# Patient Record
Sex: MALE | Race: WHITE | Employment: OTHER | URBAN - METROPOLITAN AREA
[De-identification: names, ages, dates, MRNs, and addresses within clinical notes are randomized per-mention and may not be internally consistent; named-entity substitution may affect disease eponyms.]

---

## 2017-02-20 ENCOUNTER — TELEPHONE (OUTPATIENT)
Dept: INTERNAL MEDICINE CLINIC | Age: 82
End: 2017-02-20

## 2017-02-23 ENCOUNTER — OFFICE VISIT (OUTPATIENT)
Dept: INTERNAL MEDICINE CLINIC | Age: 82
End: 2017-02-23

## 2017-02-23 VITALS
BODY MASS INDEX: 30.24 KG/M2 | DIASTOLIC BLOOD PRESSURE: 78 MMHG | HEART RATE: 60 BPM | WEIGHT: 216 LBS | SYSTOLIC BLOOD PRESSURE: 136 MMHG | HEIGHT: 71 IN

## 2017-02-23 DIAGNOSIS — R41.3 MEMORY LOSS: Primary | ICD-10-CM

## 2017-02-23 DIAGNOSIS — R73.9 HYPERGLYCEMIA: ICD-10-CM

## 2017-02-23 DIAGNOSIS — D64.9 NORMOCYTIC ANEMIA: ICD-10-CM

## 2017-02-23 LAB
ANION GAP SERPL CALCULATED.3IONS-SCNC: 13 MMOL/L (ref 3–16)
BUN BLDV-MCNC: 14 MG/DL (ref 7–20)
CALCIUM SERPL-MCNC: 9.1 MG/DL (ref 8.3–10.6)
CHLORIDE BLD-SCNC: 102 MMOL/L (ref 99–110)
CO2: 25 MMOL/L (ref 21–32)
CREAT SERPL-MCNC: 0.9 MG/DL (ref 0.8–1.3)
FOLATE: >20 NG/ML (ref 4.78–24.2)
GFR AFRICAN AMERICAN: >60
GFR NON-AFRICAN AMERICAN: >60
GLUCOSE BLD-MCNC: 104 MG/DL (ref 70–99)
POTASSIUM SERPL-SCNC: 4.9 MMOL/L (ref 3.5–5.1)
SODIUM BLD-SCNC: 140 MMOL/L (ref 136–145)
T4 FREE: 1.1 NG/DL (ref 0.9–1.8)
TSH SERPL DL<=0.05 MIU/L-ACNC: 1.85 UIU/ML (ref 0.27–4.2)
VITAMIN B-12: 623 PG/ML (ref 211–911)

## 2017-02-23 PROCEDURE — 99214 OFFICE O/P EST MOD 30 MIN: CPT | Performed by: INTERNAL MEDICINE

## 2017-02-23 RX ORDER — DONEPEZIL HYDROCHLORIDE 5 MG/1
5 TABLET, FILM COATED ORAL NIGHTLY
Qty: 30 TABLET | Refills: 3 | Status: SHIPPED | OUTPATIENT
Start: 2017-02-23 | End: 2017-05-25

## 2017-02-24 LAB
ESTIMATED AVERAGE GLUCOSE: 122.6 MG/DL
HBA1C MFR BLD: 5.9 %

## 2017-05-25 ENCOUNTER — OFFICE VISIT (OUTPATIENT)
Dept: INTERNAL MEDICINE CLINIC | Age: 82
End: 2017-05-25

## 2017-05-25 VITALS
BODY MASS INDEX: 30.24 KG/M2 | HEIGHT: 71 IN | DIASTOLIC BLOOD PRESSURE: 74 MMHG | WEIGHT: 216 LBS | HEART RATE: 60 BPM | SYSTOLIC BLOOD PRESSURE: 124 MMHG

## 2017-05-25 DIAGNOSIS — I10 ESSENTIAL HYPERTENSION: Primary | Chronic | ICD-10-CM

## 2017-05-25 DIAGNOSIS — R41.3 MEMORY LOSS: ICD-10-CM

## 2017-05-25 PROCEDURE — 99213 OFFICE O/P EST LOW 20 MIN: CPT | Performed by: INTERNAL MEDICINE

## 2017-05-31 RX ORDER — PRAVASTATIN SODIUM 20 MG
TABLET ORAL
Qty: 90 TABLET | Refills: 3 | Status: SHIPPED | OUTPATIENT
Start: 2017-05-31 | End: 2018-06-20 | Stop reason: SDUPTHER

## 2017-10-04 DIAGNOSIS — Z23 NEED FOR INFLUENZA VACCINATION: Primary | ICD-10-CM

## 2017-10-04 PROCEDURE — G0008 ADMIN INFLUENZA VIRUS VAC: HCPCS | Performed by: INTERNAL MEDICINE

## 2017-10-04 PROCEDURE — 90662 IIV NO PRSV INCREASED AG IM: CPT | Performed by: INTERNAL MEDICINE

## 2017-11-27 ENCOUNTER — OFFICE VISIT (OUTPATIENT)
Dept: INTERNAL MEDICINE CLINIC | Age: 82
End: 2017-11-27

## 2017-11-27 VITALS
DIASTOLIC BLOOD PRESSURE: 72 MMHG | SYSTOLIC BLOOD PRESSURE: 122 MMHG | BODY MASS INDEX: 29.54 KG/M2 | HEART RATE: 64 BPM | WEIGHT: 211 LBS | HEIGHT: 71 IN

## 2017-11-27 DIAGNOSIS — N40.0 BENIGN PROSTATIC HYPERPLASIA WITHOUT URINARY OBSTRUCTION: ICD-10-CM

## 2017-11-27 DIAGNOSIS — I10 ESSENTIAL HYPERTENSION: Primary | Chronic | ICD-10-CM

## 2017-11-27 DIAGNOSIS — E78.2 MIXED HYPERLIPIDEMIA: ICD-10-CM

## 2017-11-27 LAB
A/G RATIO: 1.6 (ref 1.1–2.2)
ALBUMIN SERPL-MCNC: 4.2 G/DL (ref 3.4–5)
ALP BLD-CCNC: 64 U/L (ref 40–129)
ALT SERPL-CCNC: 18 U/L (ref 10–40)
ANION GAP SERPL CALCULATED.3IONS-SCNC: 16 MMOL/L (ref 3–16)
AST SERPL-CCNC: 14 U/L (ref 15–37)
BILIRUB SERPL-MCNC: 0.6 MG/DL (ref 0–1)
BUN BLDV-MCNC: 17 MG/DL (ref 7–20)
CALCIUM SERPL-MCNC: 9.1 MG/DL (ref 8.3–10.6)
CHLORIDE BLD-SCNC: 105 MMOL/L (ref 99–110)
CHOLESTEROL, TOTAL: 173 MG/DL (ref 0–199)
CO2: 26 MMOL/L (ref 21–32)
CREAT SERPL-MCNC: 0.9 MG/DL (ref 0.8–1.3)
GFR AFRICAN AMERICAN: >60
GFR NON-AFRICAN AMERICAN: >60
GLOBULIN: 2.6 G/DL
GLUCOSE BLD-MCNC: 97 MG/DL (ref 70–99)
HDLC SERPL-MCNC: 63 MG/DL (ref 40–60)
LDL CHOLESTEROL CALCULATED: 97 MG/DL
POTASSIUM SERPL-SCNC: 4.7 MMOL/L (ref 3.5–5.1)
SODIUM BLD-SCNC: 147 MMOL/L (ref 136–145)
T4 FREE: 1.1 NG/DL (ref 0.9–1.8)
TOTAL PROTEIN: 6.8 G/DL (ref 6.4–8.2)
TRIGL SERPL-MCNC: 63 MG/DL (ref 0–150)
TSH SERPL DL<=0.05 MIU/L-ACNC: 1.96 UIU/ML (ref 0.27–4.2)
VLDLC SERPL CALC-MCNC: 13 MG/DL

## 2017-11-27 PROCEDURE — 99214 OFFICE O/P EST MOD 30 MIN: CPT | Performed by: INTERNAL MEDICINE

## 2017-11-27 ASSESSMENT — PATIENT HEALTH QUESTIONNAIRE - PHQ9
1. LITTLE INTEREST OR PLEASURE IN DOING THINGS: 0
SUM OF ALL RESPONSES TO PHQ9 QUESTIONS 1 & 2: 0
2. FEELING DOWN, DEPRESSED OR HOPELESS: 0
SUM OF ALL RESPONSES TO PHQ QUESTIONS 1-9: 0

## 2017-11-27 ASSESSMENT — ENCOUNTER SYMPTOMS
CONSTIPATION: 0
SHORTNESS OF BREATH: 0

## 2017-11-27 NOTE — PROGRESS NOTES
SUBJECTIVE:  Patient ID: Carlos Bowles is a 81 y.o. y.o. male     HPI    Carlos Bowles returns for follow up of hypertension. Patient has been taking his medications as prescribed. Patient's blood pressure is  controlled. Side effects related to taking the medications include no medication side effects noted    Patient returns for follow up of hyperlipidemia. Patient has been taking His medications as prescribed. Patient's lipids are controlled. Side effects related to taking the medications include none. He has not had clinical consequences related to hyperlipidemia including, but not limited to acute coronary syndrome, stroke or chronic kidney disease. Carlos Bowles returns to the office for follow up of osteoarthritis. Pain is located in the bilateral hip(s). He has been taking medication for treatment of symptoms for year(s). Medications for treatment of pain include ibuprofen PRN. Patient reports good relief of symptoms. Pain is not associated with other symptoms. His wife was recently admitted to the hospital for syncope. He is her primary care giver. Review of Systems   Respiratory: Negative for shortness of breath. Cardiovascular: Negative for chest pain. Gastrointestinal: Negative for constipation. Genitourinary: Negative for difficulty urinating. OBJECTIVE:    /72   Pulse 64   Ht 5' 11\" (1.803 m)   Wt 211 lb (95.7 kg)   BMI 29.43 kg/m²    Physical Exam   Constitutional: He is oriented to person, place, and time. He appears well-developed and well-nourished. No distress. HENT:   Head: Normocephalic and atraumatic. Pulmonary/Chest: Effort normal. No respiratory distress. Neurological: He is alert and oriented to person, place, and time. No cranial nerve deficit. Skin: Skin is warm and dry. He is not diaphoretic. Psychiatric: He has a normal mood and affect. His behavior is normal. Judgment and thought content normal.   Vitals reviewed. Current Outpatient Prescriptions:     pravastatin (PRAVACHOL) 20 MG tablet, TAKE ONE TABLET BY MOUTH EVERY NIGHT AT BEDTIME FOR CHOLESTEROL, Disp: 90 tablet, Rfl: 3    lisinopril (PRINIVIL;ZESTRIL) 10 MG tablet, TAKE ONE TABLET BY MOUTH DAILY, Disp: 30 tablet, Rfl: 11    bimatoprost (LUMIGAN) 0.01 % SOLN ophthalmic drops, Place 1 drop into both eyes nightly., Disp: , Rfl:     aspirin 81 MG EC tablet, Take 81 mg by mouth every evening.  , Disp: , Rfl:     multivitamin (ANTIOXIDANT;PROSIGHT) TABS per tablet, Take 1 tablet by mouth every evening.  , Disp: , Rfl:     therapeutic multivitamin-minerals (THERAGRAN-M) tablet, Take 1 tablet by mouth every evening.  , Disp: , Rfl:     Assessment/Plan:  Morgan was seen today for hypertension and hyperlipidemia. Diagnoses and all orders for this visit:    Essential hypertension  -     Comprehensive Metabolic Panel  -     TSH without Reflex  -     T4, Free    Benign prostatic hyperplasia without urinary obstruction  Comments:  Chronic, controlled.     Mixed hyperlipidemia  -     Lipid Panel        Hung Harris MD

## 2018-04-02 ENCOUNTER — OFFICE VISIT (OUTPATIENT)
Dept: INTERNAL MEDICINE CLINIC | Age: 83
End: 2018-04-02

## 2018-04-02 VITALS
HEART RATE: 60 BPM | HEIGHT: 71 IN | BODY MASS INDEX: 27.72 KG/M2 | DIASTOLIC BLOOD PRESSURE: 70 MMHG | SYSTOLIC BLOOD PRESSURE: 122 MMHG | WEIGHT: 198 LBS

## 2018-04-02 DIAGNOSIS — F32.9 REACTIVE DEPRESSION: ICD-10-CM

## 2018-04-02 DIAGNOSIS — E78.2 MIXED HYPERLIPIDEMIA: ICD-10-CM

## 2018-04-02 DIAGNOSIS — I10 ESSENTIAL HYPERTENSION: Primary | Chronic | ICD-10-CM

## 2018-04-02 DIAGNOSIS — N40.0 BENIGN PROSTATIC HYPERPLASIA WITHOUT URINARY OBSTRUCTION: ICD-10-CM

## 2018-04-02 PROCEDURE — 99213 OFFICE O/P EST LOW 20 MIN: CPT | Performed by: INTERNAL MEDICINE

## 2018-07-19 ENCOUNTER — OFFICE VISIT (OUTPATIENT)
Dept: INTERNAL MEDICINE CLINIC | Age: 83
End: 2018-07-19

## 2018-07-19 VITALS
DIASTOLIC BLOOD PRESSURE: 80 MMHG | BODY MASS INDEX: 26.97 KG/M2 | HEART RATE: 60 BPM | SYSTOLIC BLOOD PRESSURE: 130 MMHG | WEIGHT: 193.4 LBS

## 2018-07-19 DIAGNOSIS — F41.1 GENERALIZED ANXIETY DISORDER: Primary | ICD-10-CM

## 2018-07-19 PROCEDURE — 99213 OFFICE O/P EST LOW 20 MIN: CPT | Performed by: INTERNAL MEDICINE

## 2018-07-23 ASSESSMENT — ENCOUNTER SYMPTOMS
SPUTUM PRODUCTION: 0
SHORTNESS OF BREATH: 0
COUGH: 0
HEMOPTYSIS: 0

## 2018-07-25 ENCOUNTER — TELEPHONE (OUTPATIENT)
Dept: RHEUMATOLOGY | Age: 83
End: 2018-07-25

## 2018-07-25 NOTE — TELEPHONE ENCOUNTER
Received a Denial for this medication. States patient has to have a trial and failure to oral NSAIDS. Denial attached. Please advise patient.

## 2018-08-22 ENCOUNTER — TELEPHONE (OUTPATIENT)
Dept: INTERNAL MEDICINE CLINIC | Age: 83
End: 2018-08-22

## 2018-08-22 NOTE — TELEPHONE ENCOUNTER
Pt would like information regarding pancreatitis. He is concerned about his son whom has recently been diagnosed.

## 2018-10-10 DIAGNOSIS — Z23 NEED FOR INFLUENZA VACCINATION: Primary | ICD-10-CM

## 2018-10-10 PROCEDURE — 90662 IIV NO PRSV INCREASED AG IM: CPT | Performed by: INTERNAL MEDICINE

## 2018-10-10 PROCEDURE — G0008 ADMIN INFLUENZA VIRUS VAC: HCPCS | Performed by: INTERNAL MEDICINE

## 2018-11-02 ENCOUNTER — OFFICE VISIT (OUTPATIENT)
Dept: INTERNAL MEDICINE CLINIC | Age: 83
End: 2018-11-02
Payer: COMMERCIAL

## 2018-11-02 VITALS
RESPIRATION RATE: 12 BRPM | SYSTOLIC BLOOD PRESSURE: 136 MMHG | HEART RATE: 68 BPM | TEMPERATURE: 98.1 F | HEIGHT: 71 IN | DIASTOLIC BLOOD PRESSURE: 78 MMHG | WEIGHT: 192.2 LBS | BODY MASS INDEX: 26.91 KG/M2

## 2018-11-02 DIAGNOSIS — J06.9 VIRAL UPPER RESPIRATORY TRACT INFECTION: ICD-10-CM

## 2018-11-02 DIAGNOSIS — I10 ESSENTIAL HYPERTENSION: Primary | Chronic | ICD-10-CM

## 2018-11-02 DIAGNOSIS — E78.2 MIXED HYPERLIPIDEMIA: ICD-10-CM

## 2018-11-02 DIAGNOSIS — R73.03 PRE-DIABETES: ICD-10-CM

## 2018-11-02 DIAGNOSIS — I10 ESSENTIAL HYPERTENSION: Chronic | ICD-10-CM

## 2018-11-02 DIAGNOSIS — N40.0 BENIGN PROSTATIC HYPERPLASIA WITHOUT URINARY OBSTRUCTION: ICD-10-CM

## 2018-11-02 DIAGNOSIS — R41.3 MEMORY IMPAIRMENT: ICD-10-CM

## 2018-11-02 DIAGNOSIS — M17.11 PRIMARY OSTEOARTHRITIS OF RIGHT KNEE: ICD-10-CM

## 2018-11-02 LAB
A/G RATIO: 1.3 (ref 1.1–2.2)
ALBUMIN SERPL-MCNC: 4 G/DL (ref 3.4–5)
ALP BLD-CCNC: 76 U/L (ref 40–129)
ALT SERPL-CCNC: 16 U/L (ref 10–40)
ANION GAP SERPL CALCULATED.3IONS-SCNC: 10 MMOL/L (ref 3–16)
AST SERPL-CCNC: 12 U/L (ref 15–37)
BASOPHILS ABSOLUTE: 0 K/UL (ref 0–0.2)
BASOPHILS RELATIVE PERCENT: 0.3 %
BILIRUB SERPL-MCNC: 0.4 MG/DL (ref 0–1)
BUN BLDV-MCNC: 15 MG/DL (ref 7–20)
CALCIUM SERPL-MCNC: 9.1 MG/DL (ref 8.3–10.6)
CHLORIDE BLD-SCNC: 102 MMOL/L (ref 99–110)
CHOLESTEROL, TOTAL: 164 MG/DL (ref 0–199)
CO2: 28 MMOL/L (ref 21–32)
CREAT SERPL-MCNC: 0.9 MG/DL (ref 0.8–1.3)
EOSINOPHILS ABSOLUTE: 0.1 K/UL (ref 0–0.6)
EOSINOPHILS RELATIVE PERCENT: 1.5 %
GFR AFRICAN AMERICAN: >60
GFR NON-AFRICAN AMERICAN: >60
GLOBULIN: 3.1 G/DL
GLUCOSE BLD-MCNC: 113 MG/DL (ref 70–99)
HCT VFR BLD CALC: 37.6 % (ref 40.5–52.5)
HDLC SERPL-MCNC: 57 MG/DL (ref 40–60)
HEMOGLOBIN: 12.7 G/DL (ref 13.5–17.5)
LDL CHOLESTEROL CALCULATED: 97 MG/DL
LYMPHOCYTES ABSOLUTE: 1.4 K/UL (ref 1–5.1)
LYMPHOCYTES RELATIVE PERCENT: 18.2 %
MCH RBC QN AUTO: 31.2 PG (ref 26–34)
MCHC RBC AUTO-ENTMCNC: 33.8 G/DL (ref 31–36)
MCV RBC AUTO: 92.4 FL (ref 80–100)
MONOCYTES ABSOLUTE: 0.8 K/UL (ref 0–1.3)
MONOCYTES RELATIVE PERCENT: 10 %
NEUTROPHILS ABSOLUTE: 5.5 K/UL (ref 1.7–7.7)
NEUTROPHILS RELATIVE PERCENT: 70 %
PDW BLD-RTO: 12.4 % (ref 12.4–15.4)
PLATELET # BLD: 201 K/UL (ref 135–450)
PMV BLD AUTO: 8.5 FL (ref 5–10.5)
POTASSIUM SERPL-SCNC: 4.4 MMOL/L (ref 3.5–5.1)
RBC # BLD: 4.07 M/UL (ref 4.2–5.9)
SODIUM BLD-SCNC: 140 MMOL/L (ref 136–145)
TOTAL PROTEIN: 7.1 G/DL (ref 6.4–8.2)
TRIGL SERPL-MCNC: 50 MG/DL (ref 0–150)
VLDLC SERPL CALC-MCNC: 10 MG/DL
WBC # BLD: 7.8 K/UL (ref 4–11)

## 2018-11-02 PROCEDURE — 99214 OFFICE O/P EST MOD 30 MIN: CPT | Performed by: INTERNAL MEDICINE

## 2018-11-02 ASSESSMENT — ENCOUNTER SYMPTOMS
RHINORRHEA: 1
CHEST TIGHTNESS: 0
DIARRHEA: 0
TROUBLE SWALLOWING: 0
SINUS PRESSURE: 0
VOICE CHANGE: 0
CONSTIPATION: 0
SHORTNESS OF BREATH: 0

## 2018-11-02 NOTE — ASSESSMENT & PLAN NOTE
Bothers him more from time to time. Has intermittent swelling. Does wear a knee brace when it is bothering him. Has had a prescription for Voltaren gel in the past but is not using currently.

## 2018-11-02 NOTE — PROGRESS NOTES
use: No    Sexual activity: Not on file     Other Topics Concern    Not on file     Social History Narrative    No narrative on file     Family History   Problem Relation Age of Onset    Heart Disease Mother     Sudden Death Father 68        drowning        Outpatient Medications Prior to Visit   Medication Sig Dispense Refill    pravastatin (PRAVACHOL) 20 MG tablet TAKE ONE TABLET BY MOUTH EVERY NIGHT AT BEDTIME FOR CHOLESTEROL 90 tablet 3    lisinopril (PRINIVIL;ZESTRIL) 10 MG tablet TAKE ONE TABLET BY MOUTH DAILY 90 tablet 3    diclofenac sodium (VOLTAREN) 1 % GEL Apply 4 g topically 4 times daily 5 Tube 3    bimatoprost (LUMIGAN) 0.01 % SOLN ophthalmic drops Place 1 drop into both eyes nightly.  aspirin 81 MG EC tablet Take 81 mg by mouth every evening.  multivitamin (ANTIOXIDANT;PROSIGHT) TABS per tablet Take 1 tablet by mouth every evening. No facility-administered medications prior to visit. Patient'spast medical history, surgical history, family history, medications,  and allergies  were all reviewed and updated as appropriate today. Review of Systems   Constitutional: Negative for appetite change, fatigue and fever. HENT: Positive for congestion, postnasal drip, rhinorrhea and sneezing. Negative for ear pain, sinus pressure, trouble swallowing and voice change. Respiratory: Negative for chest tightness and shortness of breath. Cardiovascular: Negative for chest pain. Gastrointestinal: Negative for constipation and diarrhea. Musculoskeletal: Positive for arthralgias. Skin: Negative for rash. /78 (Site: Left Upper Arm)   Pulse 68   Temp 98.1 °F (36.7 °C) (Oral)   Resp 12   Ht 5' 11\" (1.803 m)   Wt 192 lb 3.2 oz (87.2 kg)   BMI 26.81 kg/m²   Physical Exam   Constitutional: He is oriented to person, place, and time. He appears well-developed and well-nourished. He is cooperative. He does not appear ill. No distress.    HENT:   Head:

## 2018-11-03 LAB
ESTIMATED AVERAGE GLUCOSE: 125.5 MG/DL
HBA1C MFR BLD: 6 %

## 2019-01-25 RX ORDER — LISINOPRIL 10 MG/1
TABLET ORAL
Qty: 90 TABLET | Refills: 2 | Status: SHIPPED | OUTPATIENT
Start: 2019-01-25 | End: 2019-11-02 | Stop reason: SDUPTHER

## 2019-06-03 ENCOUNTER — OFFICE VISIT (OUTPATIENT)
Dept: INTERNAL MEDICINE CLINIC | Age: 84
End: 2019-06-03
Payer: MEDICARE

## 2019-06-03 VITALS
BODY MASS INDEX: 27.2 KG/M2 | HEART RATE: 60 BPM | SYSTOLIC BLOOD PRESSURE: 128 MMHG | DIASTOLIC BLOOD PRESSURE: 74 MMHG | WEIGHT: 195 LBS | OXYGEN SATURATION: 98 %

## 2019-06-03 DIAGNOSIS — E78.2 MIXED HYPERLIPIDEMIA: ICD-10-CM

## 2019-06-03 DIAGNOSIS — I10 ESSENTIAL HYPERTENSION: Primary | Chronic | ICD-10-CM

## 2019-06-03 DIAGNOSIS — R73.03 PRE-DIABETES: ICD-10-CM

## 2019-06-03 PROBLEM — J06.9 VIRAL UPPER RESPIRATORY TRACT INFECTION: Status: RESOLVED | Noted: 2018-11-02 | Resolved: 2019-06-03

## 2019-06-03 LAB — HBA1C MFR BLD: 5.2 %

## 2019-06-03 PROCEDURE — G8427 DOCREV CUR MEDS BY ELIG CLIN: HCPCS | Performed by: INTERNAL MEDICINE

## 2019-06-03 PROCEDURE — 4040F PNEUMOC VAC/ADMIN/RCVD: CPT | Performed by: INTERNAL MEDICINE

## 2019-06-03 PROCEDURE — 1123F ACP DISCUSS/DSCN MKR DOCD: CPT | Performed by: INTERNAL MEDICINE

## 2019-06-03 PROCEDURE — 1036F TOBACCO NON-USER: CPT | Performed by: INTERNAL MEDICINE

## 2019-06-03 PROCEDURE — 99214 OFFICE O/P EST MOD 30 MIN: CPT | Performed by: INTERNAL MEDICINE

## 2019-06-03 PROCEDURE — 83036 HEMOGLOBIN GLYCOSYLATED A1C: CPT | Performed by: INTERNAL MEDICINE

## 2019-06-03 PROCEDURE — G8419 CALC BMI OUT NRM PARAM NOF/U: HCPCS | Performed by: INTERNAL MEDICINE

## 2019-06-03 ASSESSMENT — ENCOUNTER SYMPTOMS
SHORTNESS OF BREATH: 0
DIARRHEA: 0
CONSTIPATION: 0
CHEST TIGHTNESS: 0

## 2019-06-03 ASSESSMENT — PATIENT HEALTH QUESTIONNAIRE - PHQ9
1. LITTLE INTEREST OR PLEASURE IN DOING THINGS: 0
2. FEELING DOWN, DEPRESSED OR HOPELESS: 0
SUM OF ALL RESPONSES TO PHQ QUESTIONS 1-9: 0
SUM OF ALL RESPONSES TO PHQ QUESTIONS 1-9: 0
SUM OF ALL RESPONSES TO PHQ9 QUESTIONS 1 & 2: 0

## 2019-06-03 NOTE — PATIENT INSTRUCTIONS
Look for low sodium frozen meals- look at [facility]. You may also try Glucerna or Boost Plus or Ensure Plus to add protein to your diet.

## 2019-06-03 NOTE — PROGRESS NOTES
Patient: Elder Dunn is a 81 y.o. male who presents today with the following Chief Complaint(s):  Chief Complaint   Patient presents with    Check-Up     HTN and feet problems        HPI     Here today for follow up. Is doing ok. Is c/o some tingling in his feet. Has been going on for several years now. Does not bother him. HTN- no complaints. Does not monitor her blood pressure. Remains on on lisinopril 10 mg qd. Pre-DM/DM- last hba1c was 6.0%. Does not eat a good diet. Struggles with meals as he lives alone. Eats mostly cereal. Has a good meal twice a week when he volunteers at [facility]. Is still living alone but has a good relationship with his son. No side effects with Pravachol. No recent labs. Is still mowing his lawn with a riding mower. Plays golf with his son (only 9 holes) weekly. Likes to walk but son likes to take the golf cart. Enjoys singing in the choir at his [employer]. POCT HbA1c 5.2%.      Lab Results   Component Value Date     11/02/2018    K 4.4 11/02/2018     11/02/2018    CO2 28 11/02/2018    BUN 15 11/02/2018    CREATININE 0.9 11/02/2018    GLUCOSE 113 (H) 11/02/2018    CALCIUM 9.1 11/02/2018    PROT 7.1 11/02/2018    LABALBU 4.0 11/02/2018    BILITOT 0.4 11/02/2018    ALKPHOS 76 11/02/2018    AST 12 (L) 11/02/2018    ALT 16 11/02/2018    LABGLOM >60 11/02/2018    GFRAA >60 11/02/2018    AGRATIO 1.3 11/02/2018    GLOB 3.1 11/02/2018       Lab Results   Component Value Date    CHOL 164 11/02/2018    CHOL 173 11/27/2017    CHOL 183 09/28/2016     Lab Results   Component Value Date    TRIG 50 11/02/2018    TRIG 63 11/27/2017    TRIG 61 09/28/2016     Lab Results   Component Value Date    HDL 57 11/02/2018    HDL 63 (H) 11/27/2017    HDL 63 (H) 09/28/2016     Lab Results   Component Value Date    LDLCALC 97 11/02/2018    LDLCALC 97 11/27/2017    LDLCALC 108 (H) 09/28/2016     Lab Results   Component Value Date    LABVLDL 10 11/02/2018    LABVLDL 13 11/27/2017 LABVLDL 12 2016     No results found for: [facility]  Lab Results   Component Value Date    LABA1C 6.0 2018     Lab Results   Component Value Date    .5 2018             No Known Allergies   Past Medical History:   Diagnosis Date    Benign prostatic hypertrophy without urinary obstruction     Bradycardia 1/15/2013    COPD [facility]chronic obstructive pulmonary disease) ([facility]     Glaucoma     Gout     Hyperlipidemia     Hypertension     Hypoxemia     Macular degeneration disease     Osteoarthritis     Sleep apnea     Unspecified sleep apnea       Past Surgical History:   Procedure Laterality Date    COLONOSCOPY      JOINT REPLACEMENT      hip blateral    KNEE SURGERY      TOTAL HIP ARTHROPLASTY  2005    TURP        Social History     Socioeconomic History    Marital status:       Spouse name: Not on file    Number of children: Not on file    Years of education: Not on file    Highest education level: Not on file   Occupational History    Occupation: retired   Social Needs    Financial resource strain: Not on file    Food insecurity:     Worry: Not on file     Inability: Not on file   [employer] needs:     Medical: Not on file     Non-medical: Not on file   Tobacco Use    Smoking status: Former Smoker     Packs/day: 0.50     Last attempt to quit: 1978     Years since quittin.1    Smokeless tobacco: Never Used   Substance and Sexual Activity    Alcohol use: No    Drug use: No    Sexual activity: Not on file   Lifestyle    Physical activity:     Days per week: Not on file     Minutes per session: Not on file    Stress: Not on file   Relationships    Social connections:     Talks on phone: Not on file     Gets together: Not on file     Attends [employer] orth[employer] service: Not on file     Active member of club or organization: Not on file     Attends meetings of clubs or organizations: Not on file     Relationship status: Not on file    Intimate partner violence:     Fear of current or ex partner: Not on file     Emotionally abused: Not on file     Physically abused: Not on file     Forced sexual activity: Not on file   Other Topics Concern    Not on file   Social History Narrative    Not on file     Family History   Problem Relation Age of Onset    Heart Disease Mother     Sudden Death Father 68        drowning        Outpatient Medications Prior to Visit   Medication Sig Dispense Refill    lisinopril (PRINIVIL;ZESTRIL) 10 MG tablet TAKE ONE TABLET BY MOUTH DAILY 90 tablet 2    pravastatin (PRAVACHOL) 20 MG tablet TAKE ONE TABLET BY MOUTH EVERY NIGHT AT BEDTIME FOR CHOLESTEROL 90 tablet 3     No facility-administered medications prior to visit. Patient'spast medical history, surgical history, family history, medications,  and allergies  were all reviewed and updated as appropriate today. Review of Systems   Constitutional: Negative for appetite change, fatigue and fever. Respiratory: Negative for chest tightness and shortness of breath. Cardiovascular: Negative for chest pain. Gastrointestinal: Negative for constipation and diarrhea. Skin: Negative for rash. /74   Pulse 60   Wt 195 lb (88.5 kg)   SpO2 98%   BMI 27.20 kg/m²   Physical Exam   Constitutional: He appears well-developed and well-nourished. He is cooperative. Non-toxic appearance. HENT:   Head: Normocephalic. Right Ear: Tympanic membrane, external ear and ear canal normal.   Left Ear: Tympanic membrane, external ear and ear canal normal.   Nose: Nose normal.   Mouth/Throat: Oropharynx is clear and moist and mucous membranes are normal.   Neck: Carotid bruit is not present. No thyroid mass and no thyromegaly present. Cardiovascular: Normal rate, regular rhythm, normal heart sounds and intact distal pulses. No murmur heard. Pulses:       Dorsalis pedis pulses are 2+ on the right side, and 2+ on the left side.    No LE edema   Pulmonary/Chest: Effort normal and breath sounds normal.   Lymphadenopathy:     He has no cervical adenopathy. Neurological: He is alert. ASSESSMENT/PLAN:    Problem List Items Addressed This Visit     Essential hypertension - Primary (Chronic)     Well controlled on lisinopril 10 mg qd. Normal CMP in the fall. Mixed hyperlipidemia     Well controlled on pravastatin 20 mg qd. No side effects. Excellent lipid panel in November. Pre-diabetes     HbA1c was 6.0 in November. Check POCT HbA1c today. Likely diet related (does eat a lot of cereal). Is going to try to eating frozen meals to increase his protein. Advised to watch the salt content in frozen meals. Relevant Orders    POCT glycosylated hemoglobin (Hb A1C)          Current Outpatient Medications   Medication Sig Dispense Refill    lisinopril (PRINIVIL;ZESTRIL) 10 MG tablet TAKE ONE TABLET BY MOUTH DAILY 90 tablet 2    pravastatin (PRAVACHOL) 20 MG tablet TAKE ONE TABLET BY MOUTH EVERY NIGHT AT BEDTIME FOR CHOLESTEROL 90 tablet 3     No current facility-administered medications for this visit. No follow-ups on file.

## 2019-06-03 NOTE — ASSESSMENT & PLAN NOTE
HbA1c was 6.0 in November. Check POCT HbA1c today. Likely diet related (does eat a lot of cereal). Is going to try to eating frozen meals to increase his protein. Advised to watch the salt content in frozen meals. POCT HbA1c is 5.2% today.

## 2019-06-24 RX ORDER — PRAVASTATIN SODIUM 20 MG
TABLET ORAL
Qty: 90 TABLET | Refills: 2 | Status: SHIPPED | OUTPATIENT
Start: 2019-06-24 | End: 2020-03-25 | Stop reason: SDUPTHER

## 2019-07-09 ENCOUNTER — OFFICE VISIT (OUTPATIENT)
Dept: INTERNAL MEDICINE CLINIC | Age: 84
End: 2019-07-09
Payer: MEDICARE

## 2019-07-09 VITALS
DIASTOLIC BLOOD PRESSURE: 66 MMHG | HEART RATE: 60 BPM | BODY MASS INDEX: 27.16 KG/M2 | HEIGHT: 71 IN | WEIGHT: 194 LBS | SYSTOLIC BLOOD PRESSURE: 116 MMHG

## 2019-07-09 DIAGNOSIS — I10 ESSENTIAL HYPERTENSION: Chronic | ICD-10-CM

## 2019-07-09 DIAGNOSIS — M17.11 PRIMARY OSTEOARTHRITIS OF RIGHT KNEE: Primary | ICD-10-CM

## 2019-07-09 PROCEDURE — G8427 DOCREV CUR MEDS BY ELIG CLIN: HCPCS | Performed by: INTERNAL MEDICINE

## 2019-07-09 PROCEDURE — 1123F ACP DISCUSS/DSCN MKR DOCD: CPT | Performed by: INTERNAL MEDICINE

## 2019-07-09 PROCEDURE — 99213 OFFICE O/P EST LOW 20 MIN: CPT | Performed by: INTERNAL MEDICINE

## 2019-07-09 PROCEDURE — G8419 CALC BMI OUT NRM PARAM NOF/U: HCPCS | Performed by: INTERNAL MEDICINE

## 2019-07-09 PROCEDURE — 4040F PNEUMOC VAC/ADMIN/RCVD: CPT | Performed by: INTERNAL MEDICINE

## 2019-07-09 PROCEDURE — 1036F TOBACCO NON-USER: CPT | Performed by: INTERNAL MEDICINE

## 2019-07-09 ASSESSMENT — ENCOUNTER SYMPTOMS: SHORTNESS OF BREATH: 0

## 2019-07-09 NOTE — ASSESSMENT & PLAN NOTE
Patient states that he did not feel he needed to be seen for this yet I think he called to schedule the appointment. Recommend Tylenol as needed. You can offer referral to orthopedics if it worsens.

## 2019-07-09 NOTE — PROGRESS NOTES
Substance and Sexual Activity    Alcohol use: No    Drug use: No    Sexual activity: Not on file   Lifestyle    Physical activity:     Days per week: Not on file     Minutes per session: Not on file    Stress: Not on file   Relationships    Social connections:     Talks on phone: Not on file     Gets together: Not on file     Attends Roman Catholic service: Not on file     Active member of club or organization: Not on file     Attends meetings of clubs or organizations: Not on file     Relationship status: Not on file    Intimate partner violence:     Fear of current or ex partner: Not on file     Emotionally abused: Not on file     Physically abused: Not on file     Forced sexual activity: Not on file   Other Topics Concern    Not on file   Social History Narrative    Not on file     Family History   Problem Relation Age of Onset    Heart Disease Mother     Sudden Death Father 68        drowning        Outpatient Medications Prior to Visit   Medication Sig Dispense Refill    pravastatin (PRAVACHOL) 20 MG tablet TAKE ONE TABLET BY MOUTH EVERY NIGHT AT BEDTIME FOR CHOLESTEROL 90 tablet 2    lisinopril (PRINIVIL;ZESTRIL) 10 MG tablet TAKE ONE TABLET BY MOUTH DAILY 90 tablet 2     No facility-administered medications prior to visit. Patient'spast medical history, surgical history, family history, medications,  and allergies  were all reviewed and updated as appropriate today. Review of Systems   Constitutional: Negative for fatigue. Respiratory: Negative for shortness of breath. Cardiovascular: Negative for chest pain and leg swelling. Musculoskeletal: Positive for arthralgias (right knee). /66   Pulse 60   Ht 5' 11\" (1.803 m)   Wt 194 lb (88 kg)   BMI 27.06 kg/m²   Physical Exam   Constitutional: He is oriented to person, place, and time. He appears well-developed and well-nourished. He is cooperative. Non-toxic appearance.    HENT:   Right Ear: Tympanic membrane and ear

## 2019-07-11 ENCOUNTER — TELEPHONE (OUTPATIENT)
Dept: INTERNAL MEDICINE CLINIC | Age: 84
End: 2019-07-11

## 2019-07-18 ENCOUNTER — OFFICE VISIT (OUTPATIENT)
Dept: INTERNAL MEDICINE CLINIC | Age: 84
End: 2019-07-18
Payer: MEDICARE

## 2019-07-18 VITALS
DIASTOLIC BLOOD PRESSURE: 76 MMHG | SYSTOLIC BLOOD PRESSURE: 120 MMHG | WEIGHT: 189.4 LBS | HEIGHT: 71 IN | BODY MASS INDEX: 26.52 KG/M2 | HEART RATE: 60 BPM

## 2019-07-18 DIAGNOSIS — G89.29 CHRONIC PAIN OF RIGHT KNEE: Primary | ICD-10-CM

## 2019-07-18 DIAGNOSIS — M25.561 CHRONIC PAIN OF RIGHT KNEE: Primary | ICD-10-CM

## 2019-07-18 PROCEDURE — 1036F TOBACCO NON-USER: CPT | Performed by: NURSE PRACTITIONER

## 2019-07-18 PROCEDURE — G8419 CALC BMI OUT NRM PARAM NOF/U: HCPCS | Performed by: NURSE PRACTITIONER

## 2019-07-18 PROCEDURE — G8427 DOCREV CUR MEDS BY ELIG CLIN: HCPCS | Performed by: NURSE PRACTITIONER

## 2019-07-18 PROCEDURE — 99213 OFFICE O/P EST LOW 20 MIN: CPT | Performed by: NURSE PRACTITIONER

## 2019-07-18 PROCEDURE — 1123F ACP DISCUSS/DSCN MKR DOCD: CPT | Performed by: NURSE PRACTITIONER

## 2019-07-18 PROCEDURE — 4040F PNEUMOC VAC/ADMIN/RCVD: CPT | Performed by: NURSE PRACTITIONER

## 2019-07-18 NOTE — PATIENT INSTRUCTIONS
weight to your ankle (not more than 5 pounds). With weight, you do not have to lift your leg more than 12 inches to get a hamstring workout. Shallow standing knee bends    1. Stand with your hands lightly resting on a counter or chair in front of you. Put your feet shoulder-width apart. 2. Slowly bend your knees so that you squat down like you are going to sit in a chair. Make sure your knees do not go in front of your toes. 3. Lower yourself about 6 inches. Your heels should remain on the floor at all times. 4. Rise slowly to a standing position. Heel raises    1. Stand with your feet a few inches apart, with your hands lightly resting on a counter or chair in front of you. 2. Slowly raise your heels off the floor while keeping your knees straight. 3. Hold for about 6 seconds, then slowly lower your heels to the floor. 4. Do 8 to 12 repetitions several times during the day. Follow-up care is a key part of your treatment and safety. Be sure to make and go to all appointments, and call your doctor if you are having problems. It's also a good idea to know your test results and keep a list of the medicines you take. Where can you learn more? Go to [email or web address] org and sign in to your [email or web address] account. Enter R711 in the [employer] box to learn more about \"Knee: Exercises. \"     If you do not have an account, please click on the \"Sign Up Now\" link. Current as of: September 20, 2018  Content Version: 12.0  © 6814-4250 [employer]wise, Incorporated. Care instructions adapted under license by [facility] [place] ([facility]). If you have questions about a medical condition or this instruction, always ask your healthcare professional. [address] any warranty or liability for your use of this information.

## 2019-07-23 ENCOUNTER — OFFICE VISIT (OUTPATIENT)
Dept: INTERNAL MEDICINE CLINIC | Age: 84
End: 2019-07-23
Payer: MEDICARE

## 2019-07-23 ENCOUNTER — TELEPHONE (OUTPATIENT)
Dept: INTERNAL MEDICINE CLINIC | Age: 84
End: 2019-07-23

## 2019-07-23 VITALS
OXYGEN SATURATION: 97 % | DIASTOLIC BLOOD PRESSURE: 80 MMHG | WEIGHT: 191.6 LBS | BODY MASS INDEX: 26.72 KG/M2 | SYSTOLIC BLOOD PRESSURE: 122 MMHG | HEART RATE: 80 BPM

## 2019-07-23 DIAGNOSIS — R41.3 MEMORY IMPAIRMENT: ICD-10-CM

## 2019-07-23 DIAGNOSIS — N64.4 NIPPLE SORENESS: Primary | ICD-10-CM

## 2019-07-23 PROCEDURE — 1036F TOBACCO NON-USER: CPT | Performed by: INTERNAL MEDICINE

## 2019-07-23 PROCEDURE — G8427 DOCREV CUR MEDS BY ELIG CLIN: HCPCS | Performed by: INTERNAL MEDICINE

## 2019-07-23 PROCEDURE — 1123F ACP DISCUSS/DSCN MKR DOCD: CPT | Performed by: INTERNAL MEDICINE

## 2019-07-23 PROCEDURE — G8419 CALC BMI OUT NRM PARAM NOF/U: HCPCS | Performed by: INTERNAL MEDICINE

## 2019-07-23 PROCEDURE — 99213 OFFICE O/P EST LOW 20 MIN: CPT | Performed by: INTERNAL MEDICINE

## 2019-07-23 PROCEDURE — 4040F PNEUMOC VAC/ADMIN/RCVD: CPT | Performed by: INTERNAL MEDICINE

## 2019-07-23 RX ORDER — DONEPEZIL HYDROCHLORIDE 5 MG/1
5 TABLET, FILM COATED ORAL NIGHTLY
Qty: 30 TABLET | Refills: 0 | Status: SHIPPED | OUTPATIENT
Start: 2019-07-23 | End: 2019-09-18 | Stop reason: SDUPTHER

## 2019-07-23 NOTE — TELEPHONE ENCOUNTER
Called and left message for the pt. When he calls back please offer him the 12 apt.  If it is taken please offer him the 5:00 pm. Thank you

## 2019-07-23 NOTE — PROGRESS NOTES
Patient: Arthur Rizvi is a 81 y.o. male who presents today with the following Chief Complaint(s):  Chief Complaint   Patient presents with    Breast Problem     right nipple sore        HPI     Here today c/o right nipple soreness. Has been sore for \"awhile now\"- may be 6-12 months. No swelling. No mass. Does not look any different that he has noticed. Has noticed that he has been more forgetful (has missed 2 appointments in the last month, forgets other appointments/needing to write down appointments- scheduled a golf game this morning and he cannot remember when he is playing. I have noticed him to be more repetitive in his stories/history lacks details). Denies difficulty with hearing since having ears cleaned out. Denies depression. No Known Allergies   Past Medical History:   Diagnosis Date    Benign prostatic hypertrophy without urinary obstruction     Bradycardia 1/15/2013    COPD (chronic obstructive pulmonary disease) (HCC)     Glaucoma     Gout     Hyperlipidemia     Hypertension     Hypoxemia     Macular degeneration disease     Osteoarthritis     Sleep apnea     Unspecified sleep apnea       Past Surgical History:   Procedure Laterality Date    COLONOSCOPY      JOINT REPLACEMENT      hip blateral    KNEE SURGERY      TOTAL HIP ARTHROPLASTY  2005    TURP        Social History     Socioeconomic History    Marital status:       Spouse name: Not on file    Number of children: Not on file    Years of education: Not on file    Highest education level: Not on file   Occupational History    Occupation: retired   Social Needs    Financial resource strain: Not on file    Food insecurity:     Worry: Not on file     Inability: Not on file   [email or web address] needs:     Medical: Not on file     Non-medical: Not on file   Tobacco Use    Smoking status: Former Smoker     Packs/day: 0.50     Last attempt to quit: 1978     Years since quittin.2    Smokeless tobacco: Never Used   Substance and Sexual Activity    Alcohol use: No    Drug use: No    Sexual activity: Not on file   Lifestyle    Physical activity:     Days per week: Not on file     Minutes per session: Not on file    Stress: Not on file   Relationships    Social connections:     Talks on phone: Not on file     Gets together: Not on file     Attends Jewish service: Not on file     Active member of club or organization: Not on file     Attends meetings of clubs or organizations: Not on file     Relationship status: Not on file    Intimate partner violence:     Fear of current or ex partner: Not on file     Emotionally abused: Not on file     Physically abused: Not on file     Forced sexual activity: Not on file   Other Topics Concern    Not on file   Social History Narrative    Not on file     Family History   Problem Relation Age of Onset    Heart Disease Mother     Sudden Death Father 68        drowning        Outpatient Medications Prior to Visit   Medication Sig Dispense Refill    pravastatin (PRAVACHOL) 20 MG tablet TAKE ONE TABLET BY MOUTH EVERY NIGHT AT BEDTIME FOR CHOLESTEROL 90 tablet 2    lisinopril (PRINIVIL;ZESTRIL) 10 MG tablet TAKE ONE TABLET BY MOUTH DAILY 90 tablet 2     No facility-administered medications prior to visit. Patient'spast medical history, surgical history, family history, medications,  and allergies  were all reviewed and updated as appropriate today. Review of Systems   Constitutional: Negative for fatigue and fever. Neurological: Negative for headaches. Psychiatric/Behavioral: Negative for dysphoric mood and sleep disturbance. /80   Pulse 80   Wt 191 lb 9.6 oz (86.9 kg)   SpO2 97%   BMI 26.72 kg/m²   Physical Exam   Constitutional: He appears well-developed and well-nourished. No distress. Pulmonary/Chest: Right breast exhibits tenderness (nipple).  Right breast exhibits no inverted nipple, no mass, no nipple discharge and no skin

## 2019-07-28 PROBLEM — N64.4 NIPPLE SORENESS: Status: ACTIVE | Noted: 2019-07-28

## 2019-07-28 NOTE — ASSESSMENT & PLAN NOTE
Exam unremarkable. No medications likely contributing to his tenderness. Supportive care with Aquaphor and covering with Bandaids prn.

## 2019-08-30 ENCOUNTER — TELEPHONE (OUTPATIENT)
Dept: INTERNAL MEDICINE CLINIC | Age: 84
End: 2019-08-30

## 2019-08-31 ENCOUNTER — HOSPITAL ENCOUNTER (EMERGENCY)
Age: 84
Discharge: HOME | End: 2019-08-31
Attending: EMERGENCY MEDICINE
Payer: MEDICARE

## 2019-08-31 VITALS
HEART RATE: 66 BPM | RESPIRATION RATE: 12 BRPM | SYSTOLIC BLOOD PRESSURE: 165 MMHG | TEMPERATURE: 98.1 F | BODY MASS INDEX: 26.66 KG/M2 | DIASTOLIC BLOOD PRESSURE: 66 MMHG | OXYGEN SATURATION: 96 % | WEIGHT: 191.13 LBS

## 2019-08-31 DIAGNOSIS — N64.4 NIPPLE PAIN: Primary | ICD-10-CM

## 2019-08-31 DIAGNOSIS — L03.90 CELLULITIS, UNSPECIFIED CELLULITIS SITE: ICD-10-CM

## 2019-08-31 DIAGNOSIS — N64.4 BREAST PAIN, RIGHT: ICD-10-CM

## 2019-08-31 PROCEDURE — 99284 EMERGENCY DEPT VISIT MOD MDM: CPT

## 2019-08-31 RX ORDER — CEPHALEXIN 500 MG/1
500 CAPSULE ORAL 4 TIMES DAILY
Qty: 40 CAPSULE | Refills: 0 | Status: SHIPPED | OUTPATIENT
Start: 2019-08-31 | End: 2019-09-10

## 2019-08-31 ASSESSMENT — ENCOUNTER SYMPTOMS
ABDOMINAL PAIN: 0
BACK PAIN: 0
SHORTNESS OF BREATH: 0
COLOR CHANGE: 0
VOMITING: 0
STRIDOR: 0
NAUSEA: 0
CONSTIPATION: 0
COUGH: 0
WHEEZING: 0
DIARRHEA: 0
ABDOMINAL DISTENTION: 0

## 2019-08-31 ASSESSMENT — PAIN DESCRIPTION - PAIN TYPE: TYPE: ACUTE PAIN

## 2019-08-31 ASSESSMENT — PAIN SCALES - GENERAL: PAINLEVEL_OUTOF10: 3

## 2019-08-31 ASSESSMENT — PAIN DESCRIPTION - LOCATION: LOCATION: BREAST

## 2019-08-31 NOTE — ED PROVIDER NOTES
rash and wound. Neurological: Negative for dizziness, tremors, seizures, syncope, facial asymmetry, speech difficulty, weakness, light-headedness, numbness and headaches. Psychiatric/Behavioral: Negative for confusion. All other systems reviewed and are negative. Positives and Pertinent negatives as per HPI. Except as noted abovein the ROS, all other systems were reviewed and negative. PAST MEDICAL HISTORY     Past Medical History:   Diagnosis Date    Benign prostatic hypertrophy without urinary obstruction     Bradycardia 1/15/2013    COPD (chronic obstructive pulmonary disease) (HCC)     Glaucoma     Gout     Hyperlipidemia     Hypertension     Hypoxemia     Macular degeneration disease     Osteoarthritis     Sleep apnea     Unspecified sleep apnea          SURGICAL HISTORY     Past Surgical History:   Procedure Laterality Date    COLONOSCOPY      JOINT REPLACEMENT      hip blateral    KNEE SURGERY      TOTAL HIP ARTHROPLASTY  03/2005    TURP           CURRENTMEDICATIONS       Previous Medications    DONEPEZIL (ARICEPT) 5 MG TABLET    Take 1 tablet by mouth nightly    LISINOPRIL (PRINIVIL;ZESTRIL) 10 MG TABLET    TAKE ONE TABLET BY MOUTH DAILY    PRAVASTATIN (PRAVACHOL) 20 MG TABLET    TAKE ONE TABLET BY MOUTH EVERY NIGHT AT BEDTIME FOR CHOLESTEROL         ALLERGIES     Patient has no known allergies. FAMILYHISTORY       Family History   Problem Relation Age of Onset    Heart Disease Mother     Sudden Death Father 68        drowning          SOCIAL HISTORY       Social History     Socioeconomic History    Marital status:       Spouse name: None    Number of children: None    Years of education: None    Highest education level: None   Occupational History    Occupation: retired   Social Needs    Financial resource strain: None    Food insecurity:     Worry: None     Inability: None    Transportation needs:     Medical: None     Non-medical: None   Tobacco Use

## 2019-09-03 NOTE — TELEPHONE ENCOUNTER
Dr Tabitha Mcneil had nothing rama today. Did call and leave a message for the pt. When he calls back please offer him a same day an apt with Dr Tabitha Mcneil.

## 2019-09-10 ENCOUNTER — TELEPHONE (OUTPATIENT)
Dept: SURGERY | Age: 84
End: 2019-09-10

## 2019-09-10 NOTE — TELEPHONE ENCOUNTER
Left VM for pt to return call to office if interested in scheduling with our office still. Pt is a workqueue referral for breast pain/possible cellulitis. Pt can be placed on Tiara's schedule.

## 2019-09-19 ENCOUNTER — TELEPHONE (OUTPATIENT)
Dept: INTERNAL MEDICINE CLINIC | Age: 84
End: 2019-09-19

## 2019-09-19 RX ORDER — DONEPEZIL HYDROCHLORIDE 5 MG/1
TABLET, FILM COATED ORAL
Qty: 30 TABLET | Refills: 0 | Status: SHIPPED | OUTPATIENT
Start: 2019-09-19 | End: 2019-09-26 | Stop reason: SDUPTHER

## 2019-09-23 ENCOUNTER — TELEPHONE (OUTPATIENT)
Dept: INTERNAL MEDICINE CLINIC | Age: 84
End: 2019-09-23

## 2019-09-24 ENCOUNTER — APPOINTMENT (OUTPATIENT)
Dept: ULTRASOUND IMAGING | Age: 84
End: 2019-09-24
Payer: MEDICARE

## 2019-09-24 ENCOUNTER — HOSPITAL ENCOUNTER (EMERGENCY)
Age: 84
Discharge: HOME | End: 2019-09-24
Payer: MEDICARE

## 2019-09-24 VITALS
SYSTOLIC BLOOD PRESSURE: 137 MMHG | WEIGHT: 185 LBS | HEART RATE: 63 BPM | BODY MASS INDEX: 25.9 KG/M2 | RESPIRATION RATE: 18 BRPM | TEMPERATURE: 98.3 F | OXYGEN SATURATION: 97 % | HEIGHT: 71 IN | DIASTOLIC BLOOD PRESSURE: 73 MMHG

## 2019-09-24 DIAGNOSIS — N63.0 BREAST MASS IN MALE: Primary | ICD-10-CM

## 2019-09-24 PROCEDURE — 76641 ULTRASOUND BREAST COMPLETE: CPT

## 2019-09-24 PROCEDURE — 99284 EMERGENCY DEPT VISIT MOD MDM: CPT

## 2019-09-24 ASSESSMENT — ENCOUNTER SYMPTOMS
COUGH: 0
RHINORRHEA: 0
DIARRHEA: 0
ABDOMINAL PAIN: 0
WHEEZING: 0
VOMITING: 0
NAUSEA: 0
SHORTNESS OF BREATH: 0

## 2019-09-24 ASSESSMENT — PAIN SCALES - GENERAL: PAINLEVEL_OUTOF10: 3

## 2019-09-24 ASSESSMENT — PAIN DESCRIPTION - LOCATION: LOCATION: BREAST

## 2019-09-24 ASSESSMENT — PAIN DESCRIPTION - PAIN TYPE: TYPE: ACUTE PAIN

## 2019-09-24 NOTE — ED PROVIDER NOTES
[facility]        Pt Name: Malena Case  MRN: [id number]  [place] 1935  Date of evaluation: 9/24/2019  Provider: Cheri Mooney PA-C  PCP: David Augustine DO    This patient was not seen and evaluated by the attending physician No att. providers found. CHIEF COMPLAINT       Chief Complaint   Patient presents with    Breast Pain     pt states he has right breast pain, soreness x2 months. pt concerned for infection       HISTORY OF PRESENT ILLNESS   (Location/Symptom, Timing/Onset, Context/Setting, Quality, Duration, Modifying Factors, Severity)  Note limiting factors. Malena Case is a 81 y.o. male who presents for evaluation of painful lump under his right nipple that is been there for 6 months. Patient has not been seen or evaluated for this. He denies any warmth, erythema or drainage. No fevers or chills. No abdominal pain, nausea or vomiting. He has no other complaints or concerns at this time. Nursing Notes were all reviewed and agreed with or any disagreements were addressed  in the HPI. REVIEW OF SYSTEMS    (2-9 systems for level 4, 10 or more for level 5)     Review of Systems   Constitutional: Negative for appetite change, chills and fever. HENT: Negative for congestion and rhinorrhea. Respiratory: Negative for cough, shortness of breath and wheezing. Cardiovascular: Negative for chest pain (R breast pain). Gastrointestinal: Negative for abdominal pain, diarrhea, nausea and vomiting. Genitourinary: Negative for difficulty urinating, dysuria and hematuria. Musculoskeletal: Negative for neck pain and neck stiffness. Skin: Negative for rash. Neurological: Negative for headaches. Positives and Pertinent negatives as per HPI. Except as noted abovein the ROS, all other systems were reviewed and negative.        PAST MEDICAL HISTORY     Past Medical History:   Diagnosis Date Department Physician in the absence of a cardiologist.  Please see their note for interpretation of EKG. RADIOLOGY:   Non-plain film images such as CT, Ultrasound and MRI are read by the radiologist. Plain radiographic images are visualized andpreliminarily interpreted by the  ED Provider with the below findings:        Interpretation perthe Radiologist below, if available at the time of this note:    US BREAST COMPLETE RIGHT   Final Result   No evidence of abscess or suspicious mass in the area of palpable concern in   the retroareolar right breast.  The area of concern has a sonographic   appearance suggestive of gynecomastia. Clinical follow-up is recommended for   further evaluation and management. BIRADS:   BIRADS - CATEGORY 2      Benign, no evidence of malignancy. Clinical follow-up is recommended. OVERALL ASSESSMENT - BENIGN      The findings and recommendations were discussed with the patient. No results found. PROCEDURES   Unless otherwise noted below, none     Procedures    CRITICAL CARE TIME   N/A    CONSULTS:  None      EMERGENCY DEPARTMENT COURSE and DIFFERENTIAL DIAGNOSIS/MDM:   Vitals:    Vitals:    09/24/19 1501   BP: 137/73   Pulse: 63   Resp: 18   Temp: 98.3 °F (36.8 °C)   TempSrc: Oral   SpO2: 97%   Weight: 185 lb (83.9 kg)   Height: 5' 11\" (1.803 m)       Patient was given thefollowing medications:  Medications - No data to display    Patient presents for evaluation of right breast mass x6 months. On exam, he is well-appearing and in no acute distress. Vitals are stable and he is afebrile. Lungs are clear to auscultation bilaterally. Abdomen is benign. Patient does have oblong tender mobile mass in his right breast to the right and deep to the areola as illustrated above. There is no skin changes, erythema induration warmth or fluctuance. No active drainage or nipple discharge.   Ultrasound of the right breast shows no evidence of abscess or suspicious

## 2019-09-26 ENCOUNTER — IMMUNIZATION (OUTPATIENT)
Dept: INTERNAL MEDICINE CLINIC | Age: 84
End: 2019-09-26
Payer: MEDICARE

## 2019-09-26 ENCOUNTER — OFFICE VISIT (OUTPATIENT)
Dept: INTERNAL MEDICINE CLINIC | Age: 84
End: 2019-09-26
Payer: MEDICARE

## 2019-09-26 VITALS
OXYGEN SATURATION: 98 % | HEART RATE: 54 BPM | WEIGHT: 187.2 LBS | SYSTOLIC BLOOD PRESSURE: 138 MMHG | DIASTOLIC BLOOD PRESSURE: 72 MMHG | BODY MASS INDEX: 26.11 KG/M2

## 2019-09-26 DIAGNOSIS — I10 ESSENTIAL HYPERTENSION: Chronic | ICD-10-CM

## 2019-09-26 DIAGNOSIS — Z00.00 ROUTINE GENERAL MEDICAL EXAMINATION AT A HEALTH CARE FACILITY: Primary | ICD-10-CM

## 2019-09-26 DIAGNOSIS — Z23 NEED FOR INFLUENZA VACCINATION: Primary | ICD-10-CM

## 2019-09-26 DIAGNOSIS — R41.3 MEMORY IMPAIRMENT: ICD-10-CM

## 2019-09-26 PROCEDURE — 90653 IIV ADJUVANT VACCINE IM: CPT | Performed by: INTERNAL MEDICINE

## 2019-09-26 PROCEDURE — G0008 ADMIN INFLUENZA VIRUS VAC: HCPCS | Performed by: INTERNAL MEDICINE

## 2019-09-26 PROCEDURE — 4040F PNEUMOC VAC/ADMIN/RCVD: CPT | Performed by: INTERNAL MEDICINE

## 2019-09-26 PROCEDURE — G0438 PPPS, INITIAL VISIT: HCPCS | Performed by: INTERNAL MEDICINE

## 2019-09-26 PROCEDURE — 1123F ACP DISCUSS/DSCN MKR DOCD: CPT | Performed by: INTERNAL MEDICINE

## 2019-09-26 RX ORDER — DONEPEZIL HYDROCHLORIDE 10 MG/1
10 TABLET, FILM COATED ORAL NIGHTLY
Qty: 90 TABLET | Refills: 3 | Status: SHIPPED | OUTPATIENT
Start: 2019-09-26 | End: 2020-05-29 | Stop reason: SDUPTHER

## 2019-09-26 ASSESSMENT — LIFESTYLE VARIABLES: HOW OFTEN DO YOU HAVE A DRINK CONTAINING ALCOHOL: 0

## 2019-09-26 ASSESSMENT — PATIENT HEALTH QUESTIONNAIRE - PHQ9
SUM OF ALL RESPONSES TO PHQ QUESTIONS 1-9: 0
SUM OF ALL RESPONSES TO PHQ QUESTIONS 1-9: 0

## 2019-09-26 NOTE — PROGRESS NOTES
symmetric, no cranial nerve deficit, gait, coordination and speech normal    Patient's complete Health Risk Assessment and screening values have been reviewed and are found in Flowsheets. The following problems were reviewed today and where indicated follow up appointments were made and/or referrals ordered. Positive Risk Factor Screenings with Interventions:     Safety:  Safety  Do you have working smoke detectors?: Yes  Have all throw rugs been removed or fastened?: Yes  Do you have non-slip mats or surfaces in all bathtubs/showers?: (!) No  Do all of your stairways have a railing or banister?: Yes  Are your doorways, halls and stairs free of clutter?: Yes  Safety Interventions:  · Patient declines any further evaluation/treatment for this issue denies difficulties with falls. Personalized Preventive Plan   Current Health Maintenance Status  Immunization History   Administered Date(s) Administered    Influenza A ([id number]) Vaccine PF IM 12/21/2009    Influenza Vaccine, unspecified formulation 09/28/2016    Influenza Virus Vaccine 10/24/2011, 09/24/2015    Influenza Whole 09/24/2015    Influenza, High Dose (Fluzone 65 yrs and older) 09/28/2016, 10/04/2017, 10/10/2018    Influenza, Triv, inactivated, subunit, adjuvanted, IM (Fluad 65 yrs and older) 09/26/2019    Pneumococcal Conjugate 13-valent ([id number]) 09/23/2015    Pneumococcal Polysaccharide ([id number]) 02/24/2007        Health Maintenance   Topic Date Due    Shingles Vaccine (1 of 2) 02/12/1986    Annual Wellness Visit (AWV)  06/03/2019    DTaP/Tdap/Td vaccine (1 - Tdap) 09/28/2021 (Originally 2/12/1954)    Lipid screen  11/02/2019    Potassium monitoring  11/02/2019    Creatinine monitoring  11/02/2019    Flu vaccine  Completed    Pneumococcal 65+ years Vaccine  Completed     Recommendations for Preventive Services Due: see orders and patient instructions/AVS.  .   Recommended screening schedule for the next 5-10 years is provided to disease risk including:   · Quitting tobacco use, reducing amount smoked, or not starting the habit  · Making healthy food choices  · Being physically active and gradualy increasing activity levels   · Reduce weight and determine a healthy BMI goal  · Monitor blood pressure and treat if higher than 140/90 mmHg  · Maintain blood total cholesterol levels under 5 mmol/l or 190 mg/dl  · Maintain LDL cholesterol levels under 3.0 mmol/l or 115 mg/dl   · Control blood glucose levels  · Consider taking aspirin (75 mg daily), once blood pressure is controlled   Provided a follow up plan. Time spent (minutes): 5 minutes. Continue to be active. Remain on Pravachol and lisinopril.

## 2019-09-26 NOTE — ASSESSMENT & PLAN NOTE
Received his flu vaccine earlier today. Will bring in copy of Living Will and POA. Up to date on vaccines. No need for screening colonoscopy based on age.

## 2019-10-16 ENCOUNTER — OFFICE VISIT (OUTPATIENT)
Dept: INTERNAL MEDICINE CLINIC | Age: 84
End: 2019-10-16
Payer: MEDICARE

## 2019-10-16 VITALS
BODY MASS INDEX: 26.39 KG/M2 | DIASTOLIC BLOOD PRESSURE: 80 MMHG | HEART RATE: 56 BPM | WEIGHT: 189.2 LBS | SYSTOLIC BLOOD PRESSURE: 150 MMHG | OXYGEN SATURATION: 99 %

## 2019-10-16 DIAGNOSIS — R41.3 MEMORY IMPAIRMENT: ICD-10-CM

## 2019-10-16 DIAGNOSIS — L98.9 SKIN LESION ON EXAMINATION: ICD-10-CM

## 2019-10-16 DIAGNOSIS — N64.4 NIPPLE SORENESS: ICD-10-CM

## 2019-10-16 DIAGNOSIS — I10 ESSENTIAL HYPERTENSION: Chronic | ICD-10-CM

## 2019-10-16 DIAGNOSIS — Z12.83 SKIN CANCER SCREENING: Primary | ICD-10-CM

## 2019-10-16 PROCEDURE — 4040F PNEUMOC VAC/ADMIN/RCVD: CPT | Performed by: INTERNAL MEDICINE

## 2019-10-16 PROCEDURE — G8419 CALC BMI OUT NRM PARAM NOF/U: HCPCS | Performed by: INTERNAL MEDICINE

## 2019-10-16 PROCEDURE — 99214 OFFICE O/P EST MOD 30 MIN: CPT | Performed by: INTERNAL MEDICINE

## 2019-10-16 PROCEDURE — 1123F ACP DISCUSS/DSCN MKR DOCD: CPT | Performed by: INTERNAL MEDICINE

## 2019-10-16 PROCEDURE — G8482 FLU IMMUNIZE ORDER/ADMIN: HCPCS | Performed by: INTERNAL MEDICINE

## 2019-10-16 PROCEDURE — G8427 DOCREV CUR MEDS BY ELIG CLIN: HCPCS | Performed by: INTERNAL MEDICINE

## 2019-10-16 PROCEDURE — 1036F TOBACCO NON-USER: CPT | Performed by: INTERNAL MEDICINE

## 2019-10-20 PROBLEM — L98.9 SKIN LESION ON EXAMINATION: Status: ACTIVE | Noted: 2019-10-20

## 2019-10-20 ASSESSMENT — ENCOUNTER SYMPTOMS
CONSTIPATION: 0
SHORTNESS OF BREATH: 0
CHEST TIGHTNESS: 0
DIARRHEA: 0

## 2019-10-26 PROBLEM — Z00.00 ROUTINE GENERAL MEDICAL EXAMINATION AT A HEALTH CARE FACILITY: Status: RESOLVED | Noted: 2019-09-26 | Resolved: 2019-10-26

## 2019-11-04 RX ORDER — LISINOPRIL 10 MG/1
TABLET ORAL
Qty: 90 TABLET | Refills: 1 | Status: SHIPPED | OUTPATIENT
Start: 2019-11-04 | End: 2020-05-06

## 2019-11-11 ENCOUNTER — TELEPHONE (OUTPATIENT)
Dept: INTERNAL MEDICINE CLINIC | Age: 84
End: 2019-11-11

## 2019-11-20 ENCOUNTER — TELEPHONE (OUTPATIENT)
Dept: INTERNAL MEDICINE CLINIC | Age: 84
End: 2019-11-20

## 2019-12-02 ENCOUNTER — OFFICE VISIT (OUTPATIENT)
Dept: INTERNAL MEDICINE CLINIC | Age: 84
End: 2019-12-02
Payer: MEDICARE

## 2019-12-02 VITALS
SYSTOLIC BLOOD PRESSURE: 110 MMHG | DIASTOLIC BLOOD PRESSURE: 56 MMHG | WEIGHT: 185.6 LBS | OXYGEN SATURATION: 98 % | HEART RATE: 60 BPM | BODY MASS INDEX: 25.89 KG/M2

## 2019-12-02 DIAGNOSIS — R73.03 PRE-DIABETES: ICD-10-CM

## 2019-12-02 DIAGNOSIS — R73.03 PRE-DIABETES: Primary | ICD-10-CM

## 2019-12-02 DIAGNOSIS — I10 ESSENTIAL HYPERTENSION: Chronic | ICD-10-CM

## 2019-12-02 DIAGNOSIS — E78.2 MIXED HYPERLIPIDEMIA: ICD-10-CM

## 2019-12-02 DIAGNOSIS — R41.3 MEMORY IMPAIRMENT: ICD-10-CM

## 2019-12-02 LAB
A/G RATIO: 1.4 (ref 1.1–2.2)
ALBUMIN SERPL-MCNC: 3.7 G/DL (ref 3.4–5)
ALP BLD-CCNC: 64 U/L (ref 40–129)
ALT SERPL-CCNC: 18 U/L (ref 10–40)
ANION GAP SERPL CALCULATED.3IONS-SCNC: 13 MMOL/L (ref 3–16)
AST SERPL-CCNC: 10 U/L (ref 15–37)
BASOPHILS ABSOLUTE: 0 K/UL (ref 0–0.2)
BASOPHILS RELATIVE PERCENT: 0.4 %
BILIRUB SERPL-MCNC: 0.3 MG/DL (ref 0–1)
BUN BLDV-MCNC: 16 MG/DL (ref 7–20)
CALCIUM SERPL-MCNC: 8.8 MG/DL (ref 8.3–10.6)
CHLORIDE BLD-SCNC: 105 MMOL/L (ref 99–110)
CHOLESTEROL, TOTAL: 147 MG/DL (ref 0–199)
CO2: 26 MMOL/L (ref 21–32)
CREAT SERPL-MCNC: 0.9 MG/DL (ref 0.8–1.3)
EOSINOPHILS ABSOLUTE: 0.1 K/UL (ref 0–0.6)
EOSINOPHILS RELATIVE PERCENT: 1.3 %
GFR AFRICAN AMERICAN: >60
GFR NON-AFRICAN AMERICAN: >60
GLOBULIN: 2.6 G/DL
GLUCOSE BLD-MCNC: 114 MG/DL (ref 70–99)
HCT VFR BLD CALC: 38.3 % (ref 40.5–52.5)
HDLC SERPL-MCNC: 57 MG/DL (ref 40–60)
HEMOGLOBIN: 13 G/DL (ref 13.5–17.5)
LDL CHOLESTEROL CALCULATED: 71 MG/DL
LYMPHOCYTES ABSOLUTE: 1.1 K/UL (ref 1–5.1)
LYMPHOCYTES RELATIVE PERCENT: 23.9 %
MCH RBC QN AUTO: 31.5 PG (ref 26–34)
MCHC RBC AUTO-ENTMCNC: 34 G/DL (ref 31–36)
MCV RBC AUTO: 92.5 FL (ref 80–100)
MONOCYTES ABSOLUTE: 0.4 K/UL (ref 0–1.3)
MONOCYTES RELATIVE PERCENT: 7.9 %
NEUTROPHILS ABSOLUTE: 3.1 K/UL (ref 1.7–7.7)
NEUTROPHILS RELATIVE PERCENT: 66.5 %
PDW BLD-RTO: 12.6 % (ref 12.4–15.4)
PLATELET # BLD: 187 K/UL (ref 135–450)
PMV BLD AUTO: 8.3 FL (ref 5–10.5)
POTASSIUM SERPL-SCNC: 4.6 MMOL/L (ref 3.5–5.1)
RBC # BLD: 4.14 M/UL (ref 4.2–5.9)
SODIUM BLD-SCNC: 144 MMOL/L (ref 136–145)
TOTAL PROTEIN: 6.3 G/DL (ref 6.4–8.2)
TRIGL SERPL-MCNC: 96 MG/DL (ref 0–150)
VLDLC SERPL CALC-MCNC: 19 MG/DL
WBC # BLD: 4.6 K/UL (ref 4–11)

## 2019-12-02 PROCEDURE — 1036F TOBACCO NON-USER: CPT | Performed by: INTERNAL MEDICINE

## 2019-12-02 PROCEDURE — G8482 FLU IMMUNIZE ORDER/ADMIN: HCPCS | Performed by: INTERNAL MEDICINE

## 2019-12-02 PROCEDURE — G8417 CALC BMI ABV UP PARAM F/U: HCPCS | Performed by: INTERNAL MEDICINE

## 2019-12-02 PROCEDURE — 99214 OFFICE O/P EST MOD 30 MIN: CPT | Performed by: INTERNAL MEDICINE

## 2019-12-02 PROCEDURE — 1123F ACP DISCUSS/DSCN MKR DOCD: CPT | Performed by: INTERNAL MEDICINE

## 2019-12-02 PROCEDURE — 4040F PNEUMOC VAC/ADMIN/RCVD: CPT | Performed by: INTERNAL MEDICINE

## 2019-12-02 PROCEDURE — G8427 DOCREV CUR MEDS BY ELIG CLIN: HCPCS | Performed by: INTERNAL MEDICINE

## 2019-12-02 ASSESSMENT — ENCOUNTER SYMPTOMS
DIARRHEA: 0
CONSTIPATION: 0
SHORTNESS OF BREATH: 0
CHEST TIGHTNESS: 0

## 2019-12-03 LAB
ESTIMATED AVERAGE GLUCOSE: 108.3 MG/DL
HBA1C MFR BLD: 5.4 %

## 2020-01-21 ENCOUNTER — TELEPHONE (OUTPATIENT)
Dept: ADMINISTRATIVE | Age: 85
End: 2020-01-21

## 2020-01-21 NOTE — TELEPHONE ENCOUNTER
Spoke with pt's daughter and she wanted Dr Paula Woodard to know that the pt is considering moving to [place]. His daughter does not think he could mentally or physically move to [place]. They have no family out there. He was out there for 5 days and he has not taken his medication for the whole 5 days he was out there. I did offer the pt daughter to bring pt in this week. She states she could not get him here this week. Daughter does not want the pt to know that she called the office. This is an FYI for the doctor.

## 2020-01-21 NOTE — TELEPHONE ENCOUNTER
Pt's daughter, Larry Lovelace called and would like to speak with a nurse for Dr. Ruthann Chacon. She has a few concerns about her dad.

## 2020-03-25 RX ORDER — PRAVASTATIN SODIUM 20 MG
20 TABLET ORAL DAILY
Qty: 90 TABLET | Refills: 3 | Status: SHIPPED | OUTPATIENT
Start: 2020-03-25 | End: 2020-05-29 | Stop reason: SDUPTHER

## 2020-04-28 ENCOUNTER — TELEPHONE (OUTPATIENT)
Dept: INTERNAL MEDICINE CLINIC | Age: 85
End: 2020-04-28

## 2020-04-28 NOTE — LETTER
[facility]  [address] [id number]  Phone: [phone]  Fax: [phone]    Madison Henderson DO        April 28, 2020    Papo Patel  [address] [place], [place] [id number]      To whom it may concern: This letter is in regards to the above-named patient, Jayme Herndon, date of birth 1935. Mr. Daphene Ormond is currently being treated for dementia. He is not able to adequately handle his finances due to his dementia. If you have any questions or concerns, please don't hesitate to call.     Sincerely,        Madison Henderson DO

## 2020-05-06 RX ORDER — LISINOPRIL 10 MG/1
TABLET ORAL
Qty: 90 TABLET | Refills: 0 | Status: SHIPPED | OUTPATIENT
Start: 2020-05-06 | End: 2020-05-29 | Stop reason: SDUPTHER

## 2020-05-29 ENCOUNTER — VIRTUAL VISIT (OUTPATIENT)
Dept: INTERNAL MEDICINE CLINIC | Age: 85
End: 2020-05-29
Payer: MEDICARE

## 2020-05-29 VITALS — TEMPERATURE: 98.4 F | BODY MASS INDEX: 25.2 KG/M2 | WEIGHT: 180 LBS | HEART RATE: 60 BPM | HEIGHT: 71 IN

## 2020-05-29 PROCEDURE — G8427 DOCREV CUR MEDS BY ELIG CLIN: HCPCS | Performed by: INTERNAL MEDICINE

## 2020-05-29 PROCEDURE — 1123F ACP DISCUSS/DSCN MKR DOCD: CPT | Performed by: INTERNAL MEDICINE

## 2020-05-29 PROCEDURE — 4040F PNEUMOC VAC/ADMIN/RCVD: CPT | Performed by: INTERNAL MEDICINE

## 2020-05-29 PROCEDURE — 99213 OFFICE O/P EST LOW 20 MIN: CPT | Performed by: INTERNAL MEDICINE

## 2020-05-29 RX ORDER — LISINOPRIL 10 MG/1
TABLET ORAL
Qty: 90 TABLET | Refills: 3 | Status: SHIPPED | OUTPATIENT
Start: 2020-05-29 | End: 2021-06-30

## 2020-05-29 RX ORDER — PRAVASTATIN SODIUM 20 MG
20 TABLET ORAL DAILY
Qty: 90 TABLET | Refills: 3 | Status: SHIPPED | OUTPATIENT
Start: 2020-05-29 | End: 2022-02-15 | Stop reason: ALTCHOICE

## 2020-05-29 RX ORDER — BIMATOPROST 0.3 MG/ML
1 SOLUTION/ DROPS OPHTHALMIC
COMMUNITY

## 2020-05-29 RX ORDER — DONEPEZIL HYDROCHLORIDE 10 MG/1
10 TABLET, FILM COATED ORAL NIGHTLY
Qty: 90 TABLET | Refills: 3 | Status: SHIPPED | OUTPATIENT
Start: 2020-05-29 | End: 2022-01-21 | Stop reason: SDUPTHER

## 2020-05-29 SDOH — ECONOMIC STABILITY: INCOME INSECURITY: HOW HARD IS IT FOR YOU TO PAY FOR THE VERY BASICS LIKE FOOD, HOUSING, MEDICAL CARE, AND HEATING?: NOT HARD AT ALL

## 2020-05-29 SDOH — ECONOMIC STABILITY: TRANSPORTATION INSECURITY
IN THE PAST 12 MONTHS, HAS LACK OF TRANSPORTATION KEPT YOU FROM MEETINGS, WORK, OR FROM GETTING THINGS NEEDED FOR DAILY LIVING?: NO

## 2020-05-29 SDOH — ECONOMIC STABILITY: FOOD INSECURITY: WITHIN THE PAST 12 MONTHS, THE FOOD YOU BOUGHT JUST DIDN'T LAST AND YOU DIDN'T HAVE MONEY TO GET MORE.: NEVER TRUE

## 2020-05-29 SDOH — ECONOMIC STABILITY: TRANSPORTATION INSECURITY
IN THE PAST 12 MONTHS, HAS THE LACK OF TRANSPORTATION KEPT YOU FROM MEDICAL APPOINTMENTS OR FROM GETTING MEDICATIONS?: NO

## 2020-05-29 SDOH — ECONOMIC STABILITY: FOOD INSECURITY: WITHIN THE PAST 12 MONTHS, YOU WORRIED THAT YOUR FOOD WOULD RUN OUT BEFORE YOU GOT MONEY TO BUY MORE.: NEVER TRUE

## 2020-05-29 ASSESSMENT — PATIENT HEALTH QUESTIONNAIRE - PHQ9
2. FEELING DOWN, DEPRESSED OR HOPELESS: 0
SUM OF ALL RESPONSES TO PHQ9 QUESTIONS 1 & 2: 0
SUM OF ALL RESPONSES TO PHQ QUESTIONS 1-9: 0
SUM OF ALL RESPONSES TO PHQ QUESTIONS 1-9: 0
1. LITTLE INTEREST OR PLEASURE IN DOING THINGS: 0

## 2020-05-29 NOTE — ASSESSMENT & PLAN NOTE
Add Namenda ER. Titration pack prescribed starting at 7 mg daily for 1 week then increasing to 40 mg daily for 1 week then 21 mg daily for 1 week then 28 mg daily. Advised to take with his other medications. Continue Aricept 10 mg daily. Discussed that anxiety is common with dementia. Patient is adamant that he is not anxious. Consider trying Lexapro 10 mg daily.   We will hold off until August.

## 2021-01-28 ENCOUNTER — IMMUNIZATION (OUTPATIENT)
Dept: PRIMARY CARE CLINIC | Age: 86
End: 2021-01-28
Payer: MEDICARE

## 2021-01-28 PROCEDURE — 0001A COVID-19, PFIZER VACCINE 30MCG/0.3ML DOSE: CPT | Performed by: FAMILY MEDICINE

## 2021-01-28 PROCEDURE — 91300 COVID-19, PFIZER VACCINE 30MCG/0.3ML DOSE: CPT | Performed by: FAMILY MEDICINE

## 2021-02-18 ENCOUNTER — IMMUNIZATION (OUTPATIENT)
Dept: PRIMARY CARE CLINIC | Age: 86
End: 2021-02-18
Payer: MEDICARE

## 2021-02-18 PROCEDURE — 91300 COVID-19, PFIZER VACCINE 30MCG/0.3ML DOSE: CPT | Performed by: FAMILY MEDICINE

## 2021-02-18 PROCEDURE — 0002A COVID-19, PFIZER VACCINE 30MCG/0.3ML DOSE: CPT | Performed by: FAMILY MEDICINE

## 2021-10-01 DIAGNOSIS — I10 ESSENTIAL HYPERTENSION: ICD-10-CM

## 2021-10-01 RX ORDER — LISINOPRIL 10 MG/1
TABLET ORAL
Qty: 90 TABLET | Refills: 0 | OUTPATIENT
Start: 2021-10-01

## 2022-01-20 ENCOUNTER — TELEPHONE (OUTPATIENT)
Dept: INTERNAL MEDICINE CLINIC | Age: 87
End: 2022-01-20

## 2022-01-20 DIAGNOSIS — R73.03 PRE-DIABETES: ICD-10-CM

## 2022-01-20 DIAGNOSIS — E55.9 VITAMIN D INSUFFICIENCY: ICD-10-CM

## 2022-01-20 DIAGNOSIS — E78.2 MIXED HYPERLIPIDEMIA: Primary | ICD-10-CM

## 2022-01-20 DIAGNOSIS — I10 ESSENTIAL HYPERTENSION: ICD-10-CM

## 2022-01-20 RX ORDER — LISINOPRIL 10 MG/1
10 TABLET ORAL DAILY
Qty: 30 TABLET | Refills: 1 | Status: SHIPPED | OUTPATIENT
Start: 2022-01-20 | End: 2022-03-21

## 2022-01-20 NOTE — TELEPHONE ENCOUNTER
It has been almost 2 years since I have seen him. Once he calls and schedules an appointment, I will be happy to refill his lisinopril.

## 2022-01-20 NOTE — TELEPHONE ENCOUNTER
Lisinopril has been sent to pharmacy. I have also ordered fasting labs to be done prior to his appointment if able.    Thanks

## 2022-01-20 NOTE — TELEPHONE ENCOUNTER
Pt daughter Erasmo Fried  calling requesting refill of Lisinopril( 6/30/21) and Donepezil (5/29/20)    Last written   See above  Last OV  5/29/20  Next OV  NONE   Last recommended OV NA    Please send to [facility] [address]

## 2022-01-20 NOTE — TELEPHONE ENCOUNTER
----- Message from Miguel Angel Pederson sent at 1/20/2022  3:13 PM EST -----  Subject: Refill Request    QUESTIONS  Name of Medication? donepezil (ARICEPT) 10 MG tablet  Patient-reported dosage and instructions? 10 mg 1 x daily  How many days do you have left? 0  Preferred Pharmacy? [facility]  Pharmacy phone number (if available)? [phone]  Additional Information for Provider? Patient is out of his Medication.   ---------------------------------------------------------------------------  --------------  CALL BACK INFO  What is the best way for the office to contact you? OK to leave message on   voicemail  Preferred Call Back Phone Number?  [phone]

## 2022-01-21 ENCOUNTER — TELEPHONE (OUTPATIENT)
Dept: INTERNAL MEDICINE CLINIC | Age: 87
End: 2022-01-21

## 2022-01-21 DIAGNOSIS — R41.3 MEMORY IMPAIRMENT: ICD-10-CM

## 2022-01-21 RX ORDER — DONEPEZIL HYDROCHLORIDE 10 MG/1
10 TABLET, FILM COATED ORAL NIGHTLY
Qty: 90 TABLET | Refills: 3 | Status: SHIPPED | OUTPATIENT
Start: 2022-01-21

## 2022-01-21 NOTE — TELEPHONE ENCOUNTER
----- Message from Ronaldo Watson sent at 1/20/2022  5:37 PM EST -----  Subject: Refill Request    QUESTIONS  Name of Medication? donepezil (ARICEPT) 10 MG tablet  Patient-reported dosage and instructions? 1 time daily  How many days do you have left? 0  Preferred Pharmacy? [facility]  Pharmacy phone number (if available)? [phone]  Additional Information for Provider? Pt's daughter called, very upset that   we had not refilled her father's Donepezil, and that is was not   communicated to her whether or not the office would not refill it.  ---------------------------------------------------------------------------  --------------  [address]  What is the best way for the office to contact you? OK to leave message on   voicemail  Preferred Call Back Phone Number?  [phone]

## 2022-01-21 NOTE — TELEPHONE ENCOUNTER
Pt daughter Hodan Ochao is calling asking for Donepezil to also be ordered---she did make him an appt for 2/15 and you did fill the Lisinopril---and told her that was all you were filling until he came in but she is determined to get this medication and is asking for you to call her at [phone]. Thanks.

## 2022-02-15 ENCOUNTER — OFFICE VISIT (OUTPATIENT)
Dept: INTERNAL MEDICINE CLINIC | Age: 87
End: 2022-02-15
Payer: MEDICARE

## 2022-02-15 VITALS
SYSTOLIC BLOOD PRESSURE: 138 MMHG | DIASTOLIC BLOOD PRESSURE: 64 MMHG | HEART RATE: 60 BPM | BODY MASS INDEX: 25.75 KG/M2 | WEIGHT: 184.6 LBS

## 2022-02-15 DIAGNOSIS — R73.03 PRE-DIABETES: ICD-10-CM

## 2022-02-15 DIAGNOSIS — E55.9 VITAMIN D INSUFFICIENCY: ICD-10-CM

## 2022-02-15 DIAGNOSIS — G30.1 LATE ONSET ALZHEIMER'S DEMENTIA WITHOUT BEHAVIORAL DISTURBANCE (HCC): ICD-10-CM

## 2022-02-15 DIAGNOSIS — F02.80 LATE ONSET ALZHEIMER'S DEMENTIA WITHOUT BEHAVIORAL DISTURBANCE (HCC): ICD-10-CM

## 2022-02-15 DIAGNOSIS — E78.2 MIXED HYPERLIPIDEMIA: ICD-10-CM

## 2022-02-15 DIAGNOSIS — I10 ESSENTIAL HYPERTENSION: ICD-10-CM

## 2022-02-15 DIAGNOSIS — I10 ESSENTIAL HYPERTENSION: Primary | ICD-10-CM

## 2022-02-15 LAB
BASOPHILS ABSOLUTE: 0 K/UL (ref 0–0.2)
BASOPHILS RELATIVE PERCENT: 0.4 %
EOSINOPHILS ABSOLUTE: 0.1 K/UL (ref 0–0.6)
EOSINOPHILS RELATIVE PERCENT: 1.6 %
HCT VFR BLD CALC: 37.4 % (ref 40.5–52.5)
HEMOGLOBIN: 12.3 G/DL (ref 13.5–17.5)
LYMPHOCYTES ABSOLUTE: 1.6 K/UL (ref 1–5.1)
LYMPHOCYTES RELATIVE PERCENT: 34.1 %
MCH RBC QN AUTO: 30 PG (ref 26–34)
MCHC RBC AUTO-ENTMCNC: 32.9 G/DL (ref 31–36)
MCV RBC AUTO: 91.1 FL (ref 80–100)
MONOCYTES ABSOLUTE: 0.4 K/UL (ref 0–1.3)
MONOCYTES RELATIVE PERCENT: 9.4 %
NEUTROPHILS ABSOLUTE: 2.6 K/UL (ref 1.7–7.7)
NEUTROPHILS RELATIVE PERCENT: 54.5 %
PDW BLD-RTO: 12.8 % (ref 12.4–15.4)
PLATELET # BLD: 167 K/UL (ref 135–450)
PMV BLD AUTO: 8.3 FL (ref 5–10.5)
RBC # BLD: 4.11 M/UL (ref 4.2–5.9)
WBC # BLD: 4.7 K/UL (ref 4–11)

## 2022-02-15 PROCEDURE — 99214 OFFICE O/P EST MOD 30 MIN: CPT | Performed by: INTERNAL MEDICINE

## 2022-02-15 PROCEDURE — 3288F FALL RISK ASSESSMENT DOCD: CPT | Performed by: INTERNAL MEDICINE

## 2022-02-15 PROCEDURE — 1036F TOBACCO NON-USER: CPT | Performed by: INTERNAL MEDICINE

## 2022-02-15 PROCEDURE — G8484 FLU IMMUNIZE NO ADMIN: HCPCS | Performed by: INTERNAL MEDICINE

## 2022-02-15 PROCEDURE — G8427 DOCREV CUR MEDS BY ELIG CLIN: HCPCS | Performed by: INTERNAL MEDICINE

## 2022-02-15 PROCEDURE — G8417 CALC BMI ABV UP PARAM F/U: HCPCS | Performed by: INTERNAL MEDICINE

## 2022-02-15 PROCEDURE — 1123F ACP DISCUSS/DSCN MKR DOCD: CPT | Performed by: INTERNAL MEDICINE

## 2022-02-15 PROCEDURE — 4040F PNEUMOC VAC/ADMIN/RCVD: CPT | Performed by: INTERNAL MEDICINE

## 2022-02-15 ASSESSMENT — PATIENT HEALTH QUESTIONNAIRE - PHQ9
1. LITTLE INTEREST OR PLEASURE IN DOING THINGS: 0
SUM OF ALL RESPONSES TO PHQ QUESTIONS 1-9: 0
SUM OF ALL RESPONSES TO PHQ9 QUESTIONS 1 & 2: 0
2. FEELING DOWN, DEPRESSED OR HOPELESS: 0

## 2022-02-15 ASSESSMENT — ENCOUNTER SYMPTOMS
DIARRHEA: 0
CHEST TIGHTNESS: 0
CONSTIPATION: 0
SHORTNESS OF BREATH: 0

## 2022-02-15 NOTE — ASSESSMENT & PLAN NOTE
No longer taking pravastatin for unclear reasons. Check lipid panel, CMP. Resume pravastatin if needed.

## 2022-02-15 NOTE — ASSESSMENT & PLAN NOTE
[facility] 19/30 2/15/22. Continue Aricept 10 mg daily. We had tried to start him on Namenda in 2019 but was unable to take due to twice daily dosing. Patient's daughter and son are active in his care and check over his finances. Discussed driving safety. Patient's daughter feels that his driving is safe at this time. Driving is restricted locally to [employer] and [employer]. Patient has had no issues with accidents or near misses.

## 2022-02-15 NOTE — PROGRESS NOTES
Patient: Waqas Carlson is a 81 y.o. male who presents today with the following Chief Complaint(s):  Chief Complaint   Patient presents with    Check-Up       HPI     Here today for follow up. He is accompanied by his daughter. Is no longer traveling to [facility]. Is going to [employer] several days per week. Dementia- Is \"functional but forgetful\" per his daughter. Is still able to pay bills (children look over his checkbook and watch for errors). Is able to do all of his own shopping, cooking, and cleaning (states that he eats out more often than not). Is still driving but is \"mostly local\". Daughter does not have any concerns regarding his driving. On Aricept. Only medications he is taking are Aricept and lisinopril. HTN- no issues with blood pressure. Remains on lisinopril. HLD- no longer taking pravastatin. Not sure why. No Known Allergies   Past Medical History:   Diagnosis Date    Benign prostatic hypertrophy without urinary obstruction     Bradycardia 1/15/2013    [facility])     Glaucoma     Gout     Hyperlipidemia     Hypertension     Hypoxemia     Macular degeneration disease     Osteoarthritis     Sleep apnea     Unspecified sleep apnea       Past Surgical History:   Procedure Laterality Date    COLONOSCOPY      JOINT REPLACEMENT      hip blateral    KNEE SURGERY      TOTAL HIP ARTHROPLASTY  2005    TURP        Social History     Socioeconomic History    Marital status:       Spouse name: Not on file    Number of children: Not on file    Years of education: Not on file    Highest education level: Not on file   Occupational History    Occupation: retired   Tobacco Use    Smoking status: Former Smoker     Packs/day: 0.50     Quit date: 1978     Years since quittin.8    Smokeless tobacco: Never Used   Substance and Sexual Activity    Alcohol use: No    Drug use: No    Sexual activity: Not on file   Other Topics Concern    Not on file   Social History Narrative    Not on file     Social Determinants of Health     Financial Resource Strain:     Difficulty of Paying Living Expenses: Not on file   Food Insecurity:     Worried About Running Out of Food in the Last Year: Not on file    Hero of Food in the Last Year: Not on file   Transportation Needs:     Lack of Transportation (Medical): Not on file    Lack of Transportation (Non-Medical):  Not on file   Physical Activity:     Days of Exercise per Week: Not on file    Minutes of Exercise per Session: Not on file   Stress:     Feeling of Stress : Not on file   Social Connections:     Frequency of Communication with Friends and Family: Not on file    Frequency of Social Gatherings with Friends and Family: Not on file    Attends [employer] Services: Not on file    Active Member of [address] [place] [address] [employer] or Organizations: Not on file    Attends Club or Organization Meetings: Not on file    Marital Status: Not on file   Intimate Partner Violence:     Fear of Current or Ex-Partner: Not on file    Emotionally Abused: Not on file    Physically Abused: Not on file    Sexually Abused: Not on file   Housing Stability:     Unable to Pay for Housing in the Last Year: Not on file    Number of [place] in the Last Year: Not on file    Unstable Housing in the Last Year: Not on file     Family History   Problem Relation Age of Onset    Heart Disease Mother     Sudden Death Father 68        drowning        Outpatient Medications Prior to Visit   Medication Sig Dispense Refill    donepezil (ARICEPT) 10 MG tablet Take 1 tablet by mouth nightly 90 tablet 3    lisinopril (PRINIVIL;ZESTRIL) 10 MG tablet Take 1 tablet by mouth daily 30 tablet 1    bimatoprost (LUMIGAN) 0.03 % ophthalmic drops 1 drop      pravastatin (PRAVACHOL) 20 MG tablet Take 1 tablet by mouth daily (Patient not taking: Reported on 2/15/2022) 90 tablet 3    Memantine HCl ER 7 & 14 & 21 &28 MG CP24 7 mg qd x 1 week -> 14 mg qd x 1 week -> 21 mg qd x 1 week -> 28 mg qd (Patient not taking: Reported on 2/15/2022) 28 capsule 0     No facility-administered medications prior to visit. Patient'spast medical history, surgical history, family history, medications,  and allergies  were all reviewed and updated as appropriate today. Review of Systems   Constitutional: Negative for appetite change, fatigue and fever. Respiratory: Negative for chest tightness and shortness of breath. Cardiovascular: Negative for chest pain. Gastrointestinal: Negative for constipation and diarrhea. Skin: Negative for rash. /64   Pulse 60   Wt 184 lb 9.6 oz (83.7 kg)   BMI 25.75 kg/m²   Physical Exam  Vitals and nursing note reviewed. Constitutional:       Appearance: He is well-developed. He is not toxic-appearing. HENT:      Head: Normocephalic. Right Ear: Tympanic membrane, ear canal and external ear normal.      Left Ear: Tympanic membrane, ear canal and external ear normal.      Mouth/Throat:      Pharynx: No oropharyngeal exudate or posterior oropharyngeal erythema. Eyes:      General: No scleral icterus. Extraocular Movements: Extraocular movements intact. Conjunctiva/sclera: Conjunctivae normal.      Pupils: Pupils are equal, round, and reactive to light. Neck:      Thyroid: No thyroid mass or thyromegaly. Vascular: No carotid bruit. Cardiovascular:      Rate and Rhythm: Normal rate and regular rhythm. Heart sounds: Normal heart sounds. No murmur heard. Pulmonary:      Effort: Pulmonary effort is normal.      Breath sounds: Normal breath sounds. Musculoskeletal:      Right lower leg: No edema. Left lower leg: No edema. Lymphadenopathy:      Cervical: No cervical adenopathy. Neurological:      General: No focal deficit present. Mental Status: He is alert and oriented to person, place, and time.    Psychiatric:         Mood and Affect: Mood normal. Behavior: Behavior normal. Behavior is cooperative. Comments: [facility] 19/30 2/15/22         ASSESSMENT/PLAN:    Problem List Items Addressed This Visit     Essential hypertension - Primary (Chronic)      Continue lisinopril 10 mg daily. Check CMP, CBC. Relevant Orders    CBC Auto Differential    Comprehensive Metabolic Panel    Late onset Alzheimer's dementia without behavioral disturbance ([facility] [address]      [facility] 19/30 2/15/22. Continue Aricept 10 mg daily. We had tried to start him on Namenda in 2019 but was unable to take due to twice daily dosing. Patient's daughter and son are active in his care and check over his finances. Discussed driving safety. Patient's daughter feels that his driving is safe at this time. Driving is restricted locally to [employer] [place] [employer]. Patient has had no issues with accidents or near misses. Relevant Orders    Vitamin B12 & Folate    Mixed hyperlipidemia      No longer taking pravastatin for unclear reasons. Check lipid panel, CMP. Resume pravastatin if needed. Relevant Orders    Lipid Panel    TSH with Reflex    Pre-diabetes      Check hemoglobin A1c. Relevant Orders    Hemoglobin A1C    Vitamin D insufficiency      Check vitamin D level. Relevant Orders    Vitamin D 25 Hydroxy          Current Outpatient Medications   Medication Sig Dispense Refill    donepezil (ARICEPT) 10 MG tablet Take 1 tablet by mouth nightly 90 tablet 3    lisinopril (PRINIVIL;ZESTRIL) 10 MG tablet Take 1 tablet by mouth daily 30 tablet 1    bimatoprost (LUMIGAN) 0.03 % ophthalmic drops 1 drop       No current facility-administered medications for this visit. Return in about 6 months (around 8/15/2022).

## 2022-02-16 LAB
A/G RATIO: 1.5 (ref 1.1–2.2)
ALBUMIN SERPL-MCNC: 3.6 G/DL (ref 3.4–5)
ALP BLD-CCNC: 90 U/L (ref 40–129)
ALT SERPL-CCNC: 8 U/L (ref 10–40)
ANION GAP SERPL CALCULATED.3IONS-SCNC: 13 MMOL/L (ref 3–16)
AST SERPL-CCNC: 7 U/L (ref 15–37)
BILIRUB SERPL-MCNC: 0.3 MG/DL (ref 0–1)
BUN BLDV-MCNC: 12 MG/DL (ref 7–20)
CALCIUM SERPL-MCNC: 8.4 MG/DL (ref 8.3–10.6)
CHLORIDE BLD-SCNC: 108 MMOL/L (ref 99–110)
CHOLESTEROL, TOTAL: 173 MG/DL (ref 0–199)
CO2: 25 MMOL/L (ref 21–32)
CREAT SERPL-MCNC: 0.8 MG/DL (ref 0.8–1.3)
ESTIMATED AVERAGE GLUCOSE: 111.2 MG/DL
FOLATE: 16.47 NG/ML (ref 4.78–24.2)
GFR AFRICAN AMERICAN: >60
GFR NON-AFRICAN AMERICAN: >60
GLUCOSE BLD-MCNC: 99 MG/DL (ref 70–99)
HBA1C MFR BLD: 5.5 %
HDLC SERPL-MCNC: 59 MG/DL (ref 40–60)
LDL CHOLESTEROL CALCULATED: 98 MG/DL
POTASSIUM SERPL-SCNC: 4 MMOL/L (ref 3.5–5.1)
SODIUM BLD-SCNC: 146 MMOL/L (ref 136–145)
TOTAL PROTEIN: 6 G/DL (ref 6.4–8.2)
TRIGL SERPL-MCNC: 82 MG/DL (ref 0–150)
TSH REFLEX: 2.09 UIU/ML (ref 0.27–4.2)
VITAMIN B-12: 238 PG/ML (ref 211–911)
VITAMIN D 25-HYDROXY: 24.5 NG/ML
VLDLC SERPL CALC-MCNC: 16 MG/DL

## 2022-03-21 DIAGNOSIS — I10 ESSENTIAL HYPERTENSION: ICD-10-CM

## 2022-03-21 RX ORDER — LISINOPRIL 10 MG/1
TABLET ORAL
Qty: 30 TABLET | Refills: 1 | Status: SHIPPED | OUTPATIENT
Start: 2022-03-21 | End: 2022-05-25

## 2022-05-25 DIAGNOSIS — I10 ESSENTIAL HYPERTENSION: ICD-10-CM

## 2022-05-25 RX ORDER — LISINOPRIL 10 MG/1
TABLET ORAL
Qty: 30 TABLET | Refills: 1 | Status: SHIPPED | OUTPATIENT
Start: 2022-05-25

## 2023-01-11 ENCOUNTER — APPOINTMENT (OUTPATIENT)
Dept: GENERAL RADIOLOGY | Age: 88
DRG: 536 | End: 2023-01-11
Payer: MEDICARE

## 2023-01-11 ENCOUNTER — APPOINTMENT (OUTPATIENT)
Dept: CT IMAGING | Age: 88
DRG: 536 | End: 2023-01-11
Payer: MEDICARE

## 2023-01-11 ENCOUNTER — HOSPITAL ENCOUNTER (OUTPATIENT)
Age: 88
Setting detail: OBSERVATION
LOS: 1 days | Discharge: NURSING, REHAB OR LONG-TERM CARE | DRG: 536 | End: 2023-01-12
Attending: INTERNAL MEDICINE | Admitting: INTERNAL MEDICINE
Payer: MEDICARE

## 2023-01-11 DIAGNOSIS — S72.115A CLOSED NONDISPLACED FRACTURE OF GREATER TROCHANTER OF LEFT FEMUR, INITIAL ENCOUNTER (HCC): Primary | ICD-10-CM

## 2023-01-11 LAB
ANION GAP SERPL CALCULATED.3IONS-SCNC: 8 MMOL/L (ref 3–16)
BASOPHILS ABSOLUTE: 0 K/UL (ref 0–0.2)
BASOPHILS RELATIVE PERCENT: 0.4 %
BUN BLDV-MCNC: 16 MG/DL (ref 7–20)
CALCIUM SERPL-MCNC: 8.7 MG/DL (ref 8.3–10.6)
CHLORIDE BLD-SCNC: 106 MMOL/L (ref 99–110)
CO2: 25 MMOL/L (ref 21–32)
CREAT SERPL-MCNC: 0.8 MG/DL (ref 0.8–1.3)
EOSINOPHILS ABSOLUTE: 0 K/UL (ref 0–0.6)
EOSINOPHILS RELATIVE PERCENT: 0.7 %
GFR SERPL CREATININE-BSD FRML MDRD: >60 ML/MIN/{1.73_M2}
GLUCOSE BLD-MCNC: 146 MG/DL (ref 70–99)
HCT VFR BLD CALC: 40.2 % (ref 40.5–52.5)
HEMOGLOBIN: 13 G/DL (ref 13.5–17.5)
LYMPHOCYTES ABSOLUTE: 1.1 K/UL (ref 1–5.1)
LYMPHOCYTES RELATIVE PERCENT: 15.8 %
MCH RBC QN AUTO: 29.6 PG (ref 26–34)
MCHC RBC AUTO-ENTMCNC: 32.3 G/DL (ref 31–36)
MCV RBC AUTO: 91.6 FL (ref 80–100)
MONOCYTES ABSOLUTE: 0.5 K/UL (ref 0–1.3)
MONOCYTES RELATIVE PERCENT: 6.9 %
NEUTROPHILS ABSOLUTE: 5.2 K/UL (ref 1.7–7.7)
NEUTROPHILS RELATIVE PERCENT: 76.2 %
PDW BLD-RTO: 12.8 % (ref 12.4–15.4)
PLATELET # BLD: 224 K/UL (ref 135–450)
PMV BLD AUTO: 7.7 FL (ref 5–10.5)
POTASSIUM SERPL-SCNC: 4.3 MMOL/L (ref 3.5–5.1)
RBC # BLD: 4.39 M/UL (ref 4.2–5.9)
SODIUM BLD-SCNC: 139 MMOL/L (ref 136–145)
WBC # BLD: 6.8 K/UL (ref 4–11)

## 2023-01-11 PROCEDURE — 6360000002 HC RX W HCPCS: Performed by: INTERNAL MEDICINE

## 2023-01-11 PROCEDURE — 80048 BASIC METABOLIC PNL TOTAL CA: CPT

## 2023-01-11 PROCEDURE — 2580000003 HC RX 258: Performed by: INTERNAL MEDICINE

## 2023-01-11 PROCEDURE — 96372 THER/PROPH/DIAG INJ SC/IM: CPT

## 2023-01-11 PROCEDURE — 96375 TX/PRO/DX INJ NEW DRUG ADDON: CPT

## 2023-01-11 PROCEDURE — 6370000000 HC RX 637 (ALT 250 FOR IP): Performed by: INTERNAL MEDICINE

## 2023-01-11 PROCEDURE — 96376 TX/PRO/DX INJ SAME DRUG ADON: CPT

## 2023-01-11 PROCEDURE — 99285 EMERGENCY DEPT VISIT HI MDM: CPT

## 2023-01-11 PROCEDURE — 85025 COMPLETE CBC W/AUTO DIFF WBC: CPT

## 2023-01-11 PROCEDURE — 73502 X-RAY EXAM HIP UNI 2-3 VIEWS: CPT

## 2023-01-11 PROCEDURE — 1200000000 HC SEMI PRIVATE

## 2023-01-11 PROCEDURE — 6370000000 HC RX 637 (ALT 250 FOR IP): Performed by: PHYSICIAN ASSISTANT

## 2023-01-11 PROCEDURE — 6360000002 HC RX W HCPCS: Performed by: PHYSICIAN ASSISTANT

## 2023-01-11 PROCEDURE — 96374 THER/PROPH/DIAG INJ IV PUSH: CPT

## 2023-01-11 PROCEDURE — 99221 1ST HOSP IP/OBS SF/LOW 40: CPT | Performed by: NURSE PRACTITIONER

## 2023-01-11 RX ORDER — ACETAMINOPHEN 325 MG/1
650 TABLET ORAL EVERY 4 HOURS PRN
Status: DISCONTINUED | OUTPATIENT
Start: 2023-01-11 | End: 2023-01-12 | Stop reason: HOSPADM

## 2023-01-11 RX ORDER — LATANOPROST 50 UG/ML
1 SOLUTION/ DROPS OPHTHALMIC DAILY
Status: CANCELLED | OUTPATIENT
Start: 2023-01-11

## 2023-01-11 RX ORDER — HYDROMORPHONE HYDROCHLORIDE 1 MG/ML
0.5 INJECTION, SOLUTION INTRAMUSCULAR; INTRAVENOUS; SUBCUTANEOUS EVERY 4 HOURS PRN
Status: DISCONTINUED | OUTPATIENT
Start: 2023-01-11 | End: 2023-01-12 | Stop reason: HOSPADM

## 2023-01-11 RX ORDER — ONDANSETRON 2 MG/ML
4 INJECTION INTRAMUSCULAR; INTRAVENOUS ONCE
Status: COMPLETED | OUTPATIENT
Start: 2023-01-11 | End: 2023-01-11

## 2023-01-11 RX ORDER — MORPHINE SULFATE 4 MG/ML
4 INJECTION, SOLUTION INTRAMUSCULAR; INTRAVENOUS ONCE
Status: COMPLETED | OUTPATIENT
Start: 2023-01-11 | End: 2023-01-11

## 2023-01-11 RX ORDER — OXYCODONE HYDROCHLORIDE AND ACETAMINOPHEN 5; 325 MG/1; MG/1
1 TABLET ORAL ONCE
Status: COMPLETED | OUTPATIENT
Start: 2023-01-11 | End: 2023-01-11

## 2023-01-11 RX ORDER — DONEPEZIL HYDROCHLORIDE 5 MG/1
10 TABLET, FILM COATED ORAL NIGHTLY
Status: CANCELLED | OUTPATIENT
Start: 2023-01-11

## 2023-01-11 RX ORDER — ONDANSETRON 2 MG/ML
4 INJECTION INTRAMUSCULAR; INTRAVENOUS EVERY 6 HOURS PRN
Status: DISCONTINUED | OUTPATIENT
Start: 2023-01-11 | End: 2023-01-12 | Stop reason: HOSPADM

## 2023-01-11 RX ORDER — DEXTROSE AND SODIUM CHLORIDE 5; .45 G/100ML; G/100ML
INJECTION, SOLUTION INTRAVENOUS CONTINUOUS
Status: DISCONTINUED | OUTPATIENT
Start: 2023-01-11 | End: 2023-01-12 | Stop reason: HOSPADM

## 2023-01-11 RX ORDER — ENOXAPARIN SODIUM 100 MG/ML
40 INJECTION SUBCUTANEOUS DAILY
Status: DISCONTINUED | OUTPATIENT
Start: 2023-01-11 | End: 2023-01-12 | Stop reason: HOSPADM

## 2023-01-11 RX ORDER — LISINOPRIL 10 MG/1
1 TABLET ORAL DAILY
Status: CANCELLED | OUTPATIENT
Start: 2023-01-11

## 2023-01-11 RX ADMIN — ACETAMINOPHEN 650 MG: 325 TABLET ORAL at 21:13

## 2023-01-11 RX ADMIN — MORPHINE SULFATE 4 MG: 4 INJECTION, SOLUTION INTRAMUSCULAR; INTRAVENOUS at 15:21

## 2023-01-11 RX ADMIN — ONDANSETRON 4 MG: 2 INJECTION INTRAMUSCULAR; INTRAVENOUS at 15:21

## 2023-01-11 RX ADMIN — DEXTROSE AND SODIUM CHLORIDE: 5; 450 INJECTION, SOLUTION INTRAVENOUS at 23:51

## 2023-01-11 RX ADMIN — HYDROMORPHONE HYDROCHLORIDE 0.5 MG: 1 INJECTION, SOLUTION INTRAMUSCULAR; INTRAVENOUS; SUBCUTANEOUS at 18:38

## 2023-01-11 RX ADMIN — HYDROMORPHONE HYDROCHLORIDE 0.5 MG: 1 INJECTION, SOLUTION INTRAMUSCULAR; INTRAVENOUS; SUBCUTANEOUS at 22:21

## 2023-01-11 RX ADMIN — OXYCODONE AND ACETAMINOPHEN 1 TABLET: 5; 325 TABLET ORAL at 13:01

## 2023-01-11 RX ADMIN — ENOXAPARIN SODIUM 40 MG: 100 INJECTION SUBCUTANEOUS at 23:56

## 2023-01-11 ASSESSMENT — PAIN DESCRIPTION - ORIENTATION
ORIENTATION: LEFT

## 2023-01-11 ASSESSMENT — ENCOUNTER SYMPTOMS
WHEEZING: 0
VOMITING: 0
NAUSEA: 0
DIARRHEA: 0
RHINORRHEA: 0
ABDOMINAL PAIN: 0
SHORTNESS OF BREATH: 0
COUGH: 0

## 2023-01-11 ASSESSMENT — PAIN DESCRIPTION - LOCATION
LOCATION: LEG
LOCATION: HIP
LOCATION: LEG

## 2023-01-11 ASSESSMENT — PAIN DESCRIPTION - DESCRIPTORS
DESCRIPTORS: CRUSHING;ACHING;THROBBING
DESCRIPTORS: ACHING
DESCRIPTORS: ACHING

## 2023-01-11 ASSESSMENT — PAIN SCALES - GENERAL
PAINLEVEL_OUTOF10: 8
PAINLEVEL_OUTOF10: 8
PAINLEVEL_OUTOF10: 9
PAINLEVEL_OUTOF10: 9
PAINLEVEL_OUTOF10: 10
PAINLEVEL_OUTOF10: 9

## 2023-01-11 ASSESSMENT — LIFESTYLE VARIABLES
HOW MANY STANDARD DRINKS CONTAINING ALCOHOL DO YOU HAVE ON A TYPICAL DAY: PATIENT DOES NOT DRINK
HOW OFTEN DO YOU HAVE A DRINK CONTAINING ALCOHOL: NEVER

## 2023-01-11 ASSESSMENT — PAIN DESCRIPTION - PAIN TYPE: TYPE: ACUTE PAIN

## 2023-01-11 ASSESSMENT — PAIN DESCRIPTION - FREQUENCY: FREQUENCY: INTERMITTENT

## 2023-01-11 ASSESSMENT — PAIN - FUNCTIONAL ASSESSMENT: PAIN_FUNCTIONAL_ASSESSMENT: INTOLERABLE, UNABLE TO DO ANY ACTIVE OR PASSIVE ACTIVITIES

## 2023-01-11 ASSESSMENT — PAIN DESCRIPTION - ONSET: ONSET: ON-GOING

## 2023-01-11 NOTE — CONSULTS
[facility] Orthopedic Surgery  Consult Note    Patient: Jimmy Mohan Date: 1/11/2023  Requesting Physician: Rufina Monaco PA-C  Room: ED-0010/10    Chief complaint: LEFT hip pain    HPI: Awilda Torres is a 81 y.o. male who presented to [facility] ER today with left hip pain after he fell outside earlier today. Describes pain in the left hip of moderate intensity and of sharp nature since the fall which is relieved by rest and pain medications. Denies new numbness/tingling. Imaging review of the left hip via plain films demonstrated: minimally displaced fracture of the greater trochanter around a stable appearing total hip arthroplasty construct. Patient lives in private home and uses no assistive devices to ambulate. He still golfs regularly good good weather. Has baseline dementia. Adult son/daughter at bedside. Medical History:  Past Medical History:   Diagnosis Date    Benign prostatic hypertrophy without urinary obstruction     Bradycardia 01/15/2013    COPD (chronic obstructive pulmonary disease) (HCC)     Dementia (HCC)     Glaucoma     Gout     Hyperlipidemia     Hypertension     Hypoxemia     Macular degeneration disease     Osteoarthritis     Sleep apnea     Unspecified sleep apnea      Past Surgical History:   Procedure Laterality Date    COLONOSCOPY      JOINT REPLACEMENT      hip blateral    KNEE SURGERY      TOTAL HIP ARTHROPLASTY  03/2005    TURP         Social History:    reports that he quit smoking about 44 years ago. He smoked an average of .5 packs per day. He has never used smokeless tobacco.    Family History:        Problem Relation Age of Onset    Heart Disease Mother     Sudden Death Father 68        drowning       Medications:  ALL MEDICATIONS HAVE BEEN REVIEWED:  Scheduled:  Continuous:  PRN:    Allergies: No Known Allergies    Review of Systems:  Constitutional: Negative for fever, chills, fatigue.    Skin:  Negative for pruritis, rash  Eyes: Negative for photophobia and visual disturbance. ENT:  Negative for rhinorrhea, epistaxis, sore throat  Respiratory:  Negative for cough and shortness of breath. Cardiovascular: Negative for chest pain. Gastrointestinal: Negative for nausea, vomiting, diarrhea. Genitourinary: Negative for dysuria and difficulty urinating. Neurological: Negative for confusion, dysarthria, tremors, seizures. Psychiatric:  Negative for depression or anxiety  Musculoskeletal:  Positive for left hip pain. Objective:  Vitals:    01/11/23 1237   BP: (!) 147/95   Pulse: 67   Resp: 19   Temp: 97.1 °F (36.2 °C)   SpO2: 97%      Physical Examination:  GENERAL: No apparent distress, well-nourished  SKIN:  Warm and dry  EYES: Nonicteric. ENT: Mucous membranes moist  HEAD: Normocephalic, atraumatic  RESPIRATORY: Resp easy and unlabored  CARDIOVASCULAR: Regular rate and rhythm  GI: Abdomen soft, nontender  NEURO: Awake and alert. No speech defect  PSYCHIATRIC: Appropriate affect; not agitated  MUSCULOSKELETAL:  LEFT hip  Inspection: On exam there are no ulcerations, rashes or lesions about the left hip. There is pain to palpation of the left greater trochanter. Healed surgical incision. No open areas. Motor: Intact DF/PF on the left  Sensation: Grossly intact to light touch throughout the left lower extremity in all nerve distributions. Vascular:  2+ left DP pulse. Labs reviewed:  No results for input(s): WBC, HGB, HCT, PLT in the last 72 hours. No results for input(s): NA, K, CL, CO2, BUN, CREATININE, GLUCOSE, CALCIUM, MG, PHOS in the last 72 hours. No results for input(s): INR, PROTIME in the last 72 hours.     Lab Results   Component Value Date    COLORU YELLOW 02/24/2012    CLARITYU CLEAR 02/24/2012    PHUR 7.5 02/24/2012    GLUCOSEU NEGATIVE 02/24/2012    BLOODU NEGATIVE 02/24/2012    LEUKOCYTESUR NEGATIVE 02/24/2012    BILIRUBINUR NEGATIVE 02/24/2012    UROBILINOGEN 0.2 02/24/2012       Imaging:  XR HIP 2-3 VW W PELVIS LEFT Final Result   1. No acute abnormality. CT HIP LEFT WO CONTRAST    (Results Pending)       IMPRESSION:  LEFT minimally displaced greater trochanter fracture with stable total hip arthroplasty  Dementia  Active Problems:    * No active hospital problems. *  Resolved Problems:    * No resolved hospital problems. *      RECOMMENDATIONS:  Given the alignment and stable appearing femoral stem, we can attempt to treat this non-operatively. - 50% WB LLE with walker; NO active left hip ABDuction  - Ice therapy  - Pain control  - DVT prophylaxis: per primary team  - PT/OT  - Dispo: per primary team    Patient denies tobacco use. I have reviewed imaging and plan with Dr. Edward Martin.       Dev Colby, APRN - CNP  1/11/2023  1:39 PM

## 2023-01-11 NOTE — ED NOTES
Report given to [facility] RN. No further questions at this time.       Ralf Cerna RN  01/11/23 5760

## 2023-01-11 NOTE — ED PROVIDER NOTES
[address] [facility]        Pt Name: Licha Sanderson  MRN: [id number]  Mirtagfnereida 1935  Date of evaluation: 1/11/2023  Provider: Shonda Strong PA-C  PCP: Wilma Payton DO  Note Started: 1:06 PM EST 1/11/23      NADER. I have evaluated this patient. My supervising physician was available for consultation. CHIEF COMPLAINT       Chief Complaint   Patient presents with    Fall     FF EMS from home d/t fall that happened 45 mins ago. Pt states he is unsure how he fell but he was outside at the time. Denies any dizziness prior to falling, denies hitting head. C/o left hip pain. No shortening or rotation noted. Denies being on blood thinners        HISTORY OF PRESENT ILLNESS: 1 or more Elements     History From: patient, ems  Limitations to history : None    Morgan A Rosana Brittle is a 81 y.o. male who presents for evaluation of left hip pain status post mechanical fall that occurred approximately 45 minutes prior to arrival.  Patient states that he was outside and his neighbors had seen him fall. He is not sure what caused the fall but denies any preceding symptoms such as dizziness/lightheadedness, chest pain, shortness of breath. No syncope. He denies any his head or any loss of consciousness. No numbness tingling or weakness distally. He is not anticoagulated. No other injuries or complaints at this time. Nursing Notes were all reviewed and agreed with or any disagreements were addressed in the HPI. REVIEW OF SYSTEMS :      Review of Systems   Constitutional:  Negative for appetite change, chills and fever. HENT:  Negative for congestion and rhinorrhea. Respiratory:  Negative for cough, shortness of breath and wheezing. Cardiovascular:  Negative for chest pain. Gastrointestinal:  Negative for abdominal pain, diarrhea, nausea and vomiting. Genitourinary:  Negative for difficulty urinating, dysuria and hematuria. Musculoskeletal:  Positive for arthralgias (L hip). Negative for neck pain and neck stiffness. Skin:  Negative for rash. Neurological:  Negative for weakness, numbness and headaches. Positives and Pertinent negatives as per HPI. SURGICAL HISTORY     Past Surgical History:   Procedure Laterality Date    COLONOSCOPY      JOINT REPLACEMENT      hip blateral    KNEE SURGERY      TOTAL HIP ARTHROPLASTY  2005    TURP         CURRENTMEDICATIONS       Previous Medications    BIMATOPROST (LUMIGAN) 0.03 % OPHTHALMIC DROPS    1 drop    DONEPEZIL (ARICEPT) 10 MG TABLET    Take 1 tablet by mouth nightly    LISINOPRIL (PRINIVIL;ZESTRIL) 10 MG TABLET    TAKE ONE TABLET BY MOUTH DAILY       ALLERGIES     Patient has no known allergies. FAMILYHISTORY       Family History   Problem Relation Age of Onset    Heart Disease Mother     Sudden Death Father 68        drowning        SOCIAL HISTORY       Social History     Tobacco Use    Smoking status: Former     Packs/day: 0.50     Types: Cigarettes     Quit date: 1978     Years since quittin.7    Smokeless tobacco: Never   Substance Use Topics    Alcohol use: No    Drug use: No       SCREENINGS        Arvind Coma Scale  Eye Opening: Spontaneous  Best Verbal Response: Oriented  Best Motor Response: Obeys commands  [place] Coma Scale Score: 15                CIWA Assessment  BP: (!) 147/95  Heart Rate: 67           PHYSICAL EXAM  1 or more Elements     ED Triage Vitals [23 1237]   BP Temp Temp Source Heart Rate Resp SpO2 Height Weight   (!) 147/95 97.1 °F (36.2 °C) Oral 67 19 97 % -- 185 lb (83.9 kg)       Physical Exam  Vitals and nursing note reviewed. Constitutional:       Appearance: He is well-developed. He is not diaphoretic. HENT:      Head: Normocephalic and atraumatic. Right Ear: External ear normal.      Left Ear: External ear normal.      Nose: Nose normal.   Eyes:      General:         Right eye: No discharge.          Left eye: No discharge. Cardiovascular:      Pulses: Normal pulses. Pulmonary:      Effort: Pulmonary effort is normal. No respiratory distress. Musculoskeletal:      Cervical back: Normal range of motion and neck supple. Left hip: Tenderness present. No deformity, bony tenderness or crepitus. Decreased range of motion. Skin:     General: Skin is warm and dry. Neurological:      Mental Status: He is alert and oriented to person, place, and time. Psychiatric:         Behavior: Behavior normal.       Left pelvic pain. No instability. Range of motion decreased secondary to pain. DIAGNOSTIC RESULTS   LABS:    Labs Reviewed   CBC WITH AUTO DIFFERENTIAL   BASIC METABOLIC PANEL       When ordered only abnormal lab results are displayed. All other labs were within normal range or not returned as of this dictation. EKG: When ordered, EKG's are interpreted by the Emergency Department Physician in the absence of a cardiologist.  Please see their note for interpretation of EKG. RADIOLOGY:   Non-plain film images such as CT, Ultrasound and MRI are read by the radiologist. Plain radiographic images are visualized and preliminarily interpreted by the ED Provider with the below findings:    Greater trochanteric fracture. Interpretation per the Radiologist below, if available at the time of this note:    XR HIP 2-3 VW W PELVIS LEFT   Final Result   1. No acute abnormality. No results found. No results found. PROCEDURES   Unless otherwise noted below, none     Procedures    CRITICAL CARE TIME (.cctime)       PAST MEDICAL HISTORY      has a past medical history of Benign prostatic hypertrophy without urinary obstruction, Bradycardia (01/15/2013), COPD (chronic obstructive pulmonary disease) ([facility] [address] Dementia ([facility] [address] Glaucoma, Gout, Hyperlipidemia, Hypertension, Hypoxemia, Macular degeneration disease, Osteoarthritis, Sleep apnea, and Unspecified sleep apnea.      EMERGENCY DEPARTMENT COURSE and DIFFERENTIAL DIAGNOSIS/MDM:   Vitals:    Vitals:    01/11/23 1237   BP: (!) 147/95   Pulse: 67   Resp: 19   Temp: 97.1 °F (36.2 °C)   TempSrc: Oral   SpO2: 97%   Weight: 185 lb (83.9 kg)       Patient was given the following medications:  Medications   oxyCODONE-acetaminophen (PERCOCET) 5-325 MG per tablet 1 tablet (1 tablet Oral Given 1/11/23 1301)             Is this patient to be included in the SEP-1 Core Measure due to severe sepsis or septic shock? No   Exclusion criteria - the patient is NOT to be included for SEP-1 Core Measure due to: Infection is not suspected    Chronic Conditions affecting care:    has a past medical history of Benign prostatic hypertrophy without urinary obstruction, Bradycardia (01/15/2013), COPD (chronic obstructive pulmonary disease) ([facility] [address] Dementia ([facility] [address] Glaucoma, Gout, Hyperlipidemia, Hypertension, Hypoxemia, Macular degeneration disease, Osteoarthritis, Sleep apnea, and Unspecified sleep apnea. CONSULTS: (Who and What was discussed)  None      Social Determinants : None    Records Reviewed (Source): [facility]    CC/HPI Summary, DDx, ED Course, and Reassessment: Patient presents for evaluation of left hip pain status post fall. On exam, he is resting comfortably in bed no acute distress and nontoxic. Vitals are stable and he is afebrile. He is alert and oriented x3. GCS 15. Cranial nerves II through XII are intact. Scalp is atraumatic, neck and back are nontender. He has pain with range of motion of the left hip with some pelvic tenderness. No obvious deformity, step-offs or crepitus of the left proximal femur. He has neurovascularly intact distally. Given Percocet for symptomatic relief and will be reevaluated.       Disposition Considerations (tests considered but not done, Admit vs D/C, Shared Decision Making, Pt Expectation of Test or Tx.): X-ray initially read as no acute abnormality, however, I consulted orthopedic surgery who does confirm a greater trochanteric fracture. Came down and evaluated the patient at bedside. Patient is not able to ambulate or bear weight and believes not safe for discharge home at this time. He will be admitted for PT/OT evaluation and will likely require placement for rehab. I spoke with the admitting physician, Dr. Owen Huber, who is agreeable to this plan and will resume care the patient at this time. Basic labs pending. Patient family informed and agreeable. He is stable for admission. I am the Primary Clinician of Record. FINAL IMPRESSION      1. Closed nondisplaced fracture of greater trochanter of left femur, initial encounter [facility])          DISPOSITION/PLAN     DISPOSITION Decision To Admit 01/11/2023 02:12:17 PM      PATIENT REFERRED TO:  No follow-up provider specified.     DISCHARGE MEDICATIONS:  New Prescriptions    No medications on file       DISCONTINUED MEDICATIONS:  Discontinued Medications    No medications on file              (Please note that portions of this note were completed with a voice recognition program.  Efforts were made to edit the dictations but occasionally words are mis-transcribed.)    Barbie Roberts PA-C (electronically signed)            Cary Mueller PA-C  01/11/23 5198

## 2023-01-12 ENCOUNTER — HOSPITAL ENCOUNTER (INPATIENT)
Age: 88
DRG: 536 | End: 2023-01-12
Attending: PHYSICAL MEDICINE & REHABILITATION | Admitting: PHYSICAL MEDICINE & REHABILITATION
Payer: MEDICARE

## 2023-01-12 VITALS
RESPIRATION RATE: 18 BRPM | BODY MASS INDEX: 25.9 KG/M2 | HEIGHT: 71 IN | TEMPERATURE: 97.6 F | SYSTOLIC BLOOD PRESSURE: 137 MMHG | HEART RATE: 58 BPM | OXYGEN SATURATION: 95 % | WEIGHT: 185 LBS | DIASTOLIC BLOOD PRESSURE: 79 MMHG

## 2023-01-12 PROBLEM — F02.80 ALZHEIMER'S DEMENTIA (HCC): Status: ACTIVE | Noted: 2023-01-12

## 2023-01-12 PROBLEM — S72.92XS CLOSED FRACTURE OF LEFT FEMUR, SEQUELA: Status: ACTIVE | Noted: 2023-01-12

## 2023-01-12 PROBLEM — G30.9 ALZHEIMER'S DEMENTIA (HCC): Status: ACTIVE | Noted: 2023-01-12

## 2023-01-12 PROBLEM — R27.0 ATAXIA: Status: ACTIVE | Noted: 2023-01-12

## 2023-01-12 PROBLEM — R26.2 UNABLE TO AMBULATE: Status: ACTIVE | Noted: 2023-01-12

## 2023-01-12 PROBLEM — R26.89 IMBALANCE: Status: ACTIVE | Noted: 2023-01-12

## 2023-01-12 LAB
ANION GAP SERPL CALCULATED.3IONS-SCNC: 7 MMOL/L (ref 3–16)
BUN BLDV-MCNC: 13 MG/DL (ref 7–20)
CALCIUM SERPL-MCNC: 8.7 MG/DL (ref 8.3–10.6)
CHLORIDE BLD-SCNC: 106 MMOL/L (ref 99–110)
CO2: 28 MMOL/L (ref 21–32)
CREAT SERPL-MCNC: 0.8 MG/DL (ref 0.8–1.3)
GFR SERPL CREATININE-BSD FRML MDRD: >60 ML/MIN/{1.73_M2}
GLUCOSE BLD-MCNC: 119 MG/DL (ref 70–99)
HCT VFR BLD CALC: 35.6 % (ref 40.5–52.5)
HEMOGLOBIN: 11.8 G/DL (ref 13.5–17.5)
MCH RBC QN AUTO: 30.7 PG (ref 26–34)
MCHC RBC AUTO-ENTMCNC: 33.2 G/DL (ref 31–36)
MCV RBC AUTO: 92.4 FL (ref 80–100)
PDW BLD-RTO: 12.7 % (ref 12.4–15.4)
PLATELET # BLD: 205 K/UL (ref 135–450)
PMV BLD AUTO: 7.7 FL (ref 5–10.5)
POTASSIUM SERPL-SCNC: 4.9 MMOL/L (ref 3.5–5.1)
RBC # BLD: 3.85 M/UL (ref 4.2–5.9)
SODIUM BLD-SCNC: 141 MMOL/L (ref 136–145)
WBC # BLD: 6.2 K/UL (ref 4–11)

## 2023-01-12 PROCEDURE — 36415 COLL VENOUS BLD VENIPUNCTURE: CPT

## 2023-01-12 PROCEDURE — 6370000000 HC RX 637 (ALT 250 FOR IP): Performed by: PHYSICAL MEDICINE & REHABILITATION

## 2023-01-12 PROCEDURE — 97530 THERAPEUTIC ACTIVITIES: CPT

## 2023-01-12 PROCEDURE — 85027 COMPLETE CBC AUTOMATED: CPT

## 2023-01-12 PROCEDURE — 6360000002 HC RX W HCPCS: Performed by: INTERNAL MEDICINE

## 2023-01-12 PROCEDURE — 99232 SBSQ HOSP IP/OBS MODERATE 35: CPT | Performed by: NURSE PRACTITIONER

## 2023-01-12 PROCEDURE — G0378 HOSPITAL OBSERVATION PER HR: HCPCS

## 2023-01-12 PROCEDURE — 6360000002 HC RX W HCPCS: Performed by: PHYSICAL MEDICINE & REHABILITATION

## 2023-01-12 PROCEDURE — 1280000000 HC REHAB R&B

## 2023-01-12 PROCEDURE — 97167 OT EVAL HIGH COMPLEX 60 MIN: CPT

## 2023-01-12 PROCEDURE — 6370000000 HC RX 637 (ALT 250 FOR IP): Performed by: INTERNAL MEDICINE

## 2023-01-12 PROCEDURE — 96376 TX/PRO/DX INJ SAME DRUG ADON: CPT

## 2023-01-12 PROCEDURE — 97162 PT EVAL MOD COMPLEX 30 MIN: CPT

## 2023-01-12 PROCEDURE — 6370000000 HC RX 637 (ALT 250 FOR IP): Performed by: NURSE PRACTITIONER

## 2023-01-12 PROCEDURE — 80048 BASIC METABOLIC PNL TOTAL CA: CPT

## 2023-01-12 RX ORDER — OXYCODONE HYDROCHLORIDE 5 MG/1
5 TABLET ORAL EVERY 4 HOURS PRN
Status: DISCONTINUED | OUTPATIENT
Start: 2023-01-12 | End: 2023-01-13

## 2023-01-12 RX ORDER — OXYCODONE HYDROCHLORIDE 5 MG/1
5 TABLET ORAL EVERY 4 HOURS PRN
Status: CANCELLED | OUTPATIENT
Start: 2023-01-12

## 2023-01-12 RX ORDER — ACETAMINOPHEN 500 MG
1000 TABLET ORAL 3 TIMES DAILY
Status: CANCELLED | OUTPATIENT
Start: 2023-01-12

## 2023-01-12 RX ORDER — LISINOPRIL 10 MG/1
10 TABLET ORAL DAILY
COMMUNITY

## 2023-01-12 RX ORDER — DONEPEZIL HYDROCHLORIDE 10 MG/1
10 TABLET, FILM COATED ORAL NIGHTLY
COMMUNITY
End: 2023-01-27

## 2023-01-12 RX ORDER — ENOXAPARIN SODIUM 100 MG/ML
40 INJECTION SUBCUTANEOUS DAILY
Status: DISCONTINUED | OUTPATIENT
Start: 2023-01-12 | End: 2023-01-26 | Stop reason: HOSPADM

## 2023-01-12 RX ORDER — ONDANSETRON 4 MG/1
4 TABLET, ORALLY DISINTEGRATING ORAL EVERY 8 HOURS PRN
Status: DISCONTINUED | OUTPATIENT
Start: 2023-01-12 | End: 2023-01-26 | Stop reason: HOSPADM

## 2023-01-12 RX ORDER — ENOXAPARIN SODIUM 100 MG/ML
40 INJECTION SUBCUTANEOUS DAILY
Status: CANCELLED | OUTPATIENT
Start: 2023-01-12

## 2023-01-12 RX ORDER — ONDANSETRON 4 MG/1
4 TABLET, ORALLY DISINTEGRATING ORAL EVERY 8 HOURS PRN
Status: CANCELLED | OUTPATIENT
Start: 2023-01-12

## 2023-01-12 RX ORDER — OXYCODONE HYDROCHLORIDE 5 MG/1
5 TABLET ORAL EVERY 4 HOURS PRN
Status: DISCONTINUED | OUTPATIENT
Start: 2023-01-12 | End: 2023-01-12 | Stop reason: HOSPADM

## 2023-01-12 RX ORDER — MEMANTINE HYDROCHLORIDE 5 MG/1
5 TABLET ORAL 2 TIMES DAILY
Qty: 60 TABLET | Refills: 0 | Status: ON HOLD
Start: 2023-01-12 | End: 2023-02-07 | Stop reason: HOSPADM

## 2023-01-12 RX ORDER — TRAMADOL HYDROCHLORIDE 50 MG/1
50 TABLET ORAL EVERY 6 HOURS PRN
Status: DISCONTINUED | OUTPATIENT
Start: 2023-01-12 | End: 2023-01-12 | Stop reason: HOSPADM

## 2023-01-12 RX ORDER — TRAZODONE HYDROCHLORIDE 50 MG/1
50 TABLET ORAL NIGHTLY PRN
Status: DISCONTINUED | OUTPATIENT
Start: 2023-01-12 | End: 2023-01-23

## 2023-01-12 RX ORDER — OXYCODONE HYDROCHLORIDE 5 MG/1
10 TABLET ORAL EVERY 4 HOURS PRN
Status: CANCELLED | OUTPATIENT
Start: 2023-01-12

## 2023-01-12 RX ORDER — DIPHENHYDRAMINE HCL 25 MG
25 TABLET ORAL EVERY 6 HOURS PRN
Status: DISCONTINUED | OUTPATIENT
Start: 2023-01-12 | End: 2023-01-26 | Stop reason: HOSPADM

## 2023-01-12 RX ORDER — OXYCODONE HYDROCHLORIDE 5 MG/1
10 TABLET ORAL EVERY 4 HOURS PRN
Status: DISCONTINUED | OUTPATIENT
Start: 2023-01-12 | End: 2023-01-13

## 2023-01-12 RX ORDER — DIPHENHYDRAMINE HCL 25 MG
25 TABLET ORAL EVERY 6 HOURS PRN
Status: CANCELLED | OUTPATIENT
Start: 2023-01-12

## 2023-01-12 RX ORDER — ACETAMINOPHEN 500 MG
1000 TABLET ORAL 3 TIMES DAILY
Status: DISCONTINUED | OUTPATIENT
Start: 2023-01-12 | End: 2023-01-12 | Stop reason: HOSPADM

## 2023-01-12 RX ORDER — ACETAMINOPHEN 500 MG
1000 TABLET ORAL 3 TIMES DAILY
Status: DISCONTINUED | OUTPATIENT
Start: 2023-01-12 | End: 2023-01-26 | Stop reason: HOSPADM

## 2023-01-12 RX ORDER — HYDRALAZINE HYDROCHLORIDE 25 MG/1
50 TABLET, FILM COATED ORAL EVERY 8 HOURS PRN
Status: DISCONTINUED | OUTPATIENT
Start: 2023-01-12 | End: 2023-01-26 | Stop reason: HOSPADM

## 2023-01-12 RX ORDER — TRAZODONE HYDROCHLORIDE 50 MG/1
50 TABLET ORAL NIGHTLY PRN
Status: CANCELLED | OUTPATIENT
Start: 2023-01-12

## 2023-01-12 RX ORDER — TRAMADOL HYDROCHLORIDE 50 MG/1
50 TABLET ORAL EVERY 6 HOURS PRN
Qty: 20 TABLET | Refills: 0 | Status: ON HOLD | OUTPATIENT
Start: 2023-01-12 | End: 2023-01-26 | Stop reason: HOSPADM

## 2023-01-12 RX ORDER — HYDRALAZINE HYDROCHLORIDE 25 MG/1
50 TABLET, FILM COATED ORAL EVERY 8 HOURS PRN
Status: CANCELLED | OUTPATIENT
Start: 2023-01-12

## 2023-01-12 RX ADMIN — ACETAMINOPHEN 1000 MG: 500 TABLET ORAL at 20:49

## 2023-01-12 RX ADMIN — OXYCODONE HYDROCHLORIDE 10 MG: 5 TABLET ORAL at 21:58

## 2023-01-12 RX ADMIN — OXYCODONE HYDROCHLORIDE 10 MG: 5 TABLET ORAL at 17:14

## 2023-01-12 RX ADMIN — ENOXAPARIN SODIUM 40 MG: 100 INJECTION SUBCUTANEOUS at 20:49

## 2023-01-12 RX ADMIN — OXYCODONE 5 MG: 5 TABLET ORAL at 12:58

## 2023-01-12 RX ADMIN — ACETAMINOPHEN 650 MG: 325 TABLET ORAL at 09:37

## 2023-01-12 RX ADMIN — TRAZODONE HYDROCHLORIDE 50 MG: 50 TABLET ORAL at 20:48

## 2023-01-12 RX ADMIN — HYDROMORPHONE HYDROCHLORIDE 0.5 MG: 1 INJECTION, SOLUTION INTRAMUSCULAR; INTRAVENOUS; SUBCUTANEOUS at 05:43

## 2023-01-12 RX ADMIN — ONDANSETRON 4 MG: 2 INJECTION INTRAMUSCULAR; INTRAVENOUS at 05:47

## 2023-01-12 ASSESSMENT — PAIN DESCRIPTION - ONSET
ONSET: ON-GOING
ONSET: ON-GOING

## 2023-01-12 ASSESSMENT — PAIN DESCRIPTION - DESCRIPTORS
DESCRIPTORS: ACHING
DESCRIPTORS: ACHING;DISCOMFORT;SORE
DESCRIPTORS: ACHING;DISCOMFORT;SORE
DESCRIPTORS: ACHING
DESCRIPTORS: ACHING

## 2023-01-12 ASSESSMENT — PAIN SCALES - GENERAL
PAINLEVEL_OUTOF10: 5
PAINLEVEL_OUTOF10: 6
PAINLEVEL_OUTOF10: 5
PAINLEVEL_OUTOF10: 8
PAINLEVEL_OUTOF10: 7
PAINLEVEL_OUTOF10: 7
PAINLEVEL_OUTOF10: 3

## 2023-01-12 ASSESSMENT — PAIN - FUNCTIONAL ASSESSMENT
PAIN_FUNCTIONAL_ASSESSMENT: PREVENTS OR INTERFERES WITH ALL ACTIVE AND SOME PASSIVE ACTIVITIES
PAIN_FUNCTIONAL_ASSESSMENT: PREVENTS OR INTERFERES SOME ACTIVE ACTIVITIES AND ADLS

## 2023-01-12 ASSESSMENT — PAIN DESCRIPTION - LOCATION
LOCATION: HIP

## 2023-01-12 ASSESSMENT — PAIN DESCRIPTION - ORIENTATION
ORIENTATION: LEFT

## 2023-01-12 ASSESSMENT — PAIN DESCRIPTION - PAIN TYPE
TYPE: ACUTE PAIN
TYPE: ACUTE PAIN

## 2023-01-12 ASSESSMENT — PAIN DESCRIPTION - FREQUENCY
FREQUENCY: CONTINUOUS
FREQUENCY: CONTINUOUS

## 2023-01-12 ASSESSMENT — PAIN SCALES - WONG BAKER: WONGBAKER_NUMERICALRESPONSE: 2

## 2023-01-12 NOTE — PROGRESS NOTES
Consult in chart for pt to be seen by Abril Garcia, outreach call to Abril Garcia, per Abril Garcia he has seen pt and no longer will be followed.      Sharon CRAIGN, RN   [phone]

## 2023-01-12 NOTE — PLAN OF CARE
Problem: Discharge Planning  Goal: Discharge to home or other facility with appropriate resources  Outcome: Progressing  Flowsheets (Taken 1/11/2023 7068 by Logan Gómez RN)  Discharge to home or other facility with appropriate resources: Identify barriers to discharge with patient and caregiver     Problem: Pain  Goal: Verbalizes/displays adequate comfort level or baseline comfort level  Outcome: Progressing     Problem: Skin/Tissue Integrity  Goal: Absence of new skin breakdown  Description: 1. Monitor for areas of redness and/or skin breakdown  2. Assess vascular access sites hourly  3. Every 4-6 hours minimum:  Change oxygen saturation probe site  4. Every 4-6 hours:  If on nasal continuous positive airway pressure, respiratory therapy assess nares and determine need for appliance change or resting period.   Outcome: Progressing

## 2023-01-12 NOTE — PROGRESS NOTES
[facility]                                            Advanced Care Planning Note. Purpose of Encounter: Advanced care planning in light of advanced age and dementia  Parties In Attendance: Patient,  Patient's son Vin Funez    Decisional Capacity: Yes only partial  Subjective: Patient/family understand that this conversation is to address long term care goal  Objective:   CPR-No   Intubation-No  Mechanical Vent-No  Defibrillation-No   Gastrostomy feeding-No   Hemodialysis-no   Goals of Care Determination: Patient/POA  Son Vin Funez   Code Status: DNR CC  Time spent on Advanced care Plannin minutes   Advanced Care Planning Documents: Completed advanced directives on chart, Vni Funez  is the POA.     Lazarus Powers, MD  2023 11:49 AM

## 2023-01-12 NOTE — CONSULTS
Shruthi Venu  1/12/2023  [id number]    Rehab Brief Consult:    Patient is able to tolerate 3 hours of therapy 5 days per week and is medically appropriate for rehab at the Acute Rehabilitation Unit. Approved for admission today. Orders placed for transfer. Thank you for the consultation.     Mary Carranza MD 1/12/2023 12:03 PM

## 2023-01-12 NOTE — PLAN OF CARE
ARU PATIENT TREATMENT PLAN  [facility]  [address] [facility], [address] [place] [address] Drive  [phone]      Neisha Streeter    : 1935  Acct #: [de-identified]  MRN: [id number]  PHYSICIAN:  Juan M Goldstein MD  Primary Problem    Patient Active Problem List   Diagnosis    Benign prostatic hyperplasia without urinary obstruction    Mixed hyperlipidemia    Bradycardia    Primary hypertension    Pre-diabetes    Primary osteoarthritis of right knee    Memory impairment    Nipple soreness    Skin lesion on examination    Late onset Alzheimer's dementia without behavioral disturbance (HCC)    Vitamin D insufficiency    Nondisplaced fracture of greater trochanter of left femur, initial encounter for closed fracture ([facility] [address]    Unable to ambulate    Alzheimer's dementia ([facility] [address]    Imbalance    Ataxia    Closed fracture of left femur, sequela       Rehabilitation Diagnosis:      Left greater trochanter femur fracture: 50% WBAT in LLE. PT/OT. Pain control. Dementia: resume namenda and aricept per home regimen. Seroquel 25 as needed     HTN: hold lisinopril 10 held. ADMIT DATE:2023    Patient Goals: Return home at [facility]. Admitting Impairments: Orthopedic Disorder - 8.11 - Unilateral Hip Fracture  Activities: Impaired Hygiene, Toileting, Bathing, Dressing, Bed Mobility, Transfers, Ambulation, Stairs, and Endurance. Participation: Prior to admission patient was living at home alone, was independent with all mobility and activities, was an active .      CARE PLAN     NURSING:  Neisha Streeter while on this unit will:  [x] Be continent of bowel and bladder     [] Have an adequate number of bowel movements  [x] Urinate with no urinary retention >300ml in bladder  [] Complete bladder protocol with brooks removal  [x] Maintain O2 SATs at _90__%  [x] Have pain managed while on ARU       [] Be pain free by discharge   [x] Have no skin breakdown while on ARU  [] Have improved skin integrity via wound measurements  [] Have no signs/symptoms of infection at the wound site  [x] Be free from injury during hospitalization   [] Complete education with patient/family with understanding demonstrated for:       Nursing Interventions will include:  [] bowel/bladder training   [] education for medical assistive devices   [x] medication education   [] O2 saturation management   [x] energy conservation   [] stress management techniques   [x] fall prevention   [x] alarms protocol   [x] seating and positioning   [] skin/wound care   [] pressure relief instruction   [] dressing changes     [] infection protection   [x] DVT prophylaxis  [x] assistance with in room safety with transfers to bed, toilet, wheelchair, shower   [x] bathroom activities and hygiene      Patient/Caregiver Education for:  [] Disease/sustained injury/management     [x] Medication Use  [] Surgical intervention  [x] Safety  [] Body mechanics and or joint protection  [] Health maintenance       PHYSICAL THERAPY:  Goals:                   Patient Goals: To return home   Short Term Goals:  Time Frame: 2 weeks  Patient will complete bed mobility at modified independent   Patient will complete transfers at [facility]   Patient will ambulate 150 ft with use of rolling walker at modified independent  Patient will ascend/descend 4 stairs with (R) ascending handrail at modified independent  Patient will complete car transfer at [facility]               These goals were reviewed with this patient at the time of assessment and Ritika Rolon is in agreement.      Plan of Care: Pt to be seen 5 out of 7 days per week per ARU protocol ( 90 minutes with PT)                     OCCUPATIONAL THERAPY:  Goals:               Patient Goals: to return to [facility]   Short Term Goals:  Time Frame: 2 weeks   Patient will complete upper body ADL at [facility]   Patient will complete lower body ADL at [facility]   Patient will complete toileting at modified independent   Patient will complete functional transfers at [facility]   Patient to gather and transport IADL items at modified independent       These goals were reviewed with this patient at the time of assessment and Dedrick Wilson is in agreement    Plan of Care:  Pt to be seen 5 out of 7 days per week per ARU protocol ( 90 minutes with OT)    Therapy Treatments will include:  [x]  therapeutic exercises    [x]  gait training     [x]  neuromuscular re-ed                            [x]  transfer training             [] community reintegration    [x] bed mobility                          [x]  w/c mobility and training  [x]  self care    [x]home mgmt    [x]  cognitive training            [x]  energy conservation        []  dysphagia tx    []  speech/language/communication therapy   []  group therapy    [x]  patient/family education    [] Other:    CASE MANAGEMENT:  Goals:   Assist patient/family with discharge planning, patient/family counseling, and coordination with insurance during ARU stay. Dedrick Clementedahlia will be seen a minimum of 3 hours of therapy per day, a minimum of 5 out of 7 days per week  (please see above for specific treatment plan per PT/OT/SLP). [] In this rare instance due to the nature of this patient's medical involvement, this patient will be seen 15 hours per week (900 minutes within a 7 day period). In addition, dietician/nutritionist may monitor calorie count as well as intake and collaboratively work with SLP on dietary upgrades. Neuropsychology/Psychology may evaluate and provide necessary support.     Medical issues being managed closely and that require 24 hour availability of a physician:  [] Swallowing Precautions  [x] Bowel/Bladder Fx  [x] Weight bearing precautions  [] Wound Care    [x] Pain Mgmt   [] Infection Protection  [x] DVT Prophylaxis   [x] Fall Precautions  [x] Fluid/Electrolyte/Nutrition Balance  [] Voice Protection   [x] Respiratory [] Other:    Medical Prognosis: [] Good  [x] Fair    [] Guarded   Total expected IRF days: 14  Anticipated discharge destination: Home  [x] Home Independently   [] Home with supervision    []SNF     [] Other                                           Physician anticipated functional outcomes:  By discharge, patient will progress to being independent with all mobility and activities. IPOC brief synthesis: 50-year-old male with a history of BPH, COPD, HTN, HLD, and dementia who was admitted on 1/11 with left hip pain after a fall. Work-up revealed a greater trochanter fracture of the left femur. Ortho evaluated and suggested 50% weightbearing on the left lower extremity. He was evaluated by therapy and suggested to continue in an inpatient setting prior to returning home. He was previously living independently at home with the assistance of family. I have reviewed this initial plan of care and agree with its contents:    Title   Name    Date    Time    Physician: Electronically signed by Neno Trujillo MD on 1/13/2023 at 3:18 PM      Case Mgmt: Rigo Loan, MSW, LSW, 1/13/2023 @ 8:51.     OT: RIVERA Lancaster OTR/L 1/13/23 1446    PT: Aylin Crouch PT, DPT - [id number], 1/13/2023 2:42 PM    RN: Yissel Barnard, 1/13/23, [address] [place] [address]    :   Caroline Barney PT, DPT [id number]  1/13/23  3:10 PM

## 2023-01-12 NOTE — DISCHARGE INSTR - COC
Continuity of Care Form    Patient Name: Ovidio Lau   :  1935  MRN:  [id number]    Admit date:  2023  Discharge date:  2023    Code Status Order: DNR-CC   Advance Directives:     Admitting Physician:  Indiana Massey MD  PCP: Jarrett Chi DO    Discharging Nurse: [facility] Unit/Room#: 0PD-3009/7679-00  Discharging Unit Phone Number: ***    Emergency Contact:   Extended Emergency Contact Information  Primary Emergency Contact: [address] [place] [address] Street Phone: [phone]  Mobile Phone: [phone]  Relation: Child  Preferred language: English  Secondary Emergency Contact: Roxann Flores  Mobile Phone: [phone]  Relation: Child  Preferred language: English   needed?  No    Past Surgical History:  Past Surgical History:   Procedure Laterality Date    COLONOSCOPY      JOINT REPLACEMENT      hip blateral    KNEE SURGERY      TOTAL HIP ARTHROPLASTY  2005    TURP         Immunization History:   Immunization History   Administered Date(s) Administered    COVID-19, PFIZER PURPLE top, DILUTE for use, (age 15 y+), 30mcg/0.3mL 2021, 2021    Influenza A (P4O6-57) Vaccine PF IM 2009    Influenza Vaccine, unspecified formulation 2016    Influenza Virus Vaccine 10/24/2011, 2015    Influenza Whole 2015    Influenza, High Dose (Fluzone 65 yrs and older) 2016, 10/04/2017, 10/10/2018    Influenza, Triv, inactivated, subunit, adjuvanted, IM (Fluad 65 yrs and older) 2019    Pneumococcal Conjugate 13-valent (Yhavoef52) 2015    Pneumococcal Polysaccharide (Yutnunsdj59) 2007       Active Problems:  Patient Active Problem List   Diagnosis Code    Benign prostatic hyperplasia without urinary obstruction N40.0    Mixed hyperlipidemia E78.2    Bradycardia R00.1    Primary hypertension I10    Pre-diabetes R73.03    Primary osteoarthritis of right knee M17.11    Memory impairment R41.3    Nipple soreness N64.4    Skin lesion on examination L98.9    Late onset Alzheimer's dementia without behavioral disturbance (HCC) G30.1, F02.80    Vitamin D insufficiency E55.9    Nondisplaced fracture of greater trochanter of left femur, initial encounter for closed fracture ([facility] [address] S72.115A    Unable to ambulate R26.2    Alzheimer's dementia (HCC) G30.9, F02.80    Imbalance R26.89    Ataxia R27.0       Isolation/Infection:   Isolation            No Isolation          Patient Infection Status       None to display            Nurse Assessment:  Last Vital Signs: /75   Pulse 69   Temp 97.6 °F (36.4 °C) (Oral)   Resp 18   Ht 5' 11\" (1.803 m)   Wt 185 lb (83.9 kg)   SpO2 94%   BMI 25.80 kg/m²     Last documented pain score (0-10 scale): Pain Level: 6  Last Weight:   Wt Readings from Last 1 Encounters:   01/11/23 185 lb (83.9 kg)     Mental Status:  {IP PT MENTAL STATUS:04550}    IV Access:  {[facility] IV ACCESS:391638928}    Nursing Mobility/ADLs:  Walking   {[facility] DME XDED:332039442}  Transfer  {[facility] DME ZBFE:051414504}  Bathing  {[facility] DME OXZX:012662028}  Dressing  {[facility] DME GZIP:757755591}  Toileting  {[facility] DME BZHA:117085817}  Feeding  {[facility] DME UTHO:704103980}  Med Admin  {[facility] DME FYLJ:540086592}  Med Delivery   {[facility] MED Delivery:634415499}    Wound Care Documentation and Therapy:        Elimination:  Continence: Bowel: {YES / MZ:34958}  Bladder: {YES / UB:00647}  Urinary Catheter: {Urinary Catheter:330048550}   Colostomy/Ileostomy/Ileal Conduit: {YES / WL:67729}       Date of Last BM: ***  No intake or output data in the 24 hours ending 01/12/23 0916  No intake/output data recorded.     Safety Concerns:     508 Dress Code Safety Concerns:313554942}    Impairments/Disabilities:      508 Dress Code Impairments/Disabilities:179549663}    Nutrition Therapy:  Current Nutrition Therapy:   508 Dress Code Diet List:808552933}    Routes of Feeding: {[facility] DME Other Feedings:948968613}  Liquids: {Slp liquid thickness:95264}  Daily Fluid Restriction: {B[employer] [place]OL[employer] Yes amt HMCTNJL:635312301}  Last Modified Barium Swallow with Video (Video Swallowing Test): {Done Not Done ZQGA:231935964}    Treatments at the Time of Hospital Discharge:   Respiratory Treatments: ***  Oxygen Therapy:  {Therapy; copd oxygen:50951}  Ventilator:    {[facility] Vent XFDA:030592789}    Rehab Therapies: {THERAPEUTIC INTERVENTION:0229906683}  Weight Bearing Status/Restrictions: {[facility] Weight Bearin}  Other Medical Equipment (for information only, NOT a DME order):  {EQUIPMENT:732414455}  Other Treatments: ***    Patient's personal belongings (please select all that are sent with patient):  {[facility] DME Belongings:957587293}    RN SIGNATURE:  {Esignature:303575198}    CASE MANAGEMENT/SOCIAL WORK SECTION    Inpatient Status Date: ***    Readmission Risk Assessment Score:  Readmission Risk              Risk of Unplanned Readmission:  8           Discharging to Facility/ Agency   Name:   Address:  Phone:  Fax:    Dialysis Facility (if applicable)   Name:  Address:  Dialysis Schedule:  Phone:  Fax:    / signature: {Esignature:803852187}    PHYSICIAN SECTION    Prognosis: Fair    Condition at Discharge: Stable    Rehab Potential (if transferring to Rehab): Fair    Recommended Labs or Other Treatments After Discharge: PT OT     Physician Certification: I certify the above information and transfer of Ritika Rolon  is necessary for the continuing treatment of the diagnosis listed and that he requires [facility] for less 30 days.      Update Admission H&P: No change in H&P    PHYSICIAN SIGNATURE:  Electronically signed by Lazarus Powers, MD on 23 at 9:17 AM EST

## 2023-01-12 NOTE — PROGRESS NOTES
Report given [facility] on acute rehab unit. Patient to be transferring to room 4903.

## 2023-01-12 NOTE — PROGRESS NOTES
Patient Active Problem List   Diagnosis    Benign prostatic hyperplasia without urinary obstruction    Mixed hyperlipidemia    Bradycardia    Primary hypertension    Pre-diabetes    Primary osteoarthritis of right knee    Memory impairment    Nipple soreness    Skin lesion on examination    Late onset Alzheimer's dementia without behavioral disturbance (HCC)    Vitamin D insufficiency    Nondisplaced fracture of greater trochanter of left femur, initial encounter for closed fracture ([facility] [address]    Unable to ambulate    Alzheimer's dementia ([facility] [address]    Imbalance    Ataxia   H&P dictated

## 2023-01-12 NOTE — PROGRESS NOTES
Patient was admitted to room 4903 at 1520. Patient was oriented to the Call Light, Phone, TV, Thermostat, Bed Controls, Bathroom and Emergency Cord. Patient verbalized and demonstrated understanding of all. Patient was also given an over view of Unit Routines for Acute Rehab. Patient states that their normal bowel regime is daily. Meal times were explained, including how to order food. The white board, (which is posted on the wall by the door is used for communication) has the Therapy Scheduled that is posted each day along with the name of your doctor, nurse, and therapist for your convenience. We recommend any family that will be care givers or any care givers the patient has, take part in therapy. We have no set visiting hours, we suggest non-caregiver friends and family visitors come after therapy (at 4 PM or later) to allow patient to rest in between sessions.       In conjunction with the patient and patients family, this nurse worked to establish a tailored Fall TIPS plan to ensure patient safety and compliance:    Falls TIPS Completion    Patient identified as increased risk for harm if fall:  [x] Yes     Fall Risks  History of Falls:    [x] Yes   Medication Side Effects:   [x] Yes   Walking Aid:    [x] Yes   IV Pole or Equipment:   [] Yes   Unsteady Walk:     [x] Yes   May Forget or Choose Not to Call: [] Yes     Fall Interventions   Communicate Recent Fall and/or Risk of Harm: [x] Yes   Walking Aids:  Crutches: [] Yes   Cane: [] Yes   Walker: [] Yes   Stedy - Yes  IV Assistance When Walking: [] Yes   Toileting Schedule: Every 2 Hours  Bedpan:   [] Yes   Assist to Commode: [] Yes   Assist to Bathroom: [x] Yes   Bed Alarm On: [x] Yes   Assistance Out of Bed:  Bedrest: [] Yes   1 Person: [x] Yes   2 People: [] Yes

## 2023-01-12 NOTE — CARE COORDINATION
SW/CM receive notification that patient has been accepted to ARU and can discharge today. SW/CM informed patient's RN, Lorena Acosta. SW/CM will follow.     Electronically signed by SONDRA Spears on 1/12/2023 at 1:24 PM

## 2023-01-12 NOTE — PROGRESS NOTES
[facility] Orthopedic Surgery  Progress Note    Chief complaint: LEFT hip pain    HPI: The patient is seen sitting up in the chair after having worked with therapy. He is more confused than yesterday. Reports mild pain at rest; moderate to severe with movement. Denies new issues. Review of Systems:  Constitutional: Negative for fever, chills, fatigue. Skin:  Negative for pruritis, rash  Eyes: Negative for photophobia and visual disturbance. ENT:  Negative for rhinorrhea, epistaxis, sore throat  Respiratory:  Negative for cough and shortness of breath. Cardiovascular: Negative for chest pain. Gastrointestinal: Negative for nausea, vomiting, diarrhea. Genitourinary: Negative for dysuria and difficulty urinating. Neurological: Negative for confusion, dysarthria, tremors, seizures. Psychiatric:  Negative for depression or anxiety  Musculoskeletal:  Positive for left hip pain. Objective:  Vitals:    01/12/23 0930   BP: 137/79   Pulse: 58   Resp: 18   Temp:    SpO2: 95%      Physical Examination:  GENERAL: No apparent distress, well-nourished  SKIN:  Warm and dry  EYES: Nonicteric. ENT: Mucous membranes moist  HEAD: Normocephalic, atraumatic  RESPIRATORY: Resp easy and unlabored  CARDIOVASCULAR: Regular rate and rhythm  GI: Abdomen soft, nontender  NEURO: Awake and alert. No speech defect  PSYCHIATRIC: Appropriate affect; not agitated  MUSCULOSKELETAL:  LEFT hip  Inspection: On exam there are no ulcerations, rashes or lesions about the left hip. There is pain to palpation of the left greater trochanter. Healed surgical incision. No open areas. Motor: Intact DF/PF on the left  Sensation: Grossly intact to light touch throughout the left lower extremity in all nerve distributions. Vascular:  2+ left DP pulse.     Labs reviewed:  Recent Labs     01/11/23  1436 01/12/23  0521   WBC 6.8 6.2   HGB 13.0* 11.8*   HCT 40.2* 35.6*    205     Recent Labs     01/11/23  1436 01/12/23  0520    141 K 4.3 4.9    106   CO2 25 28   BUN 16 13   CREATININE 0.8 0.8   GLUCOSE 146* 119*   CALCIUM 8.7 8.7     No results for input(s): INR, PROTIME in the last 72 hours. Lab Results   Component Value Date    COLORU YELLOW 02/24/2012    CLARITYU CLEAR 02/24/2012    PHUR 7.5 02/24/2012    GLUCOSEU NEGATIVE 02/24/2012    BLOODU NEGATIVE 02/24/2012    LEUKOCYTESUR NEGATIVE 02/24/2012    BILIRUBINUR NEGATIVE 02/24/2012    UROBILINOGEN 0.2 02/24/2012       Imaging:  XR HIP 2-3 VW W PELVIS LEFT   Final Result   Addendum (preliminary) 1 of 1   ADDENDUM:   There is an acute nondisplaced fracture involving the left greater    trochanter. Final   1. No acute abnormality. IMPRESSION:  LEFT minimally displaced greater trochanter fracture with stable total hip arthroplasty  Dementia  Principal Problem:    Nondisplaced fracture of greater trochanter of left femur, initial encounter for closed fracture (HCC)  Active Problems:    Unable to ambulate    Alzheimer's dementia ([facility] [address]    Imbalance    Ataxia    Bradycardia    Primary hypertension  Resolved Problems:    * No resolved hospital problems. *      PLAN:  Given the alignment and stable appearing femoral stem, we can attempt to treat this non-operatively. - 50% WB LLE with walker; NO active left hip ABDuction  - Ice therapy  - Pain control: added scheduled tylenol and tramadol prn for d/c. Try and limit narcotics as possible given cognition.   - DVT prophylaxis: per primary team  - PT/OT  - Dispo: per primary team: ARU consulted  Follow-up with Dr. Mcdermott Or in 1 month for repeat films.  [address], APRN - CNP  1/12/2023  12:21 PM

## 2023-01-12 NOTE — PROGRESS NOTES
ARU Admission Assessment    Ethnicity  \"Are you of , /a, or [place]ian origin? \"  Check all that apply:  [x] A. No, not of , /a, or [place] Origin  [] B.  Yes, [place], [place] American, Chicano/a  [] C.  Yes, [address] [place] [address]  [] D.  Yes, [place]  [] E.  Yes, another , , or [place]ian origin  [] X. Patient unable to respond  [] Y. Patient declines to respond    Race  \"What is your race? \"  Check all that apply:  [] A. White  [] B. Black or   [] C. American [place] [facility] or [place] Native  [] D.  [place] [facility]  [] E. [place]  [] F. Czech  [] G. [place]  [] Kate Wil  [] I. [place]  [] J.  Other   [] K.   [] L. Cam[place]onian or [place]  [] M. Finnish  [] N. Other [place]  [] X. Patient unable to respond  [] Y. Patient declines to respond  [x] Z. None of the above    Language  A. \"What is your preferred language? \"   English    B. \"Do you need or want an  to communicate with a doctor or health care staff? \"  Check only one:  [x] 0. No  [] 1. Yes  [] 9. Unable to determine    Transportation  \"Has lack of transportation kept you from medical appointments, meetings, work, or from getting things needed for daily living? \"Check all that apply:  [] A.  Yes, it has kept me from medical appointments or from getting my medications  [] B.  Yes, it has kept me from non-medical meetings, appointments, work, or from getting things that I need  [x] C.  No  [] X. Patient unable to respond  [] Y. Patient declines to respond    Hearing  Ability to hear (with hearing aid or hearing appliances if normally used)  [x]  0. Adequate - no difficulty in normal conversation, social interaction, listening to TV  []  1. Minimal difficulty - difficulty in some environments (e.g. when person speaks softly or setting is noisy)  []  2. Moderate difficulty - speaker has to increase volume and speak distinctly   []  3.   Highly impaired - absence of useful hearing    Vision  Ability to see in adequate light (with glasses or other visual appliances)  [x]  0. Adequate - sees fine detail, such as regular print in newspapers/books  []  1. Impaired - sees large print, but not regular print in newspapers/books  []  2. Moderately impaired - limited vision; not able to see newspaper headlines but can identify objects  []  3. Highly impaired - object identification in question, but eyes appear to follow objects  []  4. Severely impaired - no vision or sees only light, colors, or shapes; eyes do not appear to follow objects    Health Literacy  \"How often do you need to have someone help you when you read instructions, pamphlets, or other written material from your doctor or pharmacy? \"  []  0. Never  []  1. Rarely  []  2. Sometimes  [x]  3. Often  []  4. Always  []  8. Patient unable to respond    BIMS - **Must be completed in the flowsheet at admission prior to proceeding with Delirium Assessment**  [x] BIMS completed in flowsheet at admission    Signs and Symptoms of Delirium  A. Acute Onset Mental Status Change - Is there evidence of an acute change in mental status from the patient's baseline? [x] 0. No  [] 1. Yes    B. Inattention - Did the patient have difficulty focusing attention, for example being easily distractible or having difficulty keeping track of what was being said? [x]  0. Behavior not present  []  1. Behavior continuously present, does not fluctuate  []  2. Behavior present, fluctuates (comes and goes, changes in severity)    C. Disorganized thinking - Was the patient's thinking disorganized or incoherent (rambling or irrelevant conversation, unclear or illogical flow of ideas, or unpredictable switching from subject to subject)? [x]  0. Behavior not present  []  1. Behavior continuously present, does not fluctuate  []  2. Behavior present, fluctuates (comes and goes, changes in severity)    D.   Altered level of consciousness - Did the patient have altered level of consciousness as indicated by any of the following criteria? Vigilant - startled easily to any sound or touch  Lethargic - repeatedly dozed off while being asked questions, but responded to voice or touch  Stuporous - very difficulty to arouse and keep aroused for the interview  Comatose - could not be aroused  [x]  0. Behavior not present  []  1. Behavior continuously present, does not fluctuate  []  2. Behavior present, fluctuates (comes and goes, changes in severity)    Mood    \"Over the last 2 weeks, have you been bothered by any of the following problems?\" 1. Symptom Presence    0 = No  1 = Yes  9 = No Response 2. Symptom Frequency    0 = Never or 1 day  1 = 2-6 days (several days)  2 = 7-11 days (half or more of the days)  3 = 12-14 days (nearly every day)  **Leave blank if 'No Reponse'**      Enter scores in boxes    Column 1 Column 2   Little interest or pleasure in doing things   0 0   Feeling down, depressed, or hopeless   0 0   **If either A or B in column 2 is coded 2 or 3, CONTINUE asking the questions below. If not, END the interview. **     Trouble falling or staying asleep, or sleeping too much       Feeling tired or having little energy       Poor appetite or overeating       Feeling bad about yourself - or that you are a failure or have let yourself or your family down       Trouble concentrating on things, such as reading the newspaper or watching television       Moving or speaking so slowly that other people could have noticed. Or the opposite- being so fidgety or restless that you have been moving around a lot more than usual.       Thoughts that you would be better off dead, or of hurting yourself in some way. Total Severity: Add scores for all frequency responses in column 2 (possible score 0-27, or enter 99 if unable to complete (if symptom frequency (column 2) is blank for 3 or more items).    0     Social Isolation  \"How often do you feel lonely or isolated from those around you? \"  [] 0. Never  [x] 1. Rarely  [] 2. Sometimes  [] 3. Often  [] 4. Always  [] 7. Patient declines to respond  [] 8. Patient unable to respond    Pain Effect on Sleep  \"Over the past 5 days, how much of the time has pain made it hard for you to sleep at night? \"  []  0. Does not apply - I have not had any pain or hurting in the past 5 days  [x]  1. Rarely or not at all  []  2. Occasionally  []  3. Frequently  []  4. Almost constantly  []  8. Unable to answer    **If the patient answers \"0. Does not apply\" to this question, skip the next two \"Pain Effect. Danny Cookluis Carlos \" questions**    Pain Interference with Therapy Activities  \"Over the past 5 days, how often have you limited your participation in rehabilitation therapy sessions due to pain? \"  []  0. Does not apply - I have not received rehabilitation therapy in the past 5 days  [x]  1. Rarely or not at all  []  2. Occasionally  []  3. Frequently  []  4. Almost constantly  []  8. Unable to answer    Pain Interference with Day-to-Day Activities: \"Over the past 5 days, how often have you limited your day-to-day activities (excluding rehabilitation therapy session)? \"  []  1. Rarely or not at all  [x]  2. Occasionally  []  3. Frequently  []  4. Almost constantly  []  8. Unable to answer    Nutritional Approaches  Check all of the following nutritional approaches that apply on admission:  []  A. Parenteral/IV feeding (including IV fluids if needed for hydration, but not as part of dialysis/chemo)  []  B. Feeding tube (e.g., nasogastric or abdominal (PEG))  []  C. Mechanically altered diet - requires change in texture of food or liquids (e.g., pureed food, thickened liquids)  []  D. Therapeutic diet (e.g., low salt, diabetic, low cholesterol)  [x]  Z.   None of the above    High Risk Drug Classes:  Use and Indication    Is taking: Check if the pt is taking any medications by pharmacological classification, not how it is used, in the following classes  Indication noted: If column 1 is checked, check if there is an indication noted for all meds in the drug class Is taking  (check all that apply) Indication noted (check all that apply)   Antipsychotic [] []   Anticoagulant [x] [x]   Antibiotic [] []   Opioid [x] [x]   Antiplatelet [] []   Hypoglycemic (including insulin) [] []   None of the above []     Special Treatments, Procedures, and Programs    Check all of the following treatments, procedures, and programs that apply on admission. On admission (check all that apply)   Cancer Treatments   A1. Chemotherapy []           A2. IV []           A3. Oral []           A10. Other []   B1. Radiation []   Respiratory Therapies   C1. Oxygen Therapy [x]           C2. Continuous (continuously for at least 14 hours per day) []           C3. Intermittent [x]           C4. High-concentration []   D1. Suctioning (Does not include oral suctioning) []           D2. Scheduled []           D3. As needed []   E1. Tracheostomy Care []   F1. Invasive Mechanical Ventilator (ventilator or respirator) []   G1. Non-invasive Mechanical Ventilator []           G2. BiPAP []           G3. CPAP []   Other   H1. IV Medications (Do not include sub Q pumps, flushes, Dextrose 50% or lactated ringers) []           H2. Vasoactive medications []           H3. Antibiotics []           H4. Anticoagulation []           H10. Other []   I1. Transfusions []   J1. Dialysis []           J2. Hemodialysis []           J3. Peritoneal dialysis []   O1. IV access (including a catheter in a vein) []           O2. Peripheral []           O3. Midline []           O4. Central (PICC, tunneled, port) []      None of the above (select if no Cancer, Respiratory, or Other boxes are checked) []     The above items have been reviewed and updated as necessary, and are accurate for the admission assessment period.     Assessing/Reviewing RN: Mare MANZO, RN 1/12/2022 at 1628pm    Assessing/Reviewing RN:

## 2023-01-12 NOTE — DISCHARGE INSTRUCTIONS
We hope your stay on rehab has exceeded your expectations. Once again the entire Acute Rehab Staff at [address] [facility] wish to thank you for allowing us the privilege to care for you. A few days after you are discharged from Rehab, you will receive a survey [employer]) in the mail. This is a nationally distributed survey sent to thousands of rehab patients throughout the nation. It is very important to the staff and Dr. Kobe Watters to receive feedback based on your experience on the Rehab Unit. Thank you, we wish you good health always,         Acute Rehab Team      Hospital Preference:     [facility] [address] Diagnosis/Conditions    _______________________ (free text)    Emergency Contact:    ________________________________________Phone#________________________      Advanced Directives:    Code Status: ?  []  Full Code  ? []  DNR  ? []  [facility]  ? []  [facility] - Arrest    (as of date of discharge:  _________)      Medical POA: ?  []   Yes ______________________________ ? []   No                                       (Name and phone number)                     Living Will:   ?   []   Yes    ?  []   No        Insurance Information:    _______________________ (free text)      Individual Responsible  for the coordination of the discharge/follow up:    ______________________________________________________    Functional Status:    VISUAL DEFICITS:    Yes []  No  []       If yes, assisted device:   Wears Glasses Yes []  No  []  Wears Contacts  Yes []  No  []  Legally Blind Yes []  No  []    HEARING DEFICITS:    Yes []  No  []       If yes, assisted device:   Wears Hearing Aids Yes []  No  []  Pocket Talker  Yes []  No  []       Physical Therapist & Contact #:  OccupationalTherapist & Contact #:  Speech Therapist & Contact #:       Activities of Daily Living:     ADL's - Adaptive Equipment used _____________________ (free text)     []  Independent ?   []  Modified Independent ? []  Supervision                []  Minimal Assistance ? []  Moderate Assistance ? []  Maximal Assistance      Driving Restriction:      []  YES   [] NO     Mobility:     Ambulation: Device _____________________ (free text)     []  Independent ? []  Modified Independent ? []  Supervision                []  Minimal Assistance ? []  Moderate Assistance ? []  Maximal Assistance     Stairs:  Device _____________________ (free text)    Number of stairs: _____________ (free text)    Handrails:    [] Right   [] Left      [] Bilateral   []  Independent ? []  Modified Independent ? []  Supervision                []  Minimal Assistance ? []  Moderate Assistance ? []  Maximal Assistance     Wheelchair:     ? []  Independent ? []  Modified Independent ? []  Supervision                 []  Minimal Assistance  ? []  Moderate Assistance ? []  Maximal Assistance       Current Diet Consistency:?       []  Regular   [] Soft and Bite-Sized  ? [] Minced and Moist     ?[]  Puree     Current Liquid Level:?         [] Thin Liquids     [] Mildly Thick (Nectar) ?     [] Moderately Thick (Honey)    [] Pudding Thick    [] Violetta Noon Water Protocol     Dietary Restrictions:?      []  General Diet   []  Tube Feed, w/ Diet   []  Tube Feed, NPO   []  Carb Control   []  Cardiac   []  Renal    []  Other:

## 2023-01-12 NOTE — PROGRESS NOTES
Physical Therapy    [facility] [address] Department   Phone: [phone]    Physical Therapy    [x] Initial Evaluation            [] Daily Treatment Note         [] Discharge Summary      Patient: Licha Sanderson   : 1935   MRN: [id number]   Date of Service:  2023  Admitting Diagnosis: Nondisplaced fracture of greater trochanter of left femur, initial encounter for closed fracture [facility])  Current Admission Summary: Licha Sanderson is a 81 y.o. male who presents for evaluation of left hip pain status post mechanical fall that occurred approximately 45 minutes prior to arrival.  Patient states that he was outside and his neighbors had seen him fall. He is not sure what caused the fall but denies any preceding symptoms such as dizziness/lightheadedness, chest pain, shortness of breath. No syncope. He denies any his head or any loss of consciousness. No numbness tingling or weakness distally. He is not anticoagulated. No other injuries or complaints at this time  Past Medical History:  has a past medical history of Benign prostatic hypertrophy without urinary obstruction, Bradycardia, COPD (chronic obstructive pulmonary disease) ([facility] [address] Dementia ([facility] [address] Glaucoma, Gout, Hyperlipidemia, Hypertension, Hypoxemia, Macular degeneration disease, Osteoarthritis, Sleep apnea, and Unspecified sleep apnea. Past Surgical History:  has a past surgical history that includes Total hip arthroplasty (2005); joint replacement; TURP; knee surgery; and Colonoscopy. Discharge Recommendations: Licha Sanderson scored a 7/24 on the AM-PAC short mobility form. Current research shows that an AM-PAC score of 17 or less is typically not associated with a discharge to the patient's home setting.  Based on the patient's AM-PAC score and their current functional mobility deficits, it is recommended that the patient have 5-7 sessions per week of Physical Therapy at d/c to increase the patient's independence. At this time, this patient demonstrates complex nursing, medical, and rehabilitative needs, and would benefit from intensive rehabilitation services upon discharge from the Inpatient setting. This patient demonstrates the ability to participate in and benefit from an intensive therapy program with a coordinated interdisciplinary team approach to foster frequent, structured, and documented communication among disciplines, who will work together to establish, prioritize, and achieve treatment goals. Please see assessment section for further patient specific details. If patient discharges prior to next session this note will serve as a discharge summary. Please see below for the latest assessment towards goals. DME Required For Discharge: DME to be determined at next level of care  Precautions/Restrictions: high fall risk, weight bearing  Weight Bearing Restrictions: partial weight bearing - 50 %  [] Right Upper Extremity  [] Left Upper Extremity [] Right Lower Extremity  [x] Left Lower Extremity     Required Braces/Orthotics: no braces required   [] Right  [] Left  Positional Restrictions:No Hip ABduction  - 50% WB LLE with walker; NO active left hip ABDuction  Pre-Admission Information   Lives With: alone                     Type of Home: house  Home Layout: two level, basement with full flight  Home Access:  1 step to enter with handrail. Handrails are located on R side. Bathroom Layout: tub/shower unit, walk in shower--walk-in shower in basement; tub shower on main level. Pt typically uses walk-in shower downstairs.    Bathroom Equipment:  no prior equipment  Toilet Height: standard height  Home Equipment: no prior equipment  Transfer Assistance: Independent without use of device  Ambulation Assistance:Independent without use of device  ADL Assistance: independent with all ADL's  IADL Assistance: independent with homemaking tasks--son fills medications; pt able to take them independently every morning. Active :        [x] Yes                 [] No  Hand Dominance: [] Left                 [x] Right  Current Employment: retired. Occupation: real estate  Hobbies: golf with son every weekend  Recent Falls: no recent falls. Note: Social functional information acquired via phone call with son while in pt room. Daughter Josh Awan in room at [address] [place] [address] and able to confirm. Pt has baseline dementia. Daughter states, Ching Mccarthy is functional but forgetful\". She indicates his confusion has been exacerbated since his admission due to unfamiliar setting and events of hospital course. Examination   Vision:   Vision Gross Assessment: WFL  Hearing:   WFL  Observation:   General Observation:  Pt pulled out his IV upon arrival, nursing notified   Posture: Forward head, rounded shoulders  Sensation:   WFL  ROM:   (B) LE AROM WFL  Strength:   Formal MMT held secondary to pain  Therapist Clinical Decision Making (Complexity): medium complexity  Clinical Presentation: evolving      Subjective  General: Pt supine in bed upon arrival, requesting to get up because he is in pain, he is unaware of his hip fx or that he had a fall. Pt agreeable to PT/OT eval.   Pain: Pain rating taken based on observed faces and behaviors  Pain Interventions: RN notified       Functional Mobility  Bed Mobility  Supine to Sit: maximum assistance  Scooting: maximum assistance  Comments:  Transfers  Sit to stand transfer: minimal assistance  Stand to sit transfer: minimal assistance  Stand pivot transfer: 2 person assistance with min A   Comments: max cues for sequencing and RW navigation. Stand step from EOB>recliner with RW. Pt puts minimal Wbing through L LE due to pain. Pt has difficulty achieving full upright posture   Ambulation  Ambulation not tested on this date secondary to pain. Stair Mobility  Stair mobility not completed on this date.   Comments:  Wheelchair Mobility:  No w/c mobility completed on this date.  Comments:  Balance  Static Sitting Balance: fair: maintains balance at CGA without use of UE support  Dynamic Sitting Balance: fair: maintains balance at CGA without use of UE support  Static Standing Balance: poor: requires mod (A) to maintain balance  Dynamic Standing Balance: poor: requires mod (A) to maintain balance  Comments:    Other Therapeutic Interventions    Functional Outcomes  AM-PAC Inpatient Mobility Raw Score : 7              Cognition  Overall Cognitive Status: Impaired  Arousal/Alterness: appropriate responses to stimuli  Following Commands: follows one step commands with repetition  Attention Span: attends with cues to redirect  Memory: decreased recall of precautions, decreased recall of recent events, decreased short term memory, decreased long term memory  Safety Judgement: decreased awareness of need for assistance  Problem Solving: assistance required to generate solutions, assistance required to identify errors made  Insights: decreased awareness of deficits  Initiation: requires cues for all  Sequencing: requires cues for all  Comments: hx of dementia which family reports is exacerbated in unfamiliar environments   Orientation:    oriented to person, disoriented to place, disoriented to time , and disoriented to situation  Command Following:   accurately follows one step commands    Education  Barriers To Learning: cognition  Patient Education: patient educated on goals, PT role and benefits, plan of care, precautions, weight-bearing education, general safety, functional mobility training, proper use of assistive device/equipment, orientation, family education, transfer training, discharge recommendations  Learning Assessment:  patient will require reinforcement due to cognitive deficits    Assessment  Activity Tolerance: pt limited by significant L LE pain and dementia   Impairments Requiring Therapeutic Intervention: decreased functional mobility, decreased strength, decreased safety awareness, decreased cognition, decreased endurance, decreased balance, increased pain  Prognosis: good  Clinical Assessment: Pt s/p fx of L hip after fall. Pt does not remember how he fell. Pt with prior level of independent functioning and lives alone. At this time, pt requires max A for bed mobility and assist x2 to complete functional transfers. Pt cannot tolerate further ambulation this date due to pain. Pt would continue to benefit from skilled PT to address above deficits, prevent future falls and safely return to PLOF.    Safety Interventions: patient left in chair, chair alarm in place, call light within reach, gait belt, patient at risk for falls, nurse notified, and family/caregiver present    Plan  Frequency: 7 x/week  Current Treatment Recommendations: strengthening, ROM, balance training, functional mobility training, transfer training, gait training, endurance training, safety education, and equipment evaluation/education    Goals  Patient Goals: none stated    Short Term Goals:  Time Frame: upon discharge   Patient will complete bed mobility at minimal assistance   Patient will complete transfers at minimal assistance   Patient will ambulate 15 ft with use of rolling walker at contact guard assistance    Therapy Session Time      Individual Group Co-treatment   Time In     0852   Time Out     0945   Minutes     53     Timed Code Treatment Minutes:   38  Total Treatment Minutes:  53       Electronically Signed By: Mychal Segundo Do [address], [address]

## 2023-01-12 NOTE — PROGRESS NOTES
[facility] Department   Phone: [phone]    Occupational Therapy    [x] Initial Evaluation            [] Daily Treatment Note         [] Discharge Summary      Patient: Abeba Walls   : 1935   MRN: [id number]   Date of Service:  2023    Admitting Diagnosis:  Nondisplaced fracture of greater trochanter of left femur, initial encounter for closed fracture [facility])  Current Admission Summary: Abeba Walls is a 81 y.o. male who presents for evaluation of left hip pain status post mechanical fall that occurred approximately 45 minutes prior to arrival.  Patient states that he was outside and his neighbors had seen him fall. He is not sure what caused the fall but denies any preceding symptoms such as dizziness/lightheadedness, chest pain, shortness of breath. No syncope. He denies any his head or any loss of consciousness. No numbness tingling or weakness distally. He is not anticoagulated. No other injuries or complaints at this time. Past Medical History:  has a past medical history of Benign prostatic hypertrophy without urinary obstruction, Bradycardia, COPD (chronic obstructive pulmonary disease) ([facility] [address] Dementia ([facility] [address] Glaucoma, Gout, Hyperlipidemia, Hypertension, Hypoxemia, Macular degeneration disease, Osteoarthritis, Sleep apnea, and Unspecified sleep apnea. Past Surgical History:  has a past surgical history that includes Total hip arthroplasty (2005); joint replacement; TURP; knee surgery; and Colonoscopy. Discharge Recommendations: Abeba Walls scored a 12 on the AM-PAC ADL Inpatient form. Current research shows that an AM-PAC score of 17 or less is typically not associated with a discharge to the patient's home setting. Based on the patient's AM-PAC score and their current ADL deficits, it is recommended that the patient have 5-7 sessions per week of Occupational Therapy at d/c to increase the patient's independence.   At this time, this patient demonstrates complex nursing, medical, and rehabilitative needs, and would benefit from intensive rehabilitation services upon discharge from the Inpatient setting. This patient demonstrates the ability to participate in and benefit from an intensive therapy program with a coordinated interdisciplinary team approach to foster frequent, structured, and documented communication among disciplines, who will work together to establish, prioritize, and achieve treatment goals. Please see assessment section for further patient specific details. If patient discharges prior to next session this note will serve as a discharge summary. Please see below for the latest assessment towards goals. DME Required For Discharge: DME to be determined at next level of care, DME to be determined pending patient progress, rolling walker    Precautions/Restrictions: high fall risk, weight bearing, ROM restrictions  Weight Bearing Restrictions:  no active Abduction on LLE--50% WB on LLE with RW  [] Right Upper Extremity  [] Left Upper Extremity [] Right Lower Extremity  [x] Left Lower Extremity     Required Braces/Orthotics: no braces required   [] Right  [] Left  Positional Restrictions:no positional restrictions    Pre-Admission Information   Lives With: alone    Type of Home: house  Home Layout: two level, basement with full flight  Home Access:  1 step to enter with handrail. Handrails are located on R side. Bathroom Layout: tub/shower unit, walk in shower--walk-in shower in basement; tub shower on main level. Pt typically uses walk-in shower downstairs.    Bathroom Equipment:  no prior equipment  Toilet Height: standard height  Home Equipment: no prior equipment  Transfer Assistance: Independent without use of device  Ambulation Assistance:Independent without use of device  ADL Assistance: independent with all ADL's  IADL Assistance: independent with homemaking tasks--son fills medications; pt able to take them independently every morning. Active :        [x] Yes  [] No  Hand Dominance: [] Left  [x] Right  Current Employment: retired. Occupation: real estate  Hobbies: golf with son every weekend  Recent Falls: no recent falls. Note: Social functional information acquired via phone call with son while in pt room. Daughter Marysol Santos in room at [address] [place] [address] and able to confirm. Pt has baseline dementia. Daughter states, Sofi Major is functional but forgetful\". She indicates his confusion has been exacerbated since his admission due to unfamiliar setting and events of hospital course. Examination   Vision:   Vision Gross Assessment: WFL  Hearing:   WFL    Posture: Forward head  Sensation:   WFL      ROM:   (B) UE AROM WFL  Strength:   (B) UE strength grossly +4    Therapist Clinical Decision Making (Complexity): high complexity  Clinical Presentation: evolving      Subjective  General: Pt supine in bed upon entry and anxious to get out of bed due to immense LLE pain. Louise Rg He is confused and oriented x1 today. Pt requires reorientation to situation ever few minutes since he forgets why his LLE is painful. Daughter states that  pt is more confused since his admission. Pt is otherwise extremely pleasant and grateful for his care. Pain: 8/10. Location: Left hip  Pain Interventions: RN notified, RN notified of patient request for pain medication, ice applied, and repositioned         Activities of Daily Living  Basic Activities of Daily Living  Lower Extremity Dressing: dependent Comment: threading and pulling breifs knees to hips. Comment: remaining ADL assessments limited due to pain. Pt perseverating on \"getting into the chair\". He denies toileting urges. Will continue to assess. Instrumental Activities of Daily Living  No IADL completed on this date. Functional Mobility  Bed Mobility  Supine to Sit: maximum assistance, pain is primary limiting factor. Pt able to initiate.  Max VC/TC to to maintain AROM limitations (I.e no active Abduction per ortho note)  Scooting: minimal assistance  Comments: HOB elevated and bed rails used. Transfers  Sit to stand transfer:2 person assistance with Min A    Stand to sit transfer: 2 person assistance with min A   Comments: Maximum VC/TC for hand placement and body mechanics during all transitional movement secondary to cognition. Difficulty with carryover noted. Functional Mobility:  Sitting Balance: stand by assistance. Standing Balance: contact guard assistance, minimal assistance, CGA + Min A for ~1-2 min during transfers and ADL completion. .    Comment: No functional mobility completed this session secondary to increased pain.      Other Therapeutic Interventions    Functional Outcomes  AM-PAC Inpatient Daily Activity Raw Score: 12    Cognition  Overall Cognitive Status: Impaired  Arousal/Alterness: appropriate responses to stimuli  Following Commands: follows one step commands with repetition  Attention Span: attends with cues to redirect, difficulty dividing attention  Memory: decreased recall of recent events, decreased short term memory  Safety Judgement: decreased awareness of need for safety  Problem Solving: assistance required to generate solutions, assistance required to implement solutions, assistance required to identify errors made, assistance required to correct errors made  Insights: decreased awareness of deficits  Initiation: requires cues for some  Sequencing: requires cues for some  Orientation:    oriented to person, disoriented to place, disoriented to time , and disoriented to situation  Command Following:   [facility]     Education  Barriers To Learning: cognition  Patient Education: patient educated on goals, OT role and benefits, plan of care, ADL adaptive strategies, IADL safety, transfer training, discharge recommendations  Learning Assessment:  patient will require reinforcement due to cognitive deficits, patient is not an independent learner    Assessment  Activity Tolerance: pt limited by pain  Impairments Requiring Therapeutic Intervention: decreased functional mobility, decreased ADL status, decreased ROM, decreased safety awareness, decreased cognition, decreased endurance, decreased balance, increased pain  Prognosis: good  Clinical Assessment: Pt presenting significantly below his functional baseline with the above deficits associated with fall + subsequent nondisplaced fracture of L greater trochanter. Fracture to be managed non-operatively. Pt is primarily limited by baseline dementia and confusion which has been slightly exacerbated by his admission. Pt is typically independent with ADLs and functional mobility at home. He lives alone and regularly plays golf with his son. At this time, pt is unable to safely care for himself and requires increased assist in all ADL areas. Continued OT indicated in order to maximize safety and independence with all occupational pursuits.    Safety Interventions: patient left in chair, chair alarm in place, patient at risk for falls, telesitter in use, and family/caregiver present    Plan  Frequency: 7 x/week  Current Treatment Recommendations: strengthening, balance training, functional mobility training, transfer training, gait training, stair training, endurance training, patient/caregiver education, ADL/self-care training, IADL training, and safety education    Goals  Patient Goals: Return to home   Short Term Goals:  Time Frame: Discharge  Patient will complete upper body ADL at supervision   Patient will complete lower body ADL at moderate assistance   Patient will complete toileting at 2 person assistance with min A    Patient will complete functional transfers at contact guard assistance   Patient will increase functional standing balance to fair+  for improved ADL completion    Therapy Session Time     Individual Group Co-treatment   Time In    0852   Time Out    0945   Minutes    53       Timed Code Treatment Minutes: 38 Minutes  Total Treatment Minutes:  53 minutes       Electronically Signed By: Shad Smith, OT  Shad Smith OTR/L  [id number]

## 2023-01-12 NOTE — PROGRESS NOTES
4 Eyes Skin Assessment     NAME:  Morgan Harrell  YOB: 1935  MEDICAL RECORD NUMBER:  [id number]    The patient is being assessed for  Admission    I agree that One RN have performed a thorough Head to Toe Skin Assessment on the patient. ALL assessment sites listed below have been assessed. Areas assessed by both nurses:    Head, Face, Ears, Shoulders, Back, Chest, Arms, Elbows, Hands, Sacrum. Buttock, Coccyx, Ischium, and Legs. Feet and Heels        Does the Patient have a Wound?  Other Abrasion L knee due to fall prior to admission, mepilex applied at time of skin assessment       Benjamín Prevention initiated by RN: Yes   Wound Care Orders initiated by RN: No    Pressure Injury (Stage 3,4, Unstageable, DTI, NWPT, and Complex wounds) if present place referral order by RN under : No    New and Established Ostomies, if present place, referral order under : No      Nurse 1 eSignature: Electronically signed by Yvonne Mukherjee RN on 1/12/23 at 5:22 PM EST    **SHARE this note so that the co-signing nurse is able to place an eSignature**    Nurse 2 eSignature: Electronically signed by Domonique Arthur RN on 1/12/23 at 6:51 PM EST

## 2023-01-12 NOTE — PROGRESS NOTES
0940-Patient up in the chair with call light in reach, pt has short term memory loss, needs frequent redirection about situation. Pt denies pain but when moving grimacing and moaning is noted. Pain meds given. Shift assessment completed. The care plan and education has been reviewed and mutually agreed upon with the patient. 1000- Pt loss IV access, Provider aware, order to leave out.

## 2023-01-12 NOTE — H&P
[facility] [address]                     [address] [facility], [address]                              HISTORY AND PHYSICAL    PATIENT NAME: Ke Todd                  :        1935  MED REC NO:   [id number]                          ROOM:       5622  ACCOUNT NO:   [de-identified]                           ADMIT DATE: 2023  PROVIDER:     Frank Schlatter, MD    HISTORY OF PRESENT ILLNESS:  The patient is an 69-year-old white  American gentleman who came to the emergency room following a fall that  happened 45 minutes ago. He is unsure how he fell. He denies loss of  consciousness. Denies any dizziness. No convulsions or incontinence. There is no head injury or ear, nose, or throat bleeding. He does  complain of left hip pain and unable to walk. There is definitely no  apparent limb length discrepancy or external rotation. PAST MEDICAL HISTORY:  Pertinent for senile dementia, benign prostatic  hypertrophy, bradycardia, COPD, glaucoma, chronic gout, hyperlipidemia,  hypertension, macular degeneration, osteoarthritis, obstructive sleep  apnea. PAST SURGICAL HISTORY:  Pertinent for total hip arthroplasty, joint  replacement, TURP, knee surgery, and colonoscopy. FAMILY HISTORY:  Both the parents are . Mother had  atherosclerotic heart disease. Father had sudden death of unexplained  etiology. MEDICATIONS:  The patient is on Aricept, lisinopril, and Lumigan  eyedrops    ALLERGIES:  No known allergies. SOCIAL HISTORY:  The patient is a single,  man who has two  children. He lives by himself with close supervision by his two  children who live close by. He was a nonsmoker, nondrinker. 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 There is  no history of substance abuse. REVIEW OF SYSTEMS:  Review of system is negative for loss of  consciousness. No dizziness. No TIA. No speech disturbance. No  dysphagia. The patient usually maintains a fairly good gait with the  help of a walking stick. No angina pectoris. No orthopnea or  paroxysmal nocturnal dyspnea. No resting shortness of breath. No  abdominal pain. No hematemesis. No melena. No genitourinary complaint  except prostatism symptoms. Does have chronic musculoskeletal pain. Does have obstructive sleep apnea, not clear if he wears any CPAP. The  patient lives by himself. PHYSICAL EXAMINATION:  GENERAL:  Alert, awake, oriented x2, mildly demented and somewhat  disoriented but very pleasant 81-year-old white American man, looking  consistent with his stated age. VITAL SIGNS:  His temperature is 97.1, blood pressure 147/95,  respirations 19, heart rate 67, O2 sat 97% on room air. His body mass  index is 25.80. HEENT:  Unremarkable. Pupils bilaterally equally reacting to light and  accommodation. Arcus senilis. No obvious cataracts. Oral mucosa moist  and pink. NECK:  Neck is supple. Faint carotid bruit. No jugular venous  distention. No lymphadenopathy. No thyromegaly. LUNGS:  Vesicular breath sounds. Fairly clear to auscultation. HEART:  Regular rate and rhythm. S1, S2 without any S3 or S4 gallop. ABDOMEN:  Soft, nontender. Bowel sounds present. EXTREMITIES:  Tenderness in the left hip with restricted range of  motion. There is no distal neurovascular deficit. NEUROLOGIC:  Neurologically, there is no acute sensorimotor focal  deficit. Babinski is absent. Overall, cannot participate very well for  the test of coordination. LABORATORY DATA:  Lab evaluation shows sodium 141, potassium 4.9,  chloride 109, CO2 of 28, BUN 13, creatinine 0.8, anion gap is 7. Blood  glucose is 119. White blood cell count is 6.8, hemoglobin/hematocrit is  13 and 40.2, platelet count is 284. IMAGING DATA:  X-ray of the hip shows no acute abnormality. There is an  acute nondisplaced fracture involving just the left greater trochanter. There is no transcervical or intertrochanteric component to it. Chest  x-ray has not been done. EKG has not been done or not indicated. The  patient is predominantly a nonsurgical case. ASSESSMENT:  Fracture of the greater trochanter left, dementia,  hypertension, relative bradycardia. PLAN:  Plan is get him admitted. Treat him with IV hydration,  parenteral analgesics. The patient does not need any operative  intervention based on what I see and my experience. We will get consult  Orthopedic. We will get PT, OT and skilled nursing facility placement  for a few days, for sure. It appears that patient's main weightbearing  hip articulation appears intact.         Sal Ovalle MD    D: 01/12/2023 8:58:25       T: 01/12/2023 9:01:25     [id number]  Job#: [id number]     Doc#: [id number]    CC:

## 2023-01-13 PROCEDURE — 6370000000 HC RX 637 (ALT 250 FOR IP): Performed by: PHYSICAL MEDICINE & REHABILITATION

## 2023-01-13 PROCEDURE — 97116 GAIT TRAINING THERAPY: CPT

## 2023-01-13 PROCEDURE — 97530 THERAPEUTIC ACTIVITIES: CPT

## 2023-01-13 PROCEDURE — 97165 OT EVAL LOW COMPLEX 30 MIN: CPT

## 2023-01-13 PROCEDURE — 6360000002 HC RX W HCPCS: Performed by: PHYSICAL MEDICINE & REHABILITATION

## 2023-01-13 PROCEDURE — 97535 SELF CARE MNGMENT TRAINING: CPT

## 2023-01-13 PROCEDURE — 97162 PT EVAL MOD COMPLEX 30 MIN: CPT

## 2023-01-13 PROCEDURE — 1280000000 HC REHAB R&B

## 2023-01-13 RX ORDER — DONEPEZIL HYDROCHLORIDE 5 MG/1
10 TABLET, FILM COATED ORAL NIGHTLY
Status: DISCONTINUED | OUTPATIENT
Start: 2023-01-13 | End: 2023-01-26 | Stop reason: HOSPADM

## 2023-01-13 RX ORDER — MEMANTINE HYDROCHLORIDE 5 MG/1
5 TABLET ORAL 2 TIMES DAILY
Status: DISCONTINUED | OUTPATIENT
Start: 2023-01-13 | End: 2023-01-26 | Stop reason: HOSPADM

## 2023-01-13 RX ORDER — QUETIAPINE FUMARATE 25 MG/1
25 TABLET, FILM COATED ORAL 2 TIMES DAILY PRN
Status: DISCONTINUED | OUTPATIENT
Start: 2023-01-13 | End: 2023-01-26 | Stop reason: HOSPADM

## 2023-01-13 RX ORDER — OXYCODONE HYDROCHLORIDE 5 MG/1
5 TABLET ORAL EVERY 4 HOURS PRN
Status: DISCONTINUED | OUTPATIENT
Start: 2023-01-13 | End: 2023-01-23

## 2023-01-13 RX ADMIN — DONEPEZIL HYDROCHLORIDE 10 MG: 5 TABLET, FILM COATED ORAL at 20:23

## 2023-01-13 RX ADMIN — ACETAMINOPHEN 1000 MG: 500 TABLET ORAL at 08:22

## 2023-01-13 RX ADMIN — OXYCODONE 5 MG: 5 TABLET ORAL at 20:22

## 2023-01-13 RX ADMIN — OXYCODONE HYDROCHLORIDE 10 MG: 5 TABLET ORAL at 02:01

## 2023-01-13 RX ADMIN — ACETAMINOPHEN 1000 MG: 500 TABLET ORAL at 20:23

## 2023-01-13 RX ADMIN — MEMANTINE 5 MG: 5 TABLET ORAL at 20:24

## 2023-01-13 RX ADMIN — OXYCODONE HYDROCHLORIDE 10 MG: 5 TABLET ORAL at 13:50

## 2023-01-13 RX ADMIN — QUETIAPINE FUMARATE 25 MG: 25 TABLET ORAL at 13:50

## 2023-01-13 RX ADMIN — TRAZODONE HYDROCHLORIDE 50 MG: 50 TABLET ORAL at 20:23

## 2023-01-13 RX ADMIN — ENOXAPARIN SODIUM 40 MG: 100 INJECTION SUBCUTANEOUS at 20:22

## 2023-01-13 RX ADMIN — OXYCODONE HYDROCHLORIDE 10 MG: 5 TABLET ORAL at 06:37

## 2023-01-13 ASSESSMENT — PAIN DESCRIPTION - ORIENTATION
ORIENTATION: LEFT
ORIENTATION: RIGHT
ORIENTATION: RIGHT;LEFT
ORIENTATION: LEFT
ORIENTATION: LEFT

## 2023-01-13 ASSESSMENT — PAIN - FUNCTIONAL ASSESSMENT
PAIN_FUNCTIONAL_ASSESSMENT: PREVENTS OR INTERFERES SOME ACTIVE ACTIVITIES AND ADLS

## 2023-01-13 ASSESSMENT — PAIN SCALES - GENERAL
PAINLEVEL_OUTOF10: 8
PAINLEVEL_OUTOF10: 6
PAINLEVEL_OUTOF10: 6
PAINLEVEL_OUTOF10: 0
PAINLEVEL_OUTOF10: 7
PAINLEVEL_OUTOF10: 8
PAINLEVEL_OUTOF10: 7
PAINLEVEL_OUTOF10: 3

## 2023-01-13 ASSESSMENT — PAIN DESCRIPTION - ONSET
ONSET: ON-GOING
ONSET: ON-GOING

## 2023-01-13 ASSESSMENT — PAIN DESCRIPTION - LOCATION
LOCATION: HIP

## 2023-01-13 ASSESSMENT — PAIN DESCRIPTION - DESCRIPTORS
DESCRIPTORS: ACHING

## 2023-01-13 ASSESSMENT — PAIN DESCRIPTION - PAIN TYPE
TYPE: ACUTE PAIN
TYPE: ACUTE PAIN

## 2023-01-13 ASSESSMENT — PAIN DESCRIPTION - FREQUENCY
FREQUENCY: CONTINUOUS
FREQUENCY: CONTINUOUS

## 2023-01-13 NOTE — H&P
Patient: Pb Dc  [id number]  Date: 1/13/2023      Chief Complaint: Hip pain    History of Present Illness/Hospital Course:  81-year-old male with a history of BPH, COPD, HTN, HLD, and dementia who was admitted on 1/11 with left hip pain after a fall. Work-up revealed a greater trochanter fracture of the left femur. Ortho evaluated and suggested 50% weightbearing on the left lower extremity. He was evaluated by therapy and suggested to continue in an inpatient setting prior to returning home. He was previously living independently at home with the assistance of family. His evening was complicated by increased confusion requiring the placement of a sitter. He remains pleasantly confused this morning. Prior Level of Function:  Independent with the assistance of family    Current Level of Function:  Total assist     has a past medical history of Benign prostatic hypertrophy without urinary obstruction, Bradycardia, COPD (chronic obstructive pulmonary disease) ([address] Dementia ([address] Glaucoma, Gout, Hyperlipidemia, Hypertension, Hypoxemia, Macular degeneration disease, Osteoarthritis, Sleep apnea, and Unspecified sleep apnea. has a past surgical history that includes Total hip arthroplasty (03/2005); joint replacement; TURP; knee surgery; and Colonoscopy. reports that he has never smoked. He has never used smokeless tobacco. He reports that he does not drink alcohol and does not use drugs. family history includes Heart Disease in his mother; Sudden Death (age of onset: 68) in his father. Allergies: Patient has no known allergies.     Current Facility-Administered Medications   Medication Dose Route Frequency Provider Last Rate Last Admin    QUEtiapine (SEROQUEL) tablet 25 mg  25 mg Oral BID PRN Evangelina Pepe MD   25 mg at 01/13/23 1350    enoxaparin (LOVENOX) injection 40 mg  40 mg SubCUTAneous Daily Evangelina Pepe MD   40 mg at 01/12/23 2049    acetaminophen (TYLENOL) tablet 1,000 mg 1,000 mg Oral TID Faisal Marquez MD   1,000 mg at 01/13/23 9872    diphenhydrAMINE (BENADRYL) tablet 25 mg  25 mg Oral Q6H PRN Faisal Marquez MD        hydrALAZINE (APRESOLINE) tablet 50 mg  50 mg Oral Q8H PRN Faisal Marquez MD        ondansetron (ZOFRAN-ODT) disintegrating tablet 4 mg  4 mg Oral Q8H PRN Faisal Marquez MD        traZODone (DESYREL) tablet 50 mg  50 mg Oral Nightly PRN Faisal Marquez MD   50 mg at 01/12/23 2048    oxyCODONE (ROXICODONE) immediate release tablet 5 mg  5 mg Oral Q4H PRN Faisla Marquez MD        Or    oxyCODONE (ROXICODONE) immediate release tablet 10 mg  10 mg Oral Q4H PRN Faisal Marquez MD   10 mg at 01/13/23 1350       REVIEW OF SYSTEMS:   CONSTITUTIONAL: negative for fevers, chills, diaphoresis, appetite change, night sweats and unexpected weight change. EYES: negative for blurred vision, eye discharge, visual disturbance and icterus. HEENT: negative for hearing loss, tinnitus, ear drainage, sinus pressure, nasal congestion, and epistaxis. RESPIRATORY: Negative for hemoptysis, cough, sputum production. CARDIOVASCULAR: negative for chest pain, palpitations, exertional chest pressure/discomfort, edema, syncope. GASTROINTESTINAL: negative for nausea, vomiting, diarrhea, constipation, blood in stool and abdominal pain. GENITOURINARY: negative for frequency, dysuria, urinary incontinence, decreased urine volume, and hematuria. HEMATOLOGIC/LYMPHATIC: negative for easy bruising, bleeding and lymphadenopathy. ALLERGIC/IMMUNOLOGIC: negative for recurrent infections, angioedema, anaphylaxis and drug reactions. ENDOCRINE: negative for weight changes and diabetic symptoms including polyuria, polydipsia and polyphagia. MUSCULOSKELETAL: positive for pain, joint swelling, decreased range of motion and muscle weakness. NEUROLOGICAL: negative for headaches, slurred speech, unilateral weakness.    PSYCHIATRIC/BEHAVIORAL: negative for hallucinations, behavioral problems, confusion and agitation. All pertinent positives are noted in the HPI. Physical Examination:  Vitals: Patient Vitals for the past 24 hrs:   BP Temp Temp src Pulse Resp SpO2 Height Weight   01/13/23 1350 -- -- -- -- 16 -- -- --   01/13/23 0815 (!) 176/80 98.1 °F (36.7 °C) Oral 81 18 96 % -- --   01/12/23 2049 (!) 153/74 98.7 °F (37.1 °C) Oral 69 16 95 % -- --   01/12/23 1714 -- -- -- -- 17 -- -- --   01/12/23 1536 (!) 166/80 98 °F (36.7 °C) Oral 65 18 98 % 5' 11\" (1.803 m) 209 lb 9.6 oz (95.1 kg)     Psych: Stable mood, normal affect   Const: No distress  Eyes: Conjunctiva noninjected, no icterus noted; pupils equal, round. HENT: Atraumatic, normocephalic; Oral mucosa moist  Neck: Trachea midline, neck supple. No thyromegaly noted. CV: Regular rate and rhythm, no murmur rub or gallop noted  Resp: Lungs clear to auscultation bilaterally, no rales wheezes or ronchi, no retractions. Respirations unlabored. GI: Soft, nontender, nondistended. Normal bowel sounds. No palpable masses. Neuro: Alert, oriented x1 but significantly confused on location, appropriate. No cranial nerve deficits appreciated. Sensation intact to light touch. Motor examination reveals normal strength in BUE/RLE LLE deferred. Skin: Normal temperature and turgor  MSK: No joint abnormalities noted. Ext: No significant edema appreciated. No varicosities.     Lab Results   Component Value Date    WBC 6.2 01/12/2023    HGB 11.8 (L) 01/12/2023    HCT 35.6 (L) 01/12/2023    MCV 92.4 01/12/2023     01/12/2023     Lab Results   Component Value Date    INR 1.14 02/24/2012    PROTIME 12.5 02/24/2012     Lab Results   Component Value Date    CREATININE 0.8 01/12/2023    BUN 13 01/12/2023     01/12/2023    K 4.9 01/12/2023     01/12/2023    CO2 28 01/12/2023     Lab Results   Component Value Date    ALT 8 (L) 02/15/2022    AST 7 (L) 02/15/2022    ALKPHOS 90 02/15/2022    BILITOT 0.3 02/15/2022       Most recent imaging studies revealed   EXAMINATION:   ONE XRAY VIEW OF THE PELVIS AND TWO XRAY VIEWS LEFT HIP       1/11/2023 1:23 pm       COMPARISON:   None. HISTORY:   ORDERING SYSTEM PROVIDED HISTORY: fall, injury   TECHNOLOGIST PROVIDED HISTORY:   Reason for exam:->fall, injury   Reason for Exam: fall, injury       FINDINGS:   There is no acute fracture or dislocation. The bones are demineralized. Status post bilateral hip arthroplasty in anatomic alignment. There is no   periprostatic fracture or loosening. Degenerative changes involve the lumbar   spine. There are no bony destructive lesions. Vascular calcifications are   noted. Impression   1. No acute abnormality. The above laboratory data have been reviewed. The above imaging data have been reviewed. The above medical testing have been reviewed. Body mass index is 29.23 kg/m². Barriers to Discharge: Confusion, decreased safety, ADLs, pain    Disposition: Home    Prognosis: Fair    Rehabilitation goals: To return patient to home setting at their prior level of function. POST ADMISSION PHYSICIAN EVALUATION  The patient has agreed to being admitted to our comprehensive inpatient  rehabilitation facility consisting of at least 180 minutes of therapy a day,  5 out of 7 days a week. The patient/family has a good understanding of our discharge process. The  patient has potential to make improvement and is in need of at least two of  the following multidisciplinary therapies including but not limited to  physical, occupational, respiratory, and speech, nutritional services, wound care, and prosthetics and orthotics. Given the patients complex condition  and risk of further medical complications, rehabilitation services cannot be  safely provided at a lower level of care such as a skilled nursing facility.   I have compared the patients medical and functional status at the time of the  preadmission screening and the same on this date, and there are no significant changes except as documented below in the assessment and plan. By signing this document, I acknowledge that I have personally performed a  full physical examination on this patient within 24 hours of admission to  this inpatient rehabilitation facility and have determined the patient to be  able to tolerate the above course of treatment at an intensive level for a  reasonable period of time. I will be completing a detailed individualized  Plan of Care for this patient by day four of the patients stay based upon the  Preadmission Screen, this Post-Admission Evaluation, and the therapy  evaluations. Assessment and Plan:  Left greater trochanter femur fracture: 50% WBAT in LLE. PT/OT. Pain control. Dementia: resume namenda and aricept per home regimen. Seroquel 25 as needed    HTN: hold lisinopril 10 held. Bowels: Per protocol  Bladder: Per protocol   Sleep: Trazodone provided prn. Pain: tylenol scheduled, kelly PRN - 10 -> 5  DVT PPx: lovenox   ELOS: 10-14    Carolina Sultana MD 1/13/2023, 2:27 PM     * This document was created using dictation software. While all precautions were taken to ensure accuracy, errors may have occurred. Please disregard any typographical errors.

## 2023-01-13 NOTE — PLAN OF CARE
Problem: Discharge Planning  Goal: Discharge to home or other facility with appropriate resources  Outcome: Progressing     Problem: Pain  Goal: Verbalizes/displays adequate comfort level or baseline comfort level  Outcome: Progressing     Problem: Skin/Tissue Integrity  Goal: Absence of new skin breakdown  Description: 1. Monitor for areas of redness and/or skin breakdown  2. Assess vascular access sites hourly  3. Every 4-6 hours minimum:  Change oxygen saturation probe site  4. Every 4-6 hours:  If on nasal continuous positive airway pressure, respiratory therapy assess nares and determine need for appliance change or resting period.   Outcome: Progressing     Problem: Safety - Adult  Goal: Free from fall injury  1/13/2023 1014 by Pauline Florian RN  Outcome: Progressing  1/12/2023 2306 by Justen RN  Outcome: Progressing  Flowsheets (Taken 1/12/2023 1558 by Sharyle Living, RN)  Free From Fall Injury: Based on caregiver fall risk screen, instruct family/caregiver to ask for assistance with transferring infant if caregiver noted to have fall risk factors     Problem: ABCDS Injury Assessment  Goal: Absence of physical injury  Outcome: Progressing

## 2023-01-13 NOTE — PROGRESS NOTES
PM assessment complete. VSS. Medications given per MAR. Fall precautions in place, hourly rounding, call light and belongings in reach, bed in lowest position, wheels locked in place, side rails up x 2, walkways free of clutter. Sitter with patient .

## 2023-01-13 NOTE — PROGRESS NOTES
[facility] [address]   Phone: [phone]    Occupational Therapy    [x] Initial Evaluation            [x] Daily Treatment Note         [] Discharge Summary      Patient: Oanh Ventura   : 1935   MRN: [id number]   Date of Service:  2023    Admitting Diagnosis:  Closed fracture of left femur, sequela  Current Admission Summary:   41-year-old male with a history of BPH, COPD, HTN, HLD, and dementia who was admitted on  with left hip pain after a fall. Work-up revealed a greater trochanter fracture of the left femur. Ortho evaluated and suggested 50% weightbearing on the left lower extremity. He was evaluated by therapy and suggested to continue in an inpatient setting prior to returning home. He was previously living independently at home with the assistance of family. His evening was complicated by increased confusion requiring the placement of a sitter. He remains pleasantly confused this morning. Past Medical History:  has a past medical history of Benign prostatic hypertrophy without urinary obstruction, Bradycardia, COPD (chronic obstructive pulmonary disease) ([address] Dementia ([address] Glaucoma, Gout, Hyperlipidemia, Hypertension, Hypoxemia, Macular degeneration disease, Osteoarthritis, Sleep apnea, and Unspecified sleep apnea. Past Surgical History:  has a past surgical history that includes Total hip arthroplasty (2005); joint replacement; TURP; knee surgery; and Colonoscopy.     Discharge Recommendations: Home with HHOT and assistance for IADLs     DME Required For Discharge: DME to be determined pending patient progress, rolling walker, shower chair with back    Precautions/Restrictions: high fall risk, weight bearing, ROM restrictions  Weight Bearing Restrictions: partial weight bearing - 50 %  [] Right Upper Extremity  [] Left Upper Extremity [] Right Lower Extremity  [x] Left Lower Extremity     Required Braces/Orthotics: no braces required   [] Right  [] Left  Positional Restrictions: no active hip abduction     Pre-Admission Information   Lives With: alone                     Type of Home: house  Home Layout: two level, basement with full flight  Home Access:  1 step to enter with handrail. Handrails are located on R side. Bathroom Layout: tub/shower unit, walk in shower--walk-in shower in basement; tub shower on main level. Pt typically uses walk-in shower downstairs. Bathroom Equipment:  no prior equipment  Toilet Height: standard height  Home Equipment: no prior equipment  Transfer Assistance: Independent without use of device  Ambulation Assistance:Independent without use of device  ADL Assistance: independent with all ADL's  IADL Assistance: independent with homemaking tasks--son fills medications; pt able to take them independently every morning. Active :        [x] Yes                 [] No  Hand Dominance: [] Left                 [x] Right  Current Employment: retired. Occupation: real estate  Hobbies: golf with son every weekend  Recent Falls: no recent falls. Note: Social functional information acquired from acute evaluation on 1/12. Pt has baseline dementia. Daughter states, Christelle Later is functional but forgetful\". She indicates his confusion has been exacerbated since his admission due to unfamiliar setting and events of hospital course. Examination   Vision:   Vision Corrective Device: wears glasses for reading  Hearing:   [employer]  Posture: Forward flexed   Sensation:   denies numbness and tingling  Proprioception:    WFL  Tone:   Normotonic  Coordination Testing:   WFL    ROM:   (B) UE AROM WFL  Strength:   (B) UE strength grossly WFL    Therapist Clinical Decision Making (Complexity): low complexity  Clinical Presentation: evolving      Subjective  General: Patient in bed upon arrival, agreeable to OT/PT evaluation. Patient intermittently confused and asking where his belongings were. Pain: 5/10.   Location: L hip  Pain Interventions: patient denies pain interventions, ice applied, and repositioned         Activities of Daily Living  Basic Activities of Daily Living  Grooming: setup assistance stand by assistance  Grooming Comments: patient applied deodorant seated on [facility] in shower. Patient completed oral hygiene seated in   Upper Extremity Bathing: minimal assistance  Lower Extremity Bathing: maximum assistance   Bathing Comments: patient completed shower seated on BSC in walk in shower. Patient able to use HH shower head to wash hair and UB. Haydee for UB, maxA for rear rupali area and lower legs. Upper Extremity Dressing: minimal assistance  Lower Extremity Dressing: dependent  Dressing Comments: Haydee for donning shirt. Assist of 2 for LB dressing. maxA for threading and modA for balance and maxA for managing over hips. Total A for donning socks   General Comments: Patient declining toileting. Instrumental Activities of Daily Living  No IADL completed on this date. Functional Mobility  Bed Mobility  Supine to Sit: minimal assistance  Rolling Left: not attempted due to pain   Rolling Right: not attempted due to pain   Scooting: minimal assistance  Comments:  Transfers  Sit to stand transfer:2 person assistance with modA of 2    Stand to sit transfer: 2 person assistance with modA of 2    Stand pivot transfer: 2 person assistance with mod A of 2    Stand step transfer: moderate assistance  Shower transfer: 2 person assistance with modA of 1 and Haydee of 1    Shower transfer equipment: bedside commode, grab bars  Shower transfer comments: patient walked into walk in shower with RW and modA. Patient transferred from [facility] in shower to  with mod A of 1 and Haydee of 1 and grab bars   Comments: patient completed transfers to/from [facility],  and [facility]. Education and cueing provided for hand placement. Functional Mobility:  Sitting Balance: stand by assistance.     Sitting Balance Comment: seated EO prior to transfer and on [facility] for shower    Standing Balance: minimal assistance, moderate assistance. Standing Balance Comment: with RW or grab bars   Functional Mobility: .  moderate assistance, wc follow   Functional Mobility Activity: 10 + 15 + 5 ft   Functional Mobility Device Use: rolling walker  Functional Mobility Comment: patient completed mobility into bathroom and 2 short bouts of mobility in hallway. Other Therapeutic Interventions      Second Session:  Patient in recliner upon arrival, agreeable to OT/PT session. Reporting 5/10 pain in L hip but able to participate in more mobility during this session. Patient completed transfer from recliner to [address] [place] [address] with Haydee and completed mobility into bathroom with RW and Haydee. Patient completed clothing mgmt and toilet transfer with [facility]. Cues for using grab bar. Patient incontinent of urine and voided urine seated on toilet. Patient required maxA for doffing/donning pullup and pants. Patient required modA for stand to RW from toilet due to posterior lean. Patient completed ~10 ft of mobility to wc with Haydee. Patient completed wc mobility into gym, ~70 ft with BUE to propel. Patient participated in transfer training from various heights on mat table. Patient required Haydee for transfers to/from RW. Patient participating in standing ball taps and ball toss with CGA. Patient able to use RW for balance during ball taps progressing to using BUE for catching and tossing. No LOB noted. Patient tolerated ~3 mins of standing. Patient left in recliner at end of session with chair alarm on, call button in reach, sitter present, and all needs met.         Cognition  Overall Cognitive Status: Impaired  Arousal/Alterness: appropriate responses to stimuli  Following Commands: follows one step commands consistently  Attention Span: difficulty dividing attention  Memory: decreased recall of precautions, decreased recall of recent events, decreased short term memory  Safety Judgement: decreased awareness of need for assistance, decreased awareness of need for safety  Problem Solving: assistance required to generate solutions, assistance required to implement solutions, decreased awareness of errors  Insights: decreased awareness of deficits  Initiation: requires cues for some  Sequencing: requires cues for some  Comments: baseline dementia but increased confusion during hospital stay   Orientation:    oriented to person, oriented to place, disoriented to time , and disoriented to situation  Command Following:   accurately follows one step commands     Education  Barriers To Learning: cognition  Patient Education: patient educated on goals, OT role and benefits, plan of care, precautions, ADL adaptive strategies, weight-bearing education, orientation, disease specific education, transfer training, discharge recommendations  Learning Assessment:  patient will require reinforcement due to cognitive deficits    Assessment  Activity Tolerance: patient tolerated well. Limited by pain   Impairments Requiring Therapeutic Intervention: decreased functional mobility, decreased ADL status, decreased strength, decreased safety awareness, decreased cognition, decreased endurance, decreased balance, decreased IADL, increased pain, decreased posture  Prognosis: good  Clinical Assessment: Patient presenting below baseline function secondary to left femur fracture. Patient typically independent with ADLs and mobility with no device. Patient requiring modA of 2 for transfers this date and max-totalA for ADLs. Patient limited by pain and confusion but pleasant and agreeable to participate with therapy. Patient able to progress during seconda session to transfers and mobility with RW and assist of 1. Patient will benefit from continued OT services to address above deficits and maximize safety and independence in ADLs and mobility in order to return home.    Safety Interventions: patient left in chair, chair alarm in place, call light within reach, patient at risk for falls, and sitter present    Plan  Frequency: 5 x/week, 90 min/day  Current Treatment Recommendations: strengthening, balance training, functional mobility training, transfer training, endurance training, patient/caregiver education, ADL/self-care training, IADL training, cognitive reorientation, safety education, and equipment evaluation/education    Goals  Patient Goals: to return to [facility]   Short Term Goals:  Time Frame: 2 weeks   Patient will complete upper body ADL at [facility]   Patient will complete lower body ADL at [facility]   Patient will complete toileting at modified independent   Patient will complete functional transfers at [facility]   Patient to gather and transport IADL items at [facility]     Therapy Session Time     Individual Group Co-treatment   Time In    0830   Time Out    0930   Minutes    60     Second Session Therapy Time:   Individual Concurrent Group Co-treatment   Time In       1035   Time Out       1105   Minutes       30       Timed Code Treatment Minutes:  Timed Code Treatment Minutes: 45 Minutes + 30 minutes   Total Treatment Minutes:  90 minutes        Electronically Signed By: Heather Barnes, [employer] OTR/L [id number]

## 2023-01-13 NOTE — PROGRESS NOTES
Admission Period/Goal QM Codes for [facility]. QM Admit Code Goal Code   Eating     Oral Hygiene     Toileting Hygiene     Shower/Bathing     UB Dressing     LB Dressing     Putting on/off Footwear     Rolling Left and Right     Sit To Lying     Lying to Sitting on Bedside     Sit to Stand     Chair/Bed to Chair Transfer     Toilet Transfers     Car Transfers     Walk 10 Feet     Walk 50 Feet with Two Turns     Walk 150 Feet     Walk 10 Feet on Uneven Surfaces     1 Step (Curb)     4 Steps     12 Steps     Picking up Object from [facility] [address] 50 Feet with 2 Turns     Type     Wheel 150 Feet     Type         The above codes were determined by the treatment team to be the patient's accurate admission assessment codes based on assessment performed soon after the patient's admission and prior to the benefit of services provided by staff, or if appropriate, the patient's usual performance at admission.     OT:       PT:       RN:       ST:       :

## 2023-01-13 NOTE — PROGRESS NOTES
Pt restless attempted to get out of bed without using call light. Pt provided emotional support, toileted and repositioned in bed. Call light in reach. Camera in use for safety. Pt continually asking where his car is and when he is leaving and where the living room was. Provider notified of restlessness and attempting to get out of bed. Sitter ordered for pt and safety due to recent fall.      Ricardo CRAIGN, RN   [phone]

## 2023-01-13 NOTE — PROGRESS NOTES
Nutrition Note    RECOMMENDATIONS  No nutrition rec's @ this time. NUTRITION ASSESSMENT   Pt receives a regular diet with po intake % of meals. No wt loss per hx; skin is intact. Pt is at low risk for nutrition compromise @ this time. Will con't to monitor for further needs. Nutrition Related Findings: Labs reviewed; LBM 1/10; no edema noted  Wounds: None  Nutrition Education:  Education not indicated   Nutrition Goals: PO intake 75% or greater     MALNUTRITION ASSESSMENT   Acute Illness  Malnutrition Status: No malnutrition    NUTRITION DIAGNOSIS   No nutrition diagnosis at this time     CURRENT NUTRITION THERAPIES  ADULT DIET; Regular     PO Intake: %   PO Supplement Intake:None Ordered    ANTHROPOMETRICS  Current Height: 5' 11\" (180.3 cm)  Current Weight: 209 lb 9.6 oz (95.1 kg) (bed zerod 2 pillows, 1 sheet, 1 fitted sheet, 1 kaitlyn)    Ideal Body Weight (IBW): 172 lbs  (78 kg)      BMI: 29.2    The patient will be monitored per nutrition standards of care. Consult dietitian if additional nutrition interventions are needed prior to RD reassessment.      Soledad Busch, NAVNEET, LD    Contact: [phone]

## 2023-01-13 NOTE — PLAN OF CARE
Problem: Safety - Adult  Goal: Free from fall injury  Outcome: Progressing  Flowsheets (Taken 1/12/2023 [id number] by Mohit Cruz RN)  Free From Fall Injury: Based on caregiver fall risk screen, instruct family/caregiver to ask for assistance with transferring infant if caregiver noted to have fall risk factors

## 2023-01-13 NOTE — CARE COORDINATION
SW/CM attempted to complete Initial IMM with patient and complete SW Assessment. Patient not able to complete. Patient's RN stating that patient was given medications for sleep. SW/CM attempted to contact patient's daughter. SW/CM got a VM and had to leave a message. SW will follow.     Electronically signed by SONDRA Champion on 1/13/2023 at 5:03 PM

## 2023-01-13 NOTE — PROGRESS NOTES
[facility] [address] Department   Phone: [phone]    Physical Therapy    [x] Initial Evaluation            [] Daily Treatment Note         [] Discharge Summary      Patient: Amador Irving   : 1935   MRN: [id number]   Date of Service:  2023  Admitting Diagnosis: Closed fracture of left femur, sequela  Current Admission Summary: 28-year-old male with a history of BPH, COPD, HTN, HLD, and dementia who was admitted on  with left hip pain after a fall. Work-up revealed a greater trochanter fracture of the left femur. Ortho evaluated and suggested 50% weightbearing on the left lower extremity. He was evaluated by therapy and suggested to continue in an inpatient setting prior to returning home. He was previously living independently at home with the assistance of family. His evening was complicated by increased confusion requiring the placement of a sitter. He remains pleasantly confused this morning. Past Medical History:  has a past medical history of Benign prostatic hypertrophy without urinary obstruction, Bradycardia, COPD (chronic obstructive pulmonary disease) ([address] Dementia ([facility] [address] Glaucoma, Gout, Hyperlipidemia, Hypertension, Hypoxemia, Macular degeneration disease, Osteoarthritis, Sleep apnea, and Unspecified sleep apnea. Past Surgical History:  has a past surgical history that includes Total hip arthroplasty (2005); joint replacement; TURP; knee surgery; and Colonoscopy.   Discharge Recommendations: Home with Home Health Physical Therapy, Supervision level pending cognitive progress  DME Required For Discharge: rolling walker  Precautions/Restrictions: high fall risk, weight bearing, ROM restrictions  Weight Bearing Restrictions: partial weight bearing - 50 %  [] Right Upper Extremity  [] Left Upper Extremity [] Right Lower Extremity  [x] Left Lower Extremity     Required Braces/Orthotics: no braces required   [] Right  [] Left  Positional Restrictions:No Active Hip ABduction    Pre-Admission Information    ** Pre-admission information gained from family members in acute care setting **  Lives With: alone                     Type of Home: house  Home Layout: two level, basement with full flight  Home Access:  1 step to enter with handrail. Handrails are located on R side. Bathroom Layout: tub/shower unit, walk in shower--walk-in shower in basement; tub shower on main level. Pt typically uses walk-in shower downstairs. Bathroom Equipment:  no prior equipment  Toilet Height: standard height  Home Equipment: no prior equipment  Transfer Assistance: Independent without use of device  Ambulation Assistance:Independent without use of device  ADL Assistance: independent with all ADL's  IADL Assistance: independent with homemaking tasks--son fills medications; pt able to take them independently every morning. Active :        [x] Yes                 [] No  Hand Dominance: [] Left                 [x] Right  Current Employment: retired. Occupation: real estate  Hobbies: golf with son every weekend  Recent Falls: no recent falls. Examination   Vision:   Vision Corrective Device: wears glasses for reading  Hearing:   [facility]  Observation:   General Observation: Forward flexed posturing that worsens with fatigue  Posture:   Rounded shoulders  Sensation:   WFL and denies numbness and tingling  Coordination Testing:   WFL    ROM:   (L) Hip: significantly limited by pain     (R) Hip: able to lift a few degrees but limited by (L) hip pain  (B) Knee AROM WFL  (B) Ankle AROM WFL  Strength:   Formal MMT held secondary to pain and cognitive deficits  Therapist Clinical Decision Making (Complexity): medium complexity  Clinical Presentation: evolving      Subjective  General: Patient supine in bed upon arrival with [facility] elevated, agreeable to therapy. Pt disoriented to situation, requires consistent education throughout session regarding hip fracture.   Very limited short term carryover within session. Pain: 5/10. Location: (L) hip  Pain Interventions: pain medication in place prior to arrival       Functional Mobility  Bed Mobility  Supine to Sit: minimal assistance, at (L) LE  Rolling Left: unable to assess secondary to pain  Rolling Right: unable to assess secondary to pain  Scooting: minimal assistance, at (L) LE  Comments:  Transfers  Sit to stand transfer: 2 person assistance with mod A of 2 to RW, completes at mod A of 1 + min A of 1 from shower surface with fixed bar support   Stand to sit transfer: 2 person assistance with mod A of 2   Stand pivot transfer: 2 person assistance with mod A of 1 + min A of 1: completed shower => w/c with fixed bar support   Stand step transfer: 2 person assistance with use of RW: completed w/c => recliner with RW requiring mod A of 2 to achieve standing, mod A of 1 for completion of turn. Comments: All performed with use of RW. VC/TV provided for anterior weight shift with standing. Ambulation  Surface:level surface  Assistive Device: rolling walker  Assistance: dependent assistance, - mod A of 1 for ambulation with close w/c follow by second therapist  Distance: 10' + 15' + 5'  Gait Mechanics: Step-to pattern with significant forward flexed posture, increased UE reliance, and small step length  Comments:  Frequent VC/TC for promoting upright posture   Stair Mobility  Stair mobility not completed on this date. Comments: Unsafe to attempt secondary to pain and restricted WB status/cognition.   Wheelchair Mobility: completed during second session  Chair: manual  Surface: level surface  Method: (R) UE and (L) UE  Distance: 70 ft  Assistance: modified independent  Comments:  Balance  Static Sitting Balance: fair (+): maintains balance at SBA/supervision without use of UE support  Dynamic Sitting Balance: fair (+): maintains balance at SBA/supervision without use of UE support  Static Standing Balance: fair: maintains balance at CGA without use of UE support  Dynamic Standing Balance: fair: maintains balance at CGA without use of UE support  Patient completes object retrieval from floor in standing position at not attempted, secondary to safety concerns   Comments:    Other Therapeutic Interventions   First session:   See above functional mobility. In addition shower completed with OT team member to maximize patient performance and maintain patient safety. Pt ambulates bed => shower surface as documented above. Pt maintains static stance at mod (A) of 1 during dependent LB clothing management. Pt requires SBA to maintain dynamic sitting balance during shower completion in addition to OT assist with ADL task completion. Stand pivot transfer completed with fixed bar support to w/c at mod (A) of 1 + min (A) of 1. Second session:   Patient seated in recliner upon entry, agreeable to therapy. Reporting 5/10 pain in the (L) hip. Patient performed initial sit to stand from recliner at min A x 1. Ambulated 15 ft to restroom at mod A of 1 with use of RW. VC provided for upright posture. Toilet transfer performed at mod A x 1 with use of grab bar. See OT note for further details. W/c mobility performed at modified independent level for 70' with (B) UE to propel. Multiple sit to stands performed from elevated therapy mat starting at 24 in and progressing to 22 in. Patient performed all STS from mat at min A of 1 with VC for anterior weight shift. Standing balance activities performed EOM at CGA with RW: ball tap reaching across and outside [place] with one UE support progressing to no UE, and ball toss. Patient ambulated 5' and performed turn with RW to his recliner at mod A x 1. Patient returned to recliner with call light in reach, chair alarm in place, and sitter present.      Functional Outcomes                 Cognition  Overall Cognitive Status: Impaired  Arousal/Alterness: appropriate responses to stimuli  Following Commands: follows one step commands consistently  Attention Span: attends with cues to redirect, difficulty dividing attention  Memory: decreased recall of precautions, decreased short term memory, decreased long term memory  Safety Judgement: decreased awareness of need for assistance, decreased awareness of need for safety  Problem Solving: assistance required to generate solutions, assistance required to implement solutions, decreased awareness of errors  Insights: decreased awareness of deficits  Initiation: requires cues for some  Sequencing: requires cues for some  Comments: Increased impulsivity with frequent VC for reorientation to surroundings. Patient carryover within conversation/directions less than 1 minute before requiring repetition including recall of hip fracture. Orientation:    oriented to person, oriented to place, oriented to situation, and disoriented to time   Command Following:   impaired    Education  Barriers To Learning: cognition  Patient Education: patient educated on goals, PT role and benefits, plan of care, precautions, weight-bearing education, general safety, functional mobility training, proper use of assistive device/equipment, orientation, transfer training  Learning Assessment:  patient will require reinforcement due to cognitive deficits    Assessment  Activity Tolerance: Limited by pain but appears to recover well with rest  Impairments Requiring Therapeutic Intervention: decreased functional mobility, decreased ADL status, decreased ROM, decreased strength, decreased safety awareness, decreased cognition, decreased endurance, decreased balance, increased pain, decreased posture  Prognosis: fair  Clinical Assessment: Patient is a 81year old male who presents with increased pain and impaired functional mobility secondary to a greater trochanter fracture of the left femur from a fall that occurred on 1/11/2023.  Prior to admission, patient was independent with all mobility and ADLs without use of an AD. Patient is limited by cognitive deficits leading to impaired safety awareness and risk of falls. He would benefit from skilled therapy services in order to promote functional independence with all mobility, reduce pain, and decrease risk of falls for return to home. Safety Interventions: patient left in chair, chair alarm in place, call light within reach, gait belt, and sitter present    Plan  Frequency: 5 x/week, 90 min/day  Current Treatment Recommendations: strengthening, ROM, balance training, functional mobility training, transfer training, gait training, stair training, endurance training, neuromuscular re-education, modalities, patient/caregiver education, ADL/self-care training, cognitive reorientation, pain management, and safety education    Goals  Patient Goals: To return home   Short Term Goals:  Time Frame: 2 weeks  Patient will complete bed mobility at modified independent   Patient will complete transfers at [facility]   Patient will ambulate 150 ft with use of rolling walker at modified independent  Patient will ascend/descend 4 stairs with (R) ascending handrail at modified independent  Patient will complete car transfer at [facility]    Therapy Session Time  First session    Individual Group Co-treatment   Time In   0830   Time Out   0930   Minutes   60     Second session   Individual Group Co-treatment   Time In      1035   Time Out      1105   Minutes      30     Timed Code Treatment Minutes:  45 + 30  minutes  Total Treatment Minutes:  90 minutes      Electronically Signed By:   WENDY Carrasco  Therapist observed and directed the patient's plan of care.   Co-signed and supervised by: Akila Olson PT, DPT - [id number]
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
## 2023-01-14 PROCEDURE — 6370000000 HC RX 637 (ALT 250 FOR IP): Performed by: PHYSICAL MEDICINE & REHABILITATION

## 2023-01-14 PROCEDURE — 51798 US URINE CAPACITY MEASURE: CPT

## 2023-01-14 PROCEDURE — 6360000002 HC RX W HCPCS: Performed by: PHYSICAL MEDICINE & REHABILITATION

## 2023-01-14 PROCEDURE — 94760 N-INVAS EAR/PLS OXIMETRY 1: CPT

## 2023-01-14 PROCEDURE — 97530 THERAPEUTIC ACTIVITIES: CPT

## 2023-01-14 PROCEDURE — 97535 SELF CARE MNGMENT TRAINING: CPT

## 2023-01-14 PROCEDURE — 97116 GAIT TRAINING THERAPY: CPT

## 2023-01-14 PROCEDURE — 1280000000 HC REHAB R&B

## 2023-01-14 RX ORDER — DORZOLAMIDE HCL 20 MG/ML
1 SOLUTION/ DROPS OPHTHALMIC 2 TIMES DAILY
Status: DISCONTINUED | OUTPATIENT
Start: 2023-01-14 | End: 2023-01-26 | Stop reason: HOSPADM

## 2023-01-14 RX ORDER — DORZOLAMIDE HCL 20 MG/ML
1 SOLUTION/ DROPS OPHTHALMIC 2 TIMES DAILY
COMMUNITY

## 2023-01-14 RX ORDER — SENNA AND DOCUSATE SODIUM 50; 8.6 MG/1; MG/1
2 TABLET, FILM COATED ORAL 2 TIMES DAILY
Status: DISCONTINUED | OUTPATIENT
Start: 2023-01-14 | End: 2023-01-26 | Stop reason: HOSPADM

## 2023-01-14 RX ADMIN — OXYCODONE 5 MG: 5 TABLET ORAL at 18:09

## 2023-01-14 RX ADMIN — TRAZODONE HYDROCHLORIDE 50 MG: 50 TABLET ORAL at 21:27

## 2023-01-14 RX ADMIN — ACETAMINOPHEN 1000 MG: 500 TABLET ORAL at 21:27

## 2023-01-14 RX ADMIN — MEMANTINE 5 MG: 5 TABLET ORAL at 07:40

## 2023-01-14 RX ADMIN — ENOXAPARIN SODIUM 40 MG: 100 INJECTION SUBCUTANEOUS at 21:27

## 2023-01-14 RX ADMIN — STANDARDIZED SENNA CONCENTRATE AND DOCUSATE SODIUM 2 TABLET: 8.6; 5 TABLET ORAL at 21:26

## 2023-01-14 RX ADMIN — OXYCODONE 5 MG: 5 TABLET ORAL at 13:54

## 2023-01-14 RX ADMIN — MEMANTINE 5 MG: 5 TABLET ORAL at 21:31

## 2023-01-14 RX ADMIN — OXYCODONE 5 MG: 5 TABLET ORAL at 09:55

## 2023-01-14 RX ADMIN — DORZOLAMIDE HYDROCHLORIDE 1 DROP: 20 SOLUTION/ DROPS OPHTHALMIC at 21:20

## 2023-01-14 RX ADMIN — ACETAMINOPHEN 1000 MG: 500 TABLET ORAL at 13:53

## 2023-01-14 RX ADMIN — OXYCODONE 5 MG: 5 TABLET ORAL at 05:53

## 2023-01-14 RX ADMIN — DONEPEZIL HYDROCHLORIDE 10 MG: 5 TABLET, FILM COATED ORAL at 21:27

## 2023-01-14 RX ADMIN — STANDARDIZED SENNA CONCENTRATE AND DOCUSATE SODIUM 2 TABLET: 8.6; 5 TABLET ORAL at 10:35

## 2023-01-14 RX ADMIN — ACETAMINOPHEN 1000 MG: 500 TABLET ORAL at 07:40

## 2023-01-14 ASSESSMENT — PAIN DESCRIPTION - LOCATION
LOCATION: LEG
LOCATION: LEG
LOCATION: HIP
LOCATION: HIP
LOCATION: LEG

## 2023-01-14 ASSESSMENT — PAIN DESCRIPTION - ORIENTATION
ORIENTATION: LEFT

## 2023-01-14 ASSESSMENT — PAIN DESCRIPTION - DESCRIPTORS
DESCRIPTORS: ACHING;DISCOMFORT
DESCRIPTORS: ACHING
DESCRIPTORS: ACHING;DISCOMFORT
DESCRIPTORS: ACHING;DISCOMFORT;SORE

## 2023-01-14 ASSESSMENT — PAIN SCALES - GENERAL
PAINLEVEL_OUTOF10: 6
PAINLEVEL_OUTOF10: 8
PAINLEVEL_OUTOF10: 6
PAINLEVEL_OUTOF10: 5
PAINLEVEL_OUTOF10: 0
PAINLEVEL_OUTOF10: 5
PAINLEVEL_OUTOF10: 7
PAINLEVEL_OUTOF10: 6

## 2023-01-14 NOTE — PLAN OF CARE
Problem: Pain  Goal: Verbalizes/displays adequate comfort level or baseline comfort level  1/13/2023 2237 by Terence Alarcon RN  Outcome: Progressing

## 2023-01-14 NOTE — PROGRESS NOTES
[facility] [address] Department   Phone: [phone]    Occupational Therapy    [x] Initial Evaluation            [x] Daily Treatment Note         [] Discharge Summary      Patient: Tay Brumfield   : 1935   MRN: [id number]   Date of Service:  2023    Admitting Diagnosis:  Closed fracture of left femur, sequela  Current Admission Summary:   56-year-old male with a history of BPH, COPD, HTN, HLD, and dementia who was admitted on  with left hip pain after a fall. Work-up revealed a greater trochanter fracture of the left femur. Ortho evaluated and suggested 50% weightbearing on the left lower extremity. He was evaluated by therapy and suggested to continue in an inpatient setting prior to returning home. He was previously living independently at home with the assistance of family. His evening was complicated by increased confusion requiring the placement of a sitter. He remains pleasantly confused this morning. Past Medical History:  has a past medical history of Benign prostatic hypertrophy without urinary obstruction, Bradycardia, COPD (chronic obstructive pulmonary disease) ([address] Dementia ([address] Glaucoma, Gout, Hyperlipidemia, Hypertension, Hypoxemia, Macular degeneration disease, Osteoarthritis, Sleep apnea, and Unspecified sleep apnea. Past Surgical History:  has a past surgical history that includes Total hip arthroplasty (2005); joint replacement; TURP; knee surgery; and Colonoscopy.     Discharge Recommendations: Home with HHOT and assistance for IADLs     DME Required For Discharge: DME to be determined pending patient progress, rolling walker, shower chair with back    Precautions/Restrictions: high fall risk, weight bearing, ROM restrictions  Weight Bearing Restrictions: partial weight bearing - 50 %  [] Right Upper Extremity  [] Left Upper Extremity [] Right Lower Extremity  [x] Left Lower Extremity     Required Braces/Orthotics: no braces required   [] Right  [] Left  Positional Restrictions: no active hip abduction     Pre-Admission Information   Lives With: alone                     Type of Home: house  Home Layout: two level, basement with full flight  Home Access:  1 step to enter with handrail. Handrails are located on R side. Bathroom Layout: tub/shower unit, walk in shower--walk-in shower in basement; tub shower on main level. Pt typically uses walk-in shower downstairs. Bathroom Equipment:  no prior equipment  Toilet Height: standard height  Home Equipment: no prior equipment  Transfer Assistance: Independent without use of device  Ambulation Assistance:Independent without use of device  ADL Assistance: independent with all ADL's  IADL Assistance: independent with homemaking tasks--son fills medications; pt able to take them independently every morning. Active :        [x] Yes                 [] No  Hand Dominance: [] Left                 [x] Right  Current Employment: retired. Occupation: real estate  Hobbies: golf with son every weekend  Recent Falls: no recent falls. Note: Social functional information acquired from acute evaluation on 1/12. Pt has baseline dementia. Daughter states, Camilla Barahona is functional but forgetful\". She indicates his confusion has been exacerbated since his admission due to unfamiliar setting and events of hospital course. Examination   Vision:   Vision Corrective Device: wears glasses for reading  Hearing:   [employer] [place]  Posture: Forward flexed   Sensation:   denies numbness and tingling  Proprioception:    WFL  Tone:   Normotonic  Coordination Testing:   WFL    ROM:   (B) UE AROM WFL  Strength:   (B) UE strength grossly WFL    Therapist Clinical Decision Making (Complexity): low complexity  Clinical Presentation: evolving      Subjective  General: Patient in bed upon arrival, agreeable to OT/PT evaluation. Patient intermittently confused and asking where his belongings were.    Pain: Pain rating taken based on observed faces and behaviors (verbally c/o pain in L hip  Pain Interventions: pain medication in place prior to arrival, ice applied, and repositioned         Activities of Daily Living    Basic Activities of Daily Living  Grooming: setup assistance stand by assistance requires verbal cueing Increased time to complete task  Grooming Comments: Pt completed shaving and oral care seated d/t pain in stance; cues to progress through task d/t dementia and new setting  Lower Extremity Dressing: maximum assistance  Dressing Equipment: none  Dressing Comments: Pt able to assist threading BLE but required assist th fully thread LEs through pant openings; utilized BUE to initially lift RLE then able to actively lift RLE to complete threading w/o use of UEs; Max cues for technique  Toileting: moderate assistance. Toileting Comments: needed occasional assistance to place urinal in stance d/t pain in standing and use of BUE to support balance; attempted urinating several times but refused to use toilet; requesting to use urinal  Instrumental Activities of Daily Living  No IADL completed on this date. Functional Mobility    Bed Mobility  Supine to Sit: minimal assistance  Comments: assist w/ LLE  Transfers  Sit to stand transfer:2 person assistance with min A of 2   Stand to sit transfer: 2 person assistance with min A of 2   Stand step transfer: 2 person assistance with min A of 2   Bed / Chair transfer: 2 person assistance with min A of 2 . Comments: mobility and transfers initially required min A of 2 then pt progressed to min A of 1  Functional Mobility:  Sitting Balance: supervision. Standing Balance: minimal assistance.     Standing Balance Comment: in stance at walker to urinate, transfers, mobility, and grooming  Functional Mobility: .  2 person assistance with min A of 2 initially, progressing to min A of 1   Functional Mobility Activity: to/from bathroom  Functional Mobility Device Use: rolling walker  Functional Mobility Comment: VC for technique and 50% WBing      Other Therapeutic Interventions      Second Session: Pt supine in bed upon arrival, agreeable to session. C/o 10/10 pain in L hip - unable to receive additional pain med but repositioned and allowed rest as needed to tolerate session. Pt required reorientation d/t dementia. Supine>sit w/ min A of LLE. Ambulated 50 feet w/ min A of 1 and RW - limited by pain. Performed stand step transfer W/C<>car w/ min A of 1 - able to lift LLE w/ UEs in/out of car. Educated on tub transfer and equipment if pt uses tub/shower on main level - performed stand step transfer w/ RW and min A - able to lift LLE in/out of tub w/ UEs. Stand step transfer W/C>bed w/ RW and min A of 1. Sit>supine w/ min A of 1 for LLE. Pt required frequent cues for safety awareness w/ transfers. Constantly asking why LLE hurts d/t dementia. Pt left supine in bed w/ alarm engaged, call light within reach, and all needs met at this time.         Cognition  Overall Cognitive Status: Impaired  Arousal/Alterness: appropriate responses to stimuli  Following Commands: follows one step commands consistently  Attention Span: difficulty dividing attention  Memory: decreased recall of precautions, decreased recall of recent events, decreased short term memory  Safety Judgement: decreased awareness of need for assistance, decreased awareness of need for safety  Problem Solving: assistance required to generate solutions, assistance required to implement solutions, decreased awareness of errors  Insights: decreased awareness of deficits  Initiation: requires cues for some  Sequencing: requires cues for some  Comments: baseline dementia but increased confusion during hospital stay   Orientation:    oriented to person, oriented to place, disoriented to time , and disoriented to situation  Command Following:   accurately follows one step commands     Education  Barriers To Learning: cognition  Patient Education: patient educated on goals, OT role and benefits, plan of care, precautions, ADL adaptive strategies, weight-bearing education, orientation, disease specific education, transfer training, discharge recommendations  Learning Assessment:  patient will require reinforcement due to cognitive deficits    Assessment  Activity Tolerance: patient tolerated well. Limited by pain   Impairments Requiring Therapeutic Intervention: decreased functional mobility, decreased ADL status, decreased strength, decreased safety awareness, decreased cognition, decreased endurance, decreased balance, decreased IADL, increased pain, decreased posture  Prognosis: good  Clinical Assessment: Patient presenting below baseline function secondary to left femur fracture. Patient typically independent with ADLs and mobility with no device. Pt progressing and able to complete short distances of ambulation and transfers w/ min A of 1 but initialy reuqired min A of 2. Requires constant cues d/t dementia and increased confusion. Recommend current POC and 24 hour supervision/assist as D/C.   Safety Interventions: patient left in chair, chair alarm in place, call light within reach, patient at risk for falls, and sitter present    Plan  Frequency: 5 x/week, 90 min/day  Current Treatment Recommendations: strengthening, balance training, functional mobility training, transfer training, endurance training, patient/caregiver education, ADL/self-care training, IADL training, cognitive reorientation, safety education, and equipment evaluation/education    Goals  Patient Goals: to return to PLOF   Short Term Goals:  Time Frame: 2 weeks   Patient will complete upper body ADL at modified independent   Patient will complete lower body ADL at modified independent   Patient will complete toileting at modified independent   Patient will complete functional transfers at [facility] independent   Patient to gather and transport IADL items at modified independent Therapy Session Time     Individual Group Co-treatment   Time In    0730   Time Out    0815   Minutes    39     Second Session Therapy Time:   Individual Concurrent Group Co-treatment   Time In       4032   Time Out       1120   Minutes       45       Timed Code Treatment Minutes:  91+82  Total Treatment Minutes:  90 minutes        Electronically Signed By: Jane Latham, [address] [facility], [address] [place] [address], OTR/L [id number]

## 2023-01-14 NOTE — PROGRESS NOTES
[facility] [address]   Phone: [phone]    Physical Therapy    [] Initial Evaluation            [x] Daily Treatment Note         [] Discharge Summary      Patient: Gerri Grove   : 1935   MRN: [id number]   Date of Service:  2023  Admitting Diagnosis: Closed fracture of left femur, sequela  Current Admission Summary: 81-year-old male with a history of BPH, COPD, HTN, HLD, and dementia who was admitted on  with left hip pain after a fall. Work-up revealed a greater trochanter fracture of the left femur. Ortho evaluated and suggested 50% weightbearing on the left lower extremity. He was evaluated by therapy and suggested to continue in an inpatient setting prior to returning home. He was previously living independently at home with the assistance of family. His evening was complicated by increased confusion requiring the placement of a sitter. He remains pleasantly confused this morning. Past Medical History:  has a past medical history of Benign prostatic hypertrophy without urinary obstruction, Bradycardia, COPD (chronic obstructive pulmonary disease) ([address] Dementia ([address] Glaucoma, Gout, Hyperlipidemia, Hypertension, Hypoxemia, Macular degeneration disease, Osteoarthritis, Sleep apnea, and Unspecified sleep apnea. Past Surgical History:  has a past surgical history that includes Total hip arthroplasty (2005); joint replacement; TURP; knee surgery; and Colonoscopy.   Discharge Recommendations: Home with Home Health Physical Therapy, Supervision level pending cognitive progress  DME Required For Discharge: rolling walker  Precautions/Restrictions: high fall risk, weight bearing, ROM restrictions  Weight Bearing Restrictions: partial weight bearing - 50 %  [] Right Upper Extremity  [] Left Upper Extremity [] Right Lower Extremity  [x] Left Lower Extremity     Required Braces/Orthotics: no braces required   [] Right  [] Left  Positional Restrictions:No Active Hip ABduction    Pre-Admission Information    ** Pre-admission information gained from family members in acute care setting **  Lives With: alone                     Type of Home: house  Home Layout: two level, basement with full flight  Home Access:  1 step to enter with handrail. Handrails are located on R side. Bathroom Layout: tub/shower unit, walk in shower--walk-in shower in basement; tub shower on main level. Pt typically uses walk-in shower downstairs. Bathroom Equipment:  no prior equipment  Toilet Height: standard height  Home Equipment: no prior equipment  Transfer Assistance: Independent without use of device  Ambulation Assistance:Independent without use of device  ADL Assistance: independent with all ADL's  IADL Assistance: independent with homemaking tasks--son fills medications; pt able to take them independently every morning. Active :        [x] Yes                 [] No  Hand Dominance: [] Left                 [x] Right  Current Employment: retired. Occupation: real estate  Hobbies: golf with son every weekend  Recent Falls: no recent falls. Examination   Vision:   Vision Corrective Device: wears glasses for reading  Hearing:   [facility]  Observation:   General Observation: Forward flexed posturing that worsens with fatigue  Posture:   Rounded shoulders  Sensation:   WFL and denies numbness and tingling  Coordination Testing:   WFL    ROM:   (L) Hip: significantly limited by pain     (R) Hip: able to lift a few degrees but limited by (L) hip pain  (B) Knee AROM WFL  (B) Ankle AROM WFL  Strength:   Formal MMT held secondary to pain and cognitive deficits  Therapist Clinical Decision Making (Complexity): medium complexity  Clinical Presentation: evolving      Subjective  General: Patient supine in bed upon arrival with [facility] elevated, agreeable to therapy. Pt disoriented to situation, requires consistent education throughout session regarding hip fracture.   Very limited short term carryover within session. C/o pain \"soreness\" with movement  Pain: Patient does not rate upon questioning  Pain Interventions:RN in room giving pain meds at start of session       Functional Mobility  Bed Mobility  Supine to Sit: minimal assistance, at (L) LE  Scooting: minimal assistance  Comments:  Transfers  Sit to stand transfer: 2 person assistance with Min A x 2   Stand to sit transfer: 2 person assistance with Min A of 2   Comments: From EOB that was elevated and from w/c. Ambulation  Surface:level surface  Assistive Device: rolling walker  Assistance: 2 person assistance with Min A x 2 progressing to Min A x 1   Distance: 10' + 15'   Gait Mechanics: Step-to pattern with mild forward flexed posture, increased UE reliance, and small step length  Comments:  Frequent VC/TC for promoting upright posture and sequencing  Stair Mobility  Stair mobility not completed on this date. Comments: Unsafe to attempt secondary to pain and restricted WB status/cognition. Wheelchair Mobility:   No w/c mobility completed on this date. Comments:  Balance  Static Sitting Balance: fair (+): maintains balance at SBA/supervision without use of UE support  Dynamic Sitting Balance: fair (+): maintains balance at SBA/supervision without use of UE support  Static Standing Balance: fair: maintains balance at CGA without use of UE support  Dynamic Standing Balance: fair: maintains balance at CGA without use of UE support  Comments:    Other Therapeutic Interventions   First session:   Stood at the walker with Min A x 2 to urinate, OT assisted with holding the urinal.  Stood ~3-4 minutes. Sat at the EOB to don pants. Refer to OT notes for assist level. Stood to the walker to pull up pants. Min A x 2  Amb. To the bathroom as above and then sat in the w/c to complete hygiene tasks of shaving and teeth brushing. Refer to OT notes for assist level. Amb. To the BS chair and set up with breakfast tray.   Dennis. Marquise Shaver frequently what happened and \"Is that bad? \"   Ice pack placed on hip at end of session. Second session:  Pt. Supine in bed upon arrival, c/o 10/10 pain with movement. Pt. Required Min A for the L LE to get OOB, HOB elevated and use of rail. Pt. Completed sit to/from stand with Min A/Mod A x 1. Amb x 48' with FWW with Min A x 1 and w/c follow. W/c to/from Car with use of walker with Min A. Min A to get LE's into/out of car. W/c to/from Tub transfer bench with Min/Mod A with use of walker for the transfer. W/c to bed with use of walker and Min/Mod x 1. Sit to supine with Min A for L LE. Pt. In significant pain with all movement. Continues to require reexplanation of hip fx. Left supine in bed with call light in reach and bed alarm in place. Use of wedge to roll to right partially. Ice placed on hip. Cognition  Overall Cognitive Status: Impaired  Arousal/Alterness: appropriate responses to stimuli  Following Commands: follows one step commands consistently  Attention Span: attends with cues to redirect, difficulty dividing attention  Memory: decreased recall of precautions, decreased short term memory, decreased long term memory  Safety Judgement: decreased awareness of need for assistance, decreased awareness of need for safety  Problem Solving: assistance required to generate solutions, assistance required to implement solutions, decreased awareness of errors  Insights: decreased awareness of deficits  Initiation: requires cues for some  Sequencing: requires cues for some  Comments: Increased impulsivity with frequent VC for reorientation to surroundings. Patient carryover within conversation/directions less than 1 minute before requiring repetition including recall of hip fracture.   Orientation:    oriented to person, oriented to place, disoriented to time , and disoriented to situation  Command Following:   impaired    Education  Barriers To Learning: cognition  Patient Education: patient educated on goals, PT role and benefits, plan of care, precautions, weight-bearing education, general safety, functional mobility training, proper use of assistive device/equipment, orientation, transfer training  Learning Assessment:  patient will require reinforcement due to cognitive deficits    Assessment  Activity Tolerance: Limited by pain but appears to recover well with rest  Impairments Requiring Therapeutic Intervention: decreased functional mobility, decreased ADL status, decreased ROM, decreased strength, decreased safety awareness, decreased cognition, decreased endurance, decreased balance, increased pain, decreased posture  Prognosis: fair  Clinical Assessment: Patient is a 81year old male who presents with increased pain and impaired functional mobility secondary to a greater trochanter fracture of the left femur from a fall that occurred on 1/11/2023. Prior to admission, patient was independent with all mobility and ADLs without use of an AD. Patient is limited by cognitive deficits leading to impaired safety awareness and risk of falls. Pt. Progressing with ambulation and functional movement. He would benefit from skilled therapy services in order to promote functional independence with all mobility, reduce pain, and decrease risk of falls for return to home. Safety Interventions: patient left in chair, chair alarm in place, call light within reach, gait belt, telesitter in use, and sitter present    Plan  Frequency: 5 x/week, 90 min/day  Current Treatment Recommendations: strengthening, ROM, balance training, functional mobility training, transfer training, gait training, stair training, endurance training, neuromuscular re-education, modalities, patient/caregiver education, ADL/self-care training, cognitive reorientation, pain management, and safety education    Goals  Patient Goals:  To return home   Short Term Goals:  Time Frame: 2 weeks  Patient will complete bed mobility at modified independent   Patient will complete transfers at [facility]   Patient will ambulate 150 ft with use of rolling walker at modified independent  Patient will ascend/descend 4 stairs with (R) ascending handrail at modified independent  Patient will complete car transfer at [facility]    Therapy Session Time  First Session Therapy Time:   Individual Concurrent Group Co-treatment   Time In       0730   Time Out       0815   Minutes       45     Second Session Therapy Time:   Individual Concurrent Group Co-treatment   Time In       1035   Time Out       1120   Minutes       45     Timed Code Treatment Minutes:  57+29    Total Treatment Minutes:   90      Electronically Signed By:   Ashley Gant [place], [id number]

## 2023-01-14 NOTE — PLAN OF CARE
Problem: Discharge Planning  Goal: Discharge to home or other facility with appropriate resources  Outcome: Progressing     Problem: Pain  Goal: Verbalizes/displays adequate comfort level or baseline comfort level  1/14/2023 0714 by Ricardo Chiang RN  Outcome: Progressing  1/13/2023 2237 by Maegan Smith RN  Outcome: Progressing     Problem: Skin/Tissue Integrity  Goal: Absence of new skin breakdown  Outcome: Progressing     Problem: Safety - Adult  Goal: Free from fall injury  Outcome: Progressing     Problem: ABCDS Injury Assessment  Goal: Absence of physical injury  Outcome: Progressing

## 2023-01-14 NOTE — PROGRESS NOTES
Pt's daughter visiting with pt. Jaun Ramos is for pt to go to independent living at [facility], where she recently visited.     Jessica CRAIGN, RN   [phone]

## 2023-01-15 VITALS
DIASTOLIC BLOOD PRESSURE: 82 MMHG | BODY MASS INDEX: 29.34 KG/M2 | WEIGHT: 209.6 LBS | SYSTOLIC BLOOD PRESSURE: 159 MMHG | HEART RATE: 70 BPM | RESPIRATION RATE: 16 BRPM | HEIGHT: 71 IN | TEMPERATURE: 97.9 F | OXYGEN SATURATION: 97 %

## 2023-01-15 PROCEDURE — 6370000000 HC RX 637 (ALT 250 FOR IP): Performed by: PHYSICAL MEDICINE & REHABILITATION

## 2023-01-15 PROCEDURE — 6360000002 HC RX W HCPCS: Performed by: PHYSICAL MEDICINE & REHABILITATION

## 2023-01-15 PROCEDURE — 1280000000 HC REHAB R&B

## 2023-01-15 RX ORDER — BISACODYL 10 MG
10 SUPPOSITORY, RECTAL RECTAL DAILY PRN
Status: DISCONTINUED | OUTPATIENT
Start: 2023-01-15 | End: 2023-01-26 | Stop reason: HOSPADM

## 2023-01-15 RX ADMIN — OXYCODONE 5 MG: 5 TABLET ORAL at 01:43

## 2023-01-15 RX ADMIN — OXYCODONE 5 MG: 5 TABLET ORAL at 14:09

## 2023-01-15 RX ADMIN — DORZOLAMIDE HYDROCHLORIDE 1 DROP: 20 SOLUTION/ DROPS OPHTHALMIC at 20:23

## 2023-01-15 RX ADMIN — TRAZODONE HYDROCHLORIDE 50 MG: 50 TABLET ORAL at 20:21

## 2023-01-15 RX ADMIN — ACETAMINOPHEN 1000 MG: 500 TABLET ORAL at 14:09

## 2023-01-15 RX ADMIN — ACETAMINOPHEN 1000 MG: 500 TABLET ORAL at 20:20

## 2023-01-15 RX ADMIN — STANDARDIZED SENNA CONCENTRATE AND DOCUSATE SODIUM 2 TABLET: 8.6; 5 TABLET ORAL at 20:21

## 2023-01-15 RX ADMIN — BISACODYL 10 MG: 10 SUPPOSITORY RECTAL at 09:06

## 2023-01-15 RX ADMIN — DONEPEZIL HYDROCHLORIDE 10 MG: 5 TABLET, FILM COATED ORAL at 20:21

## 2023-01-15 RX ADMIN — OXYCODONE 5 MG: 5 TABLET ORAL at 09:06

## 2023-01-15 RX ADMIN — ACETAMINOPHEN 1000 MG: 500 TABLET ORAL at 09:05

## 2023-01-15 RX ADMIN — ENOXAPARIN SODIUM 40 MG: 100 INJECTION SUBCUTANEOUS at 20:22

## 2023-01-15 RX ADMIN — MEMANTINE 5 MG: 5 TABLET ORAL at 09:05

## 2023-01-15 RX ADMIN — QUETIAPINE FUMARATE 25 MG: 25 TABLET ORAL at 17:00

## 2023-01-15 RX ADMIN — STANDARDIZED SENNA CONCENTRATE AND DOCUSATE SODIUM 2 TABLET: 8.6; 5 TABLET ORAL at 09:05

## 2023-01-15 RX ADMIN — ONDANSETRON 4 MG: 4 TABLET, ORALLY DISINTEGRATING ORAL at 12:09

## 2023-01-15 RX ADMIN — DORZOLAMIDE HYDROCHLORIDE 1 DROP: 20 SOLUTION/ DROPS OPHTHALMIC at 09:06

## 2023-01-15 RX ADMIN — MEMANTINE 5 MG: 5 TABLET ORAL at 20:21

## 2023-01-15 RX ADMIN — OXYCODONE 5 MG: 5 TABLET ORAL at 20:20

## 2023-01-15 ASSESSMENT — PAIN SCALES - WONG BAKER
WONGBAKER_NUMERICALRESPONSE: 0
WONGBAKER_NUMERICALRESPONSE: 8
WONGBAKER_NUMERICALRESPONSE: 2
WONGBAKER_NUMERICALRESPONSE: 4
WONGBAKER_NUMERICALRESPONSE: 0
WONGBAKER_NUMERICALRESPONSE: 4
WONGBAKER_NUMERICALRESPONSE: 8

## 2023-01-15 ASSESSMENT — PAIN SCALES - GENERAL
PAINLEVEL_OUTOF10: 2
PAINLEVEL_OUTOF10: 7
PAINLEVEL_OUTOF10: 8
PAINLEVEL_OUTOF10: 7
PAINLEVEL_OUTOF10: 0
PAINLEVEL_OUTOF10: 4
PAINLEVEL_OUTOF10: 7
PAINLEVEL_OUTOF10: 0
PAINLEVEL_OUTOF10: 8
PAINLEVEL_OUTOF10: 8
PAINLEVEL_OUTOF10: 6
PAINLEVEL_OUTOF10: 4
PAINLEVEL_OUTOF10: 7

## 2023-01-15 ASSESSMENT — PAIN DESCRIPTION - ONSET
ONSET: ON-GOING

## 2023-01-15 ASSESSMENT — PAIN - FUNCTIONAL ASSESSMENT
PAIN_FUNCTIONAL_ASSESSMENT: PREVENTS OR INTERFERES SOME ACTIVE ACTIVITIES AND ADLS

## 2023-01-15 ASSESSMENT — PAIN DESCRIPTION - DESCRIPTORS
DESCRIPTORS: SORE
DESCRIPTORS: ACHING
DESCRIPTORS: ACHING;DISCOMFORT;SORE

## 2023-01-15 ASSESSMENT — PAIN DESCRIPTION - PAIN TYPE
TYPE: ACUTE PAIN

## 2023-01-15 ASSESSMENT — PAIN DESCRIPTION - LOCATION
LOCATION: HIP

## 2023-01-15 ASSESSMENT — PAIN DESCRIPTION - ORIENTATION
ORIENTATION: LEFT

## 2023-01-15 ASSESSMENT — PAIN DESCRIPTION - FREQUENCY
FREQUENCY: CONTINUOUS
FREQUENCY: INTERMITTENT
FREQUENCY: CONTINUOUS

## 2023-01-15 NOTE — CARE COORDINATION
Discharge Planning:     (CESAR) completed the IMM Letter with the Patient's wife, Cody Kramer who is POA. CM advised that the SW on this floor, Billie Marino would be in contact with her about the Patient and discharging planning needs. Patient's wife advised when Patient leaves the ARU they will both be moving to [facility] for some extra help. She is dropping the deposit off to them on Wednesday and then they can start moving furniture in.      Electronically signed by SONDRA Ruelas on 1/15/2023 at 12:07 PM

## 2023-01-15 NOTE — PLAN OF CARE
Problem: Discharge Planning  Goal: Discharge to home or other facility with appropriate resources  Outcome: Progressing  Flowsheets (Taken 1/14/2023 2126)  Discharge to home or other facility with appropriate resources: Identify barriers to discharge with patient and caregiver     Problem: Pain  Goal: Verbalizes/displays adequate comfort level or baseline comfort level  Outcome: Progressing  Flowsheets (Taken 1/14/2023 2126)  Verbalizes/displays adequate comfort level or baseline comfort level: Encourage patient to monitor pain and request assistance     Problem: Skin/Tissue Integrity  Goal: Absence of new skin breakdown  Description: 1. Monitor for areas of redness and/or skin breakdown  2. Assess vascular access sites hourly  3. Every 4-6 hours minimum:  Change oxygen saturation probe site  4. Every 4-6 hours:  If on nasal continuous positive airway pressure, respiratory therapy assess nares and determine need for appliance change or resting period.   Outcome: Progressing     Problem: Safety - Adult  Goal: Free from fall injury  Outcome: Progressing  Flowsheets (Taken 1/15/2023 0436)  Free From Fall Injury: Instruct family/caregiver on patient safety     Problem: ABCDS Injury Assessment  Goal: Absence of physical injury  Outcome: Progressing  Flowsheets (Taken 1/15/2023 0436)  Absence of Physical Injury: Implement safety measures based on patient assessment

## 2023-01-15 NOTE — PROGRESS NOTES
Pt assisted with ADLs. Pt given dulcolax suppository. Pt tolerated well. Pt up in chair after ADLs. Call light in reach. Sitter at bedside.      Jessica MANZO, RN   [phone]

## 2023-01-15 NOTE — PLAN OF CARE
Problem: Discharge Planning  Goal: Discharge to home or other facility with appropriate resources  1/15/2023 0710 by Keaton Upton RN  Outcome: Progressing  1/15/2023 0438 by Lewis العراقي RN  Outcome: Progressing     Problem: Pain  Goal: Verbalizes/displays adequate comfort level or baseline comfort level  1/15/2023 0710 by Keaton Upton RN  Outcome: Progressing  1/15/2023 0438 by Lewis العراقي RN  Outcome: Progressing     Problem: Skin/Tissue Integrity  Goal: Absence of new skin breakdown  1/15/2023 0710 by Keaton Upton RN  Outcome: Progressing  1/15/2023 0438 by Lewis العراقي RN  Outcome: Progressing     Problem: Safety - Adult  Goal: Free from fall injury  1/15/2023 0710 by Keaton Upton RN  Outcome: Progressing  1/15/2023 0438 by Lewis العراقي RN  Outcome: Progressing     Problem: ABCDS Injury Assessment  Goal: Absence of physical injury  1/15/2023 0710 by Keaton Upton RN  Outcome: Progressing  1/15/2023 0438 by Lewis العراقي RN  Outcome: Progressing

## 2023-01-15 NOTE — PROGRESS NOTES
Daughter requested to speak to RN at bedside. Daughter reports patient takes dorzolamide twice daily and might also take latanoprost nightly for glaucoma. These medications are not ordered and are not listed as a home medications. Daughter headed to patient's house at this time to find out which eye drops patient takes. 2141  Assessment complete. Alert. Oriented to self. Patient unable to retain reorientation information for longer than a few seconds. Denies pain. Order obtained for dorzolamide per Dr. Marques Barton; given per [facility] The care plan and education has been reviewed and mutually agreed upon with the patient. In bed, alarm on, bed in lowest position, call light and table within reach, camera in room, sitter at bedside. No further needs expressed at this time. 0145  Assisted to bathroom using stedy x1 assist from staff. Voided. Patient unsure why his left hip is sore. Reoriented to place, situation. Given PRN oxycodone; Biofreeze applied to site.

## 2023-01-16 ENCOUNTER — TELEPHONE (OUTPATIENT)
Dept: INTERNAL MEDICINE CLINIC | Age: 88
End: 2023-01-16

## 2023-01-16 LAB
ANION GAP SERPL CALCULATED.3IONS-SCNC: 8 MMOL/L (ref 3–16)
BUN BLDV-MCNC: 12 MG/DL (ref 7–20)
CALCIUM SERPL-MCNC: 8.2 MG/DL (ref 8.3–10.6)
CHLORIDE BLD-SCNC: 107 MMOL/L (ref 99–110)
CO2: 29 MMOL/L (ref 21–32)
CREAT SERPL-MCNC: 0.7 MG/DL (ref 0.8–1.3)
GFR SERPL CREATININE-BSD FRML MDRD: >60 ML/MIN/{1.73_M2}
GLUCOSE BLD-MCNC: 99 MG/DL (ref 70–99)
HCT VFR BLD CALC: 34.1 % (ref 40.5–52.5)
HEMOGLOBIN: 11.4 G/DL (ref 13.5–17.5)
MCH RBC QN AUTO: 30.7 PG (ref 26–34)
MCHC RBC AUTO-ENTMCNC: 33.4 G/DL (ref 31–36)
MCV RBC AUTO: 92 FL (ref 80–100)
PDW BLD-RTO: 12.9 % (ref 12.4–15.4)
PLATELET # BLD: 208 K/UL (ref 135–450)
PMV BLD AUTO: 7.8 FL (ref 5–10.5)
POTASSIUM SERPL-SCNC: 4 MMOL/L (ref 3.5–5.1)
RBC # BLD: 3.71 M/UL (ref 4.2–5.9)
SODIUM BLD-SCNC: 144 MMOL/L (ref 136–145)
WBC # BLD: 4.8 K/UL (ref 4–11)

## 2023-01-16 PROCEDURE — 92523 SPEECH SOUND LANG COMPREHEN: CPT

## 2023-01-16 PROCEDURE — 6360000002 HC RX W HCPCS: Performed by: PHYSICAL MEDICINE & REHABILITATION

## 2023-01-16 PROCEDURE — 6370000000 HC RX 637 (ALT 250 FOR IP): Performed by: PHYSICAL MEDICINE & REHABILITATION

## 2023-01-16 PROCEDURE — 97530 THERAPEUTIC ACTIVITIES: CPT

## 2023-01-16 PROCEDURE — 97116 GAIT TRAINING THERAPY: CPT

## 2023-01-16 PROCEDURE — 51798 US URINE CAPACITY MEASURE: CPT

## 2023-01-16 PROCEDURE — 97535 SELF CARE MNGMENT TRAINING: CPT

## 2023-01-16 PROCEDURE — 1280000000 HC REHAB R&B

## 2023-01-16 PROCEDURE — 80048 BASIC METABOLIC PNL TOTAL CA: CPT

## 2023-01-16 PROCEDURE — 85027 COMPLETE CBC AUTOMATED: CPT

## 2023-01-16 PROCEDURE — 36415 COLL VENOUS BLD VENIPUNCTURE: CPT

## 2023-01-16 RX ORDER — LISINOPRIL 5 MG/1
5 TABLET ORAL DAILY
Status: DISCONTINUED | OUTPATIENT
Start: 2023-01-16 | End: 2023-01-20

## 2023-01-16 RX ADMIN — ACETAMINOPHEN 1000 MG: 500 TABLET ORAL at 08:27

## 2023-01-16 RX ADMIN — MEMANTINE 5 MG: 5 TABLET ORAL at 08:27

## 2023-01-16 RX ADMIN — OXYCODONE 5 MG: 5 TABLET ORAL at 02:24

## 2023-01-16 RX ADMIN — LISINOPRIL 5 MG: 5 TABLET ORAL at 12:47

## 2023-01-16 RX ADMIN — ACETAMINOPHEN 1000 MG: 500 TABLET ORAL at 16:41

## 2023-01-16 RX ADMIN — DORZOLAMIDE HYDROCHLORIDE 1 DROP: 20 SOLUTION/ DROPS OPHTHALMIC at 22:53

## 2023-01-16 RX ADMIN — ENOXAPARIN SODIUM 40 MG: 100 INJECTION SUBCUTANEOUS at 22:50

## 2023-01-16 RX ADMIN — OXYCODONE 5 MG: 5 TABLET ORAL at 06:34

## 2023-01-16 RX ADMIN — ACETAMINOPHEN 1000 MG: 500 TABLET ORAL at 23:09

## 2023-01-16 RX ADMIN — DORZOLAMIDE HYDROCHLORIDE 1 DROP: 20 SOLUTION/ DROPS OPHTHALMIC at 08:29

## 2023-01-16 RX ADMIN — STANDARDIZED SENNA CONCENTRATE AND DOCUSATE SODIUM 2 TABLET: 8.6; 5 TABLET ORAL at 08:26

## 2023-01-16 RX ADMIN — OXYCODONE 5 MG: 5 TABLET ORAL at 23:12

## 2023-01-16 ASSESSMENT — PAIN SCALES - GENERAL
PAINLEVEL_OUTOF10: 5
PAINLEVEL_OUTOF10: 0
PAINLEVEL_OUTOF10: 4
PAINLEVEL_OUTOF10: 4
PAINLEVEL_OUTOF10: 6
PAINLEVEL_OUTOF10: 0
PAINLEVEL_OUTOF10: 5
PAINLEVEL_OUTOF10: 0
PAINLEVEL_OUTOF10: 5
PAINLEVEL_OUTOF10: 5
PAINLEVEL_OUTOF10: 6
PAINLEVEL_OUTOF10: 6

## 2023-01-16 ASSESSMENT — PAIN DESCRIPTION - ORIENTATION
ORIENTATION: LEFT

## 2023-01-16 ASSESSMENT — PAIN DESCRIPTION - DESCRIPTORS
DESCRIPTORS: SORE
DESCRIPTORS: SORE
DESCRIPTORS: ACHING
DESCRIPTORS: SORE
DESCRIPTORS: SORE
DESCRIPTORS: ACHING

## 2023-01-16 ASSESSMENT — PAIN - FUNCTIONAL ASSESSMENT
PAIN_FUNCTIONAL_ASSESSMENT: PREVENTS OR INTERFERES SOME ACTIVE ACTIVITIES AND ADLS
PAIN_FUNCTIONAL_ASSESSMENT: ACTIVITIES ARE NOT PREVENTED
PAIN_FUNCTIONAL_ASSESSMENT: PREVENTS OR INTERFERES SOME ACTIVE ACTIVITIES AND ADLS

## 2023-01-16 ASSESSMENT — PAIN SCALES - WONG BAKER
WONGBAKER_NUMERICALRESPONSE: 4
WONGBAKER_NUMERICALRESPONSE: 4
WONGBAKER_NUMERICALRESPONSE: 0
WONGBAKER_NUMERICALRESPONSE: 6
WONGBAKER_NUMERICALRESPONSE: 0
WONGBAKER_NUMERICALRESPONSE: 0

## 2023-01-16 ASSESSMENT — PAIN DESCRIPTION - PAIN TYPE
TYPE: ACUTE PAIN

## 2023-01-16 ASSESSMENT — PAIN DESCRIPTION - ONSET
ONSET: ON-GOING

## 2023-01-16 ASSESSMENT — PAIN DESCRIPTION - FREQUENCY
FREQUENCY: CONTINUOUS

## 2023-01-16 ASSESSMENT — PAIN DESCRIPTION - LOCATION
LOCATION: HIP

## 2023-01-16 NOTE — PROGRESS NOTES
Assessment completed. Patient sitting up in bed, alert to self and able to make all his needs known. Anxious at times. Sitter at bedside. C/o pain to left hip. Medications administered as ordered. POC and education reviewed with patient. Safety interventions in place, Continues on video monitoring. Call light in reach. Will continue to monitor.

## 2023-01-16 NOTE — PLAN OF CARE
Problem: Discharge Planning  Goal: Discharge to home or other facility with appropriate resources  Outcome: Progressing  Flowsheets (Taken 1/15/2023 2016)  Discharge to home or other facility with appropriate resources: Identify barriers to discharge with patient and caregiver     Problem: Pain  Goal: Verbalizes/displays adequate comfort level or baseline comfort level  Outcome: Progressing  Flowsheets (Taken 1/15/2023 2016)  Verbalizes/displays adequate comfort level or baseline comfort level: Encourage patient to monitor pain and request assistance     Problem: Skin/Tissue Integrity  Goal: Absence of new skin breakdown  Description: 1. Monitor for areas of redness and/or skin breakdown  2. Assess vascular access sites hourly  3. Every 4-6 hours minimum:  Change oxygen saturation probe site  4. Every 4-6 hours:  If on nasal continuous positive airway pressure, respiratory therapy assess nares and determine need for appliance change or resting period.   Outcome: Progressing     Problem: Safety - Adult  Goal: Free from fall injury  Outcome: Progressing  Flowsheets (Taken 1/16/2023 0331)  Free From Fall Injury: Instruct family/caregiver on patient safety     Problem: ABCDS Injury Assessment  Goal: Absence of physical injury  Outcome: Progressing  Flowsheets (Taken 1/16/2023 0331)  Absence of Physical Injury: Implement safety measures based on patient assessment

## 2023-01-16 NOTE — PROGRESS NOTES
Boy Cormier  1/16/2023  [id number]    Chief Complaint: Closed fracture of left femur, sequela    Subjective:   No significant weekend events. No current complaints. Labs reviewed. Improved cognition this am compared to prior. ROS: No CP, SOB, dyspnea    Objective:  Patient Vitals for the past 24 hrs:   BP Temp Temp src Pulse Resp SpO2   01/16/23 0823 (!) 166/79 97.8 °F (36.6 °C) Oral 64 16 94 %   01/16/23 0224 -- -- -- -- 12 --   01/15/23 2016 (!) 159/82 97.9 °F (36.6 °C) Oral 70 16 97 %     Gen: No distress, pleasant. Resting in bed  HEENT: Normocephalic, atraumatic. CV: Regular rate and rhythm. No MRG   Resp: No respiratory distress. CTAB   Abd: Soft, nontender, nondistended  Ext: No edema. Neuro: Alert, oriented, appropriately interactive. Higher level cognitive deficits present    Laboratory data: Available via EMR. Therapy progress:    PT    Supine to Sit: Partial/moderate assistance  Sit to Supine:     Sit to Stand: Dependent  Chair/Bed to Chair Transfer: Dependent  Car Transfer:    Ambulation 10 ft: Dependent  Ambulation 50 ft:    Ambulation 150 ft:    Stairs - 1 Step:    Stairs - 4 Step:    Stairs - 12 Step:      OT    Eating: Setup or clean-up assistance  Oral Hygiene: Setup or clean-up assistance  Bathing: Substantial/maximal assistance  Upper Body Dressing: Partial/moderate assistance  Lower Body Dressing: Substantial/maximal assistance  Toilet Transfer: Partial/moderate assistance  Toilet Hygiene: Partial/moderate assistance    Speech Therapy         Body mass index is 29.23 kg/m².     Assessment:  Patient Active Problem List   Diagnosis    Benign prostatic hyperplasia without urinary obstruction    Mixed hyperlipidemia    Bradycardia    Primary hypertension    Pre-diabetes    Primary osteoarthritis of right knee    Memory impairment    Nipple soreness    Skin lesion on examination    Late onset Alzheimer's dementia without behavioral disturbance (HCC)    Vitamin D insufficiency Nondisplaced fracture of greater trochanter of left femur, initial encounter for closed fracture ([facility] [address]    Unable to ambulate    Alzheimer's dementia ([facility] [address]    Imbalance    Ataxia    Closed fracture of left femur, sequela       Plan:   Left greater trochanter femur fracture: 50% WBAT in LLE. PT/OT. Pain control. Dementia: resume namenda and aricept per home regimen. Seroquel 25 as needed     HTN: hold lisinopril 10 held. - resume 5 mg        Bowels: Per protocol  Bladder: Per protocol   Sleep: Trazodone provided prn. Pain: tylenol scheduled, kelly PRN 5mg  DVT PPx: lovenox   ARSENIO: GUI Hardin MD 1/16/2023, 8:58 AM    * This document was created using dictation software. While all precautions were taken to ensure accuracy, errors may have occurred. Please disregard any typographical errors.

## 2023-01-16 NOTE — PATIENT CARE CONFERENCE
[facility]  Inpatient Rehabilitation  Weekly Team Conference Note    Patient Name: Melida Ashley        MRN: [id number]    : 1935  (81 y.o.)  Gender: male           The team conference for this patient was held on 2023 at 11:00am and led by:  Beto Car MD    CASE MANAGEMENT:  Assessment:   Patient is a 81year old male who admitted to ARU on 2023 with the admitting diagnosis of  Closed fracture of left femur, sequela. Patient resides at home alone and have children who are supportive. Patient would benefit from skilled ARU PT/OT/SLP to promote increased safety and independence in order to return to his prior level of functioning. Patient's discharge plan to be determine with patient progress. PHYSICAL THERAPY:    Bed Mobility  Supine to Sit: stand by assistance  Scooting: stand by assistance  Comments:  Transfers  Sit to stand transfer: contact guard assistance  Stand to sit transfer: contact guard assistance  Stand step transfer: contact guard assistance  Comments: All performed with use of RW. Ambulation  Surface:level surface  Assistive Device: rolling walker  Assistance: contact guard assistance  Distance: 10' + 15' + 69' + 36'  Gait Mechanics: Step-to pattern with mild forward flexed posture, increased UE reliance, and small step length  Comments:  Frequent VC/TC for promoting upright posture   Stair Mobility  Patient ascends/descends 1 step with (B) HR at [facility] however additional steps held secondary to inability to maintain WB status.   Comments:     QM:  Roll Left and Right  Assistance Needed: Supervision or touching assistance  Reason if not Attempted: Not attempted due to medical condition or safety concerns  CARE Score: 4  Discharge Goal: Independent  Sit to Lying  Assistance Needed: Partial/moderate assistance  CARE Score: 3  Discharge Goal: Independent  Lying to Sitting on Side of Bed  Assistance Needed: Supervision or touching assistance  CARE Score: 4  Discharge Goal: Independent  Sit to Stand  Assistance Needed: Supervision or touching assistance  CARE Score: 4  Discharge Goal: Independent  Chair/Bed-to-Chair Transfer  Assistance Needed: Supervision or touching assistance  CARE Score: 4  Discharge Goal: Independent  Car Transfer  Assistance Needed: Supervision or touching assistance  Reason if not Attempted: Not attempted due to medical condition or safety concerns  CARE Score: 4  Discharge Goal: Independent  Walk 10 Feet  Assistance Needed: Supervision or touching assistance  CARE Score: 4  Discharge Goal: Independent  Walk 50 Feet with Two Turns  Assistance Needed: Supervision or touching assistance  Reason if not Attempted: Not attempted due to medical condition or safety concerns  CARE Score: 4  Discharge Goal: Independent  Walk 150 Feet  Reason if not Attempted: Not attempted due to medical condition or safety concerns  CARE Score: 88  Discharge Goal: Independent  Walking 10 Feet on Uneven Surfaces  Reason if not Attempted: Not attempted due to medical condition or safety concerns  CARE Score: 88  Discharge Goal: Independent  1 Step (Curb)  Assistance Needed: Supervision or touching assistance  Reason if not Attempted: Not attempted due to medical condition or safety concerns  CARE Score: 4  Discharge Goal: Independent  4 Steps  Reason if not Attempted: Not attempted due to medical condition or safety concerns  CARE Score: 88  Discharge Goal: Independent  12 Steps  Reason if not Attempted: Not attempted due to medical condition or safety concerns  CARE Score: 88  Discharge Goal: Independent  Picking Up Object  Reason if not Attempted: Not attempted due to medical condition or safety concerns  CARE Score: 88  Discharge Goal: Independent  Wheelchair Ability  Uses a Wheelchair and/or Scooter?: Yes    Goals:                   Patient Goals:  To return home   Short Term Goals:  Time Frame: 2 weeks    Patient will complete bed mobility at [facility] independent   Patient will complete transfers at [facility]   Patient will ambulate 150 ft with use of rolling walker at modified independent  Patient will ascend/descend 4 stairs with (R) ascending handrail at modified independent  Patient will complete car transfer at [facility]               These goals were reviewed with this patient at the time of assessment and Jessicasharlene Smith is in agreement. Plan of Care: Pt to be seen 5 out of 7 days per week per ARU protocol ( 90 minutes with PT)                     SPEECH THERAPY:    Diet Level:ADULT DIET; Regular    Assessment: Impressions  Diagnosis: Pt presents with moderate to severe short term memory deficits and reduced carry over of newly learned information impacting his cognitive flexibility and problem solving. During evaluation, pt looked to cognitive aid in room (calendar) to improve orientation and recall of written information, however unable to recall why certain things were written. Per pt report pt was independent at home with family support prior to admission and has since demonstrated increased confusion and disorientation in new environment. Pt would benefit from a trial of speech therapy to enhance cognitive function with use of cognitive aides and space retrieval techniques in attempt to improve functional carry over and maximize independence at discharge. Goals:               Time Frame: 7-12 days      Pt will recall functional information (daily tasks, hip precautions, personal hx, etc.) with >70% accuracy with set up of cognitive aids. Continue   Patient will complete functional problem-solving tasks for daily situations with 80% accuracy, given min cues. Continue   Patient will be instructed in memory strategies to utilize in order to promote recall of newly learned information with >70% min cues.  Continue     Plan of Care:  Pt to be seen 5 out of 7 days per week per ARU protocol (30 minutes with SLP)    OCCUPATIONAL THERAPY:    ADL:   Grooming: setup assistance contact guard assistance  Grooming Comments: patient applied deodorant seated on BSC in shower. Patient completed oral hygiene standing at sink with CGA and RW  Upper Extremity Bathing: stand by assistance requires verbal cueing  Lower Extremity Bathing: moderate assistance   Bathing Comments: patient completed shower seated on [facility] with HH shower head in walk in shower. SBA and cueing for UB. modA for lower legs. Patient required CGA for balance while in stance for rear periarea. Cues for hand placement and use of grab bars. Patient may benefit from [facility]    Upper Extremity Dressing: stand by assistance  Lower Extremity Dressing: moderate assistance maximum assistance  Dressing Comments: SBA for doffing/donning shirt. modA for doffing/donning pullup and pants. maxA for doffing/donning socks. Patient may benefit from AE   General Comments: Patient declining toileting.       Toilet Transfers: Haydee from [facility] overtop toilet with RW     Tub/ShowerTransfers: CGA with grab bars     QM:  Eating  Assistance Needed: Setup or clean-up assistance  CARE Score: 5  Discharge Goal: Independent  Oral Hygiene  Assistance Needed: Supervision or touching assistance  Reason if not Attempted: Patient refused  CARE Score: 4  Discharge Goal: [address] needed: Partial/moderate assistance  Reason if not Attempted: Patient refused  CARE Score: 3  Discharge Goal: Independent  Toilet Transfer  Assistance needed: Substantial/maximal assistance  Comment: did not want to get up to toilet; instead, chose to use urinal in bed or while standing on stedy  Reason if not Attempted: Patient refused  CARE Score: 7  Discharge Goal: Independent  Shower/Bathe Self  Assistance Needed: Partial/moderate assistance  CARE Score: 3  Discharge Goal: Independent  Upper Body Dressing  Assistance Needed: Supervision or touching assistance  CARE Score: 4  Discharge Goal: Independent  Lower Body Dressing  Assistance Needed: Partial/moderate assistance  CARE Score: 3  Discharge Goal: Independent  Putting On/Taking Off Footwear  Assistance Needed: Substantial/maximal assistance  CARE Score: 2  Discharge Goal: Independent    Goals:               Patient Goals: to return to PLOF   Short Term Goals:  Time Frame: 2 weeks   Patient will complete upper body ADL at [facility] independent   Patient will complete lower body ADL at [facility] independent   Patient will complete toileting at modified independent   Patient will complete functional transfers at [facility] independent   Patient to gather and transport IADL items at modified independent       These goals were reviewed with this patient at the time of assessment and Lluvia Enriquez is in agreement    Plan of Care:  Pt to be seen 5 out of 7 days per week per ARU protocol ( 90 minutes with OT)     NUTRITION:  Weight: 209 lb 9.6 oz (95.1 kg) (bed zerod 2 pillows, 1 sheet, 1 fitted sheet, 1 kaitlyn) / Body mass index is 29.23 kg/m². Diet Order:Regular    Supplements:NA    Pt receives a regular diet with po intake % of meals. No wt loss per hx; skin is intact. Pt is at low risk for nutrition compromise @ this time. Please see nutrition note for details. NURSING:    Risk for Readmission: 14%    Street Fall Risk Score: 75  Wounds/Incisions/Ulcers: None  Medication Review: Reviewed daily with patient  Pain: Managed with and without medications  Consultations/Labs/X-rays:    Labs: BMP & CBC every Monday & Thursday    Patient/Family Education provided by team:    Discharge Plan   Estimated Length of Stay:9 days  Destination: home health  Pass:No  Services at Discharge: HHOT/PT, SLP  Equipment at Discharge: rolling walker, shower chair with back   Factors facilitating achievement of predicted outcomes: PLOF, Family support, Cooperative, and Pleasant  Barriers to the achievement of predicted outcomes: Pain, confusion, baseline dementia     Patient Goals:   To return home and return to OF. Interdisciplinary Individualized Plan of Care Review of Previous Week:    Medical and functional progress towards goals:  Medically doing ok, discharged BP meds due to low BP and now BP coming back up, will restart. Pain still present, pain meds ordered, pt still needed for therapy participation, some agitation at times. Transfers and ambulation improved, but pain increased with increased mobility. Pt with increased ambulation distance, increased independence with ADLs. Pt limited by impaired cognition, recall impaired, pt with decreased safety awareness. Barriers towards progress:  Pain, Agitation, impaired cognition  Interventions to address Barriers:  Pain meds ordered, pt educated on pain management, seroquel ordered as needed, SLP working with patient on cognition. Goals still appropriate:  Yes  Modifications to goals: None  Continue Current Plan of Care:  Yes  Modifications to Plan of Care: None    Rehab Team Members in attendance for Team Conference:  Judy Carter, MSW, LSW    Katia Connors, RD, LD    Nina Gregg, OTR/L, [id number]     Amanda Lion, PT, DPT    Nohemy Chapa M.A., KAYLEY Monique PT, DPT,     I approve the established interdisciplinary plan of care as documented within the medical record of [address].     Jia Kirk MD   Electronically signed by Jia Kirk MD on 1/17/2023 at 11:24 AM

## 2023-01-16 NOTE — TELEPHONE ENCOUNTER
----- Message from Uvaldo Kenneth sent at 1/16/2023  9:21 AM EST -----  Subject: Message to Provider    QUESTIONS  Information for Provider? Patient fell, broke hip, in hospital, being   [facility]  ---------------------------------------------------------------------------  --------------  [id number]  [phone]; OK to leave message on voicemail  ---------------------------------------------------------------------------  --------------  SCRIPT ANSWERS  Relationship to Patient? Other  Representative Name? Kyra Nicolas  Is the Representative on the appropriate HIPAA document in Epic?  Yes

## 2023-01-16 NOTE — PROGRESS NOTES
[facility] [facility]   Phone: [phone]    Physical Therapy    [] Initial Evaluation            [x] Daily Treatment Note         [] Discharge Summary      Patient: Spencer Arias   : 1935   MRN: [id number]   Date of Service:  2023  Admitting Diagnosis: Closed fracture of left femur, sequela  Current Admission Summary: 16-year-old male with a history of BPH, COPD, HTN, HLD, and dementia who was admitted on  with left hip pain after a fall. Work-up revealed a greater trochanter fracture of the left femur. Ortho evaluated and suggested 50% weightbearing on the left lower extremity. He was evaluated by therapy and suggested to continue in an inpatient setting prior to returning home. He was previously living independently at home with the assistance of family. His evening was complicated by increased confusion requiring the placement of a sitter. He remains pleasantly confused this morning. Past Medical History:  has a past medical history of Benign prostatic hypertrophy without urinary obstruction, Bradycardia, COPD (chronic obstructive pulmonary disease) ([address] Dementia ([address] Glaucoma, Gout, Hyperlipidemia, Hypertension, Hypoxemia, Macular degeneration disease, Osteoarthritis, Sleep apnea, and Unspecified sleep apnea. Past Surgical History:  has a past surgical history that includes Total hip arthroplasty (2005); joint replacement; TURP; knee surgery; and Colonoscopy.   Discharge Recommendations: Home with Home Health Physical Therapy, Supervision level pending cognitive progress  DME Required For Discharge: rolling walker  Precautions/Restrictions: high fall risk, weight bearing, ROM restrictions  Weight Bearing Restrictions: partial weight bearing - 50 %  [] Right Upper Extremity  [] Left Upper Extremity [] Right Lower Extremity  [x] Left Lower Extremity     Required Braces/Orthotics: no braces required   [] Right  [] Left  Positional Restrictions:No Active Hip ABduction    Pre-Admission Information    ** Pre-admission information gained from family members in acute care setting **  Lives With: alone                     Type of Home: house  Home Layout: two level, basement with full flight  Home Access:  1 step to enter with handrail. Handrails are located on R side. Bathroom Layout: tub/shower unit, walk in shower--walk-in shower in basement; tub shower on main level. Pt typically uses walk-in shower downstairs. Bathroom Equipment:  no prior equipment  Toilet Height: standard height  Home Equipment: no prior equipment  Transfer Assistance: Independent without use of device  Ambulation Assistance:Independent without use of device  ADL Assistance: independent with all ADL's  IADL Assistance: independent with homemaking tasks--son fills medications; pt able to take them independently every morning. Active :        [x] Yes                 [] No  Hand Dominance: [] Left                 [x] Right  Current Employment: retired. Occupation: real estate  Hobbies: golf with son every weekend  Recent Falls: no recent falls. Examination   Vision:   Vision Corrective Device: wears glasses for reading  Hearing:   [employer]      Subjective  General: Patient supine in bed upon arrival with [facility] elevated, agreeable to therapy. Pt disoriented to situation, requires consistent education throughout session regarding hip fracture. Very limited short term carryover within session. Pain: 5/10. Location: (L) hip  Pain Interventions: RN in room giving pain meds at start of session       Functional Mobility  Bed Mobility  Supine to Sit: stand by assistance  Scooting: stand by assistance  Comments:  Transfers  Sit to stand transfer: contact guard assistance  Stand to sit transfer: contact guard assistance  Stand step transfer: contact guard assistance  Comments: All performed with use of RW.   Ambulation  Surface:level surface  Assistive Device: rolling walker  Assistance: contact guard assistance  Distance: 8' + 15' + 69' + 36'  Gait Mechanics: Step-to pattern with mild forward flexed posture, increased UE reliance, and small step length  Comments:  Frequent VC/TC for promoting upright posture   Stair Mobility  Stair mobility not completed on this date. Comments:   Wheelchair Mobility:   No w/c mobility completed on this date. Comments:  Balance  Static Sitting Balance: fair (+): maintains balance at SBA/supervision without use of UE support  Dynamic Sitting Balance: fair (+): maintains balance at SBA/supervision without use of UE support  Static Standing Balance: fair: maintains balance at CGA without use of UE support  Dynamic Standing Balance: fair (-): maintains balance at CGA with use of UE support  Comments:    Other Therapeutic Interventions   First session:   See functional mobility above. Shower completed with OT team member to maximize patient performance and maintain patient safety. Pt ambulates bed => shower seat surface as documented above. Pt requires SBA to maintain dynamic sitting balance during shower completion in addition to OT assist with ADL task completion. Initial STS in shower performed at min A x 1 with use of grab bars, all remaining transfers completed at [facility]. In addition to shower patient performed ball tap to floor plus overhead reach at [facility] x 5. [facility] reaching outside [place] and behind back x 2 at [facility]. Patient required continuous education regarding hip fracture throughout session. Ice applied to (L) hip at the end of the session. Second session:     Patient seated in recliner upon entry, agreeable to therapy. Reporting 5/10 pain in the (L) hip. Patient performed all transfers at [facility] x 1 with use of RW this session. In addition patient ambulated 20' x 2 at [facility] with use of RW. Stair navigation attempted this date with scale underneath (L) LE. Patient ascended/descended one step with use of (B) HR.  However, he was unable to maintain WB precautions, additional stair completion held to maintain safety. Educated patient on importance of maintaining his precautions to allow his (L) hip to heal. Car transfer performed with use of RW at [facility]. VC provided for appropriate hand placement with returning to stand from the car. Dynamic standing activities performed at [facility] without use of AD: ball toss and ball bounce. No LOB occurred. W/c  mobility performed independently 90' x 2 + multiple small bouts. Patient returned to recliner with chair alarm in place, call light within reach, and sitter present. Ice applied to (L) hip at the end of the session. Cognition  Overall Cognitive Status: Impaired  Arousal/Alterness: appropriate responses to stimuli  Following Commands: follows one step commands consistently  Attention Span: attends with cues to redirect, difficulty dividing attention  Memory: decreased recall of precautions, decreased short term memory, decreased long term memory  Safety Judgement: decreased awareness of need for assistance, decreased awareness of need for safety  Problem Solving: assistance required to generate solutions, assistance required to implement solutions, decreased awareness of errors  Insights: decreased awareness of deficits  Initiation: requires cues for some  Sequencing: requires cues for some  Comments: Increased impulsivity with frequent VC for reorientation to surroundings. Patient carryover within conversation/directions less than 1 minute before requiring repetition including recall of hip fracture.   Orientation:    oriented to person, oriented to place, disoriented to time , and disoriented to situation  Command Following:   impaired    Education  Barriers To Learning: cognition  Patient Education: patient educated on goals, PT role and benefits, plan of care, precautions, weight-bearing education, general safety, functional mobility training, proper use of assistive device/equipment, orientation, transfer training  Learning Assessment:  patient will require reinforcement due to cognitive deficits    Assessment  Activity Tolerance: Good. No increase in pain reported with all mobility this date. Impairments Requiring Therapeutic Intervention: decreased functional mobility, decreased ADL status, decreased ROM, decreased strength, decreased safety awareness, decreased cognition, decreased endurance, decreased balance, increased pain, decreased posture  Prognosis: fair  Clinical Assessment: Patient is showing improvements in all functional mobility this date as evidenced by less assistance required for transfers in ambulation. Patient has progressed to requiring mod A x 2 to CGA x 1. Plan to separate future therapy services secondary to progressed functional mobility status. However, patient continues to be limited by cognitive deficits impacting his safety awareness. Stair navigation limited due to inability to maintain WB precautions. Patient would continue to benefit from skilled therapy services in order to promote functional independence and improved mobility. Safety Interventions: patient left in chair, chair alarm in place, call light within reach, gait belt, telesitter in use, and sitter present    Plan  Frequency: 5 x/week, 90 min/day  Current Treatment Recommendations: strengthening, ROM, balance training, functional mobility training, transfer training, gait training, stair training, endurance training, neuromuscular re-education, modalities, patient/caregiver education, ADL/self-care training, cognitive reorientation, pain management, and safety education    Goals  Patient Goals:  To return home   Short Term Goals:  Time Frame: 2 weeks  Patient will complete bed mobility at modified independent   Patient will complete transfers at [facility]   Patient will ambulate 150 ft with use of rolling walker at modified independent  Patient will ascend/descend 4 stairs with (R) ascending handrail at modified independent  Patient will complete car transfer at [facility]    Therapy Session Time  First Session Therapy Time:   Individual Concurrent Group Co-treatment   Time In        0830   Time Out        0930   Minutes        60     Second Session Therapy Time:   Individual Concurrent Group Co-treatment   Time In        1035   Time Out        1105   Minutes        30     Timed Code Treatment Minutes:  60+30 minutes    Total Treatment Minutes:   90 minutes      Electronically Signed By:   Dalila Epps SPT  Therapist observed and directed the patient's plan of care.   Co-signed and supervised by: Sena Lunsford PT, DPT - [id number]

## 2023-01-16 NOTE — CARE COORDINATION
SW/CESAR met with patient and his daughter and completed SW Assessment. Electronically signed by SONDRA Fontanez on 1/16/2023 at 4:57 PM     Social Work Admission Assessment    Objective:  Met with patient and his daughter to complete initial assessment and review role of  in rehab process. Pt oriented to unit. Pt states understanding of this. Current Home Situation:  Patient lives at home alone in a ranch style home. Family reporting that patient will not be able to return home alone. Family has made arrangements for the patient to discharge to a independent Living program with some supports. Pt's plans re:  Return to work/school/volunteer:  Patient is retired. Accessibility to community resources/transportation:  Patient is not connected to any community resource programs at present./Family will provide transportation @ discharge. Has pt experienced a recent loss or signigicant life event that would impact their care or ability to participate? _x_No  __Yes - Explain    Has pt ever been treated for emotional disorders? _x_No  __Yes--How does that affect current situation:    How does pt and family cope with stressful events and this hospitalization? Patient has coped well with stressful events in the past according to his daughter. Daughter stating that patient is having trouble adjusting to this hospitalization and the decision for patient not to return home. Special Problem Areas:  Dementia/Giving up independence. Discharge Plan: To discharge to a Independent living program with some supports. Impression/Plan: Rabia Duran (patient )is a 81year old male that has been admitted to ARU. Provided patient/daughter with this SW's contact information to contact as needed. Will continue to follow for support and discharge planning.      Electronically signed by SONDRA Fontanez on 1/17/2023 at 9:12 AM

## 2023-01-16 NOTE — PLAN OF CARE
Problem: Discharge Planning  Goal: Discharge to home or other facility with appropriate resources  1/16/2023 1218 by Crystal Polk RN  Outcome: Progressing  1/16/2023 0334 by Guille Bañuelos RN  Outcome: Progressing  Flowsheets (Taken 1/15/2023 2016)  Discharge to home or other facility with appropriate resources: Identify barriers to discharge with patient and caregiver     Problem: Pain  Goal: Verbalizes/displays adequate comfort level or baseline comfort level  1/16/2023 1218 by Crystal Polk RN  Outcome: Progressing  1/16/2023 0334 by Guille Bañuelos RN  Outcome: Progressing  Flowsheets (Taken 1/15/2023 2016)  Verbalizes/displays adequate comfort level or baseline comfort level: Encourage patient to monitor pain and request assistance     Problem: Skin/Tissue Integrity  Goal: Absence of new skin breakdown  Description: 1. Monitor for areas of redness and/or skin breakdown  2. Assess vascular access sites hourly  3. Every 4-6 hours minimum:  Change oxygen saturation probe site  4. Every 4-6 hours:  If on nasal continuous positive airway pressure, respiratory therapy assess nares and determine need for appliance change or resting period.   1/16/2023 1218 by Crystal Polk RN  Outcome: Progressing  1/16/2023 0334 by Guille Bañuelos RN  Outcome: Progressing     Problem: Safety - Adult  Goal: Free from fall injury  1/16/2023 1218 by Crystal Polk RN  Outcome: Progressing  1/16/2023 0334 by Guille Bañuelos RN  Outcome: Progressing  Flowsheets (Taken 1/16/2023 0331)  Free From Fall Injury: Instruct family/caregiver on patient safety     Problem: ABCDS Injury Assessment  Goal: Absence of physical injury  1/16/2023 1218 by Crystal Polk RN  Outcome: Progressing  1/16/2023 0334 by Guille Bañuelos RN  Outcome: Progressing  Flowsheets (Taken 1/16/2023 0331)  Absence of Physical Injury: Implement safety measures based on patient assessment

## 2023-01-16 NOTE — PROGRESS NOTES
[facility]   Phone: [phone]    Speech Therapy    [x] Initial Evaluation            [] Daily Treatment Note         [] Discharge Summary      Patient: Callum Jain   : 1935   MRN: [id number]   Date of Service:  2023  Admitting Diagnosis: Closed fracture of left femur, sequela  Current Admission Summary: 64-year-old male with a history of BPH, COPD, HTN, HLD, and dementia who was admitted on  with left hip pain after a fall. Work-up revealed a greater trochanter fracture of the left femur. Ortho evaluated and suggested 50% weightbearing on the left lower extremity. He was evaluated by therapy and suggested to continue in an inpatient setting prior to returning home. He was previously living independently at home with the assistance of family. His evening was complicated by increased confusion requiring the placement of a sitter. He remains pleasantly confused this morning. Past Medical History:  has a past medical history of Benign prostatic hypertrophy without urinary obstruction, Bradycardia, COPD (chronic obstructive pulmonary disease) ([address] Dementia ([address] Glaucoma, Gout, Hyperlipidemia, Hypertension, Hypoxemia, Macular degeneration disease, Osteoarthritis, Sleep apnea, and Unspecified sleep apnea. Past Surgical History:  has a past surgical history that includes Total hip arthroplasty (2005); joint replacement; TURP; knee surgery; and Colonoscopy.   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 Pt pleasant and cooperative occasionally closing eyes throughout evaluation. Pain: Reported pain, 6/10 in L hip, denied need for pain intervention, reported he thinks he got pain medication prior to session  Safety Interventions: patient left in chair, chair alarm in place, call light within reach, patient at risk for falls, telesitter in use, and sitter present      Speech Language Cognitive Evaluation     Patient complaint: When asked pt admits to being more confused in the hospital setting. Pt does have a formal diagnosis of dementia per chart review. Comprehension  Auditory Comprehension:   Impairment Severity:Within functional limits  and Mild   Impaired Multi-step commands  Impaired Complex/Abstract commands  Occasionally requires repetition of abstract commands     Reading Comprehension:   Impairment Severity: To be assessed   Expression  Primary Mode of Expression:   Verbal  Verbal Expression:   Impairment Severity:Within functional limits      Written Expression:   Impairment Severity:Within functional limits  and To be assessed   Pragmatics/Social Functioning:   Impairment Severity:Mild  and Moderate   Impaired Eye contact (pt occasionally closing eyes)  Motor Speech  Impairment Severity:Within functional limits      Voice  Impairment Severity:Within functional limits      Cognition   [] Unable to be assessed secondary to Aphasia     Overall Orientation:   Impairment Severity:Mild   Comment:  disoriented to month, RUSS, and date   Attention:   Impairment Severity: To be assessed   Selective Attention  Alternating Attention  Divided Attention  Memory:   Impairment Severity:Moderate  and Severe   Impaired Short-term Memory  Impaired Recall of New Learning   Impaired Daily Routines  Impaired Long-term Memory  Impaired Prospective Memory  Problem Solving:   Impairment Severity:Mild  and To be assessed   Impaired Complex Tasks   Safety/Judgement:   Impairment Severity:Moderate , Severe , and To be assessed   Unable to self-monitor and self correct consistently   Impaired Insight  Impaired Flexibility of thought    Additional Assessment  The patient was administered the Neurobehavioral Cognitive Status Examination (NCSE) which assesses orientation, attention, language, constructional ability, memory, reasoning and judgement. The patient scored the following:     Component Score Total Severity of Deficits   Orientation 9 12 Falling between mild and average range   Attention 8 8 average   Comprehension 5 6 average   Repetition 12 12 average   Naming 6 8 Falling between mild and average range   Constructional Ability DNT 6 NA   Memory 1 12 severe   Calculations 4 4 average   Similarities  4 8 mild   Judgement 4 6 average         Education  Barriers To Learning: cognition  Patient Education: Provided education regarding role of SLP, results of assessment, recommendations and general speech pathology plan of care. Learning Assessment: Pt verbalized understanding   Pt requires ongoing learning     Assessment  Impairments Requiring Therapeutic Intervention: Cognitive-Linguistic Deficits   Prognosis: fair (baseline dementia)    Clinical Assessment: Pt presents with moderate to severe short term memory deficits and reduced carry over of newly learned information impacting his cognitive flexibility and problem solving. During evaluation, pt looked to cognitive aid in room (calendar) to improve orientation and recall of written information, however unable to recall why certain things were written. Per pt report pt was independent at home with family support prior to admission and has since demonstrated increased confusion and disorientation in new environment.  Pt would benefit from a trial of speech therapy to enhance cognitive function with use of cognitive aides and space retrieval techniques in attempt to improve functional carry over and maximize independence at discharge. Plan  Frequency: 30 minutes/day; 5 days per week, as tolerated, until goals met, or discharged from ARU. Therapeutic Interventions: Cognitive-Linguistic intervention , Compensatory Cognitive intervention , and Patient/ Family education   Discharge Recommendations: TBD  pt [facility]  Continued SLP at Discharge: TBD based upon progress     Goals  Time Frame: 7-12 days     Pt will recall functional information (daily tasks, hip precautions, personal hx, etc.) with >70% accuracy with set up of cognitive aids. Patient will complete functional problem-solving tasks for daily situations with 80% accuracy, given min cues. Patient will be instructed in memory strategies to utilize in order to promote recall of newly learned information with >70% min cues. Therapy Session Time      Session 1   Time In 0930   Time Out 1009   Time Code Minutes    Individual Minutes 39     Timed Code Treatment Minutes: 0  Total Treatment Minutes: 44    Electronically Signed By:   Gayla JACKSON CCC-SLP #[id number] 1/16/2023 1:46 PM  Speech-Language Pathologist    The speech-language pathologist was present, directed the patient's care, made skilled judgment and was responsible for assessment and treatment.     Monico Turner,  Speech-Language Pathology

## 2023-01-16 NOTE — PROGRESS NOTES
[facility]   Phone: [phone]    Occupational Therapy    [] Initial Evaluation            [x] Daily Treatment Note         [] Discharge Summary      Patient: Philip Hammonds   : 1935   MRN: [id number]   Date of Service:  2023    Admitting Diagnosis:  Closed fracture of left femur, sequela  Current Admission Summary:   70-year-old male with a history of BPH, COPD, HTN, HLD, and dementia who was admitted on  with left hip pain after a fall. Work-up revealed a greater trochanter fracture of the left femur. Ortho evaluated and suggested 50% weightbearing on the left lower extremity. He was evaluated by therapy and suggested to continue in an inpatient setting prior to returning home. He was previously living independently at home with the assistance of family. His evening was complicated by increased confusion requiring the placement of a sitter. He remains pleasantly confused this morning. Past Medical History:  has a past medical history of Benign prostatic hypertrophy without urinary obstruction, Bradycardia, COPD (chronic obstructive pulmonary disease) ([address] Dementia ([address] Glaucoma, Gout, Hyperlipidemia, Hypertension, Hypoxemia, Macular degeneration disease, Osteoarthritis, Sleep apnea, and Unspecified sleep apnea. Past Surgical History:  has a past surgical history that includes Total hip arthroplasty (2005); joint replacement; TURP; knee surgery; and Colonoscopy.     Discharge Recommendations: Home with HHOT and assistance for IADLs     DME Required For Discharge: DME to be determined pending patient progress, rolling walker, shower chair with back    Precautions/Restrictions: high fall risk, weight bearing, ROM restrictions  Weight Bearing Restrictions: partial weight bearing - 50 %  [] Right Upper Extremity  [] Left Upper Extremity [] Right Lower Extremity  [x] Left Lower Extremity     Required Braces/Orthotics: no braces required   [] Right  [] Left  Positional Restrictions: no active hip abduction     Pre-Admission Information   Lives With: alone                     Type of Home: house  Home Layout: two level, basement with full flight  Home Access:  1 step to enter with handrail. Handrails are located on R side. Bathroom Layout: tub/shower unit, walk in shower--walk-in shower in basement; tub shower on main level. Pt typically uses walk-in shower downstairs. Bathroom Equipment:  no prior equipment  Toilet Height: standard height  Home Equipment: no prior equipment  Transfer Assistance: Independent without use of device  Ambulation Assistance:Independent without use of device  ADL Assistance: independent with all ADL's  IADL Assistance: independent with homemaking tasks--son fills medications; pt able to take them independently every morning. Active :        [x] Yes                 [] No  Hand Dominance: [] Left                 [x] Right  Current Employment: retired. Occupation: real estate  Hobbies: golf with son every weekend  Recent Falls: no recent falls. Note: Social functional information acquired from acute evaluation on 1/12. Pt has baseline dementia. Daughter states, Jaki Arellano is functional but forgetful\". She indicates his confusion has been exacerbated since his admission due to unfamiliar setting and events of hospital course. Subjective  General: Patient in bed upon arrival, agreeable to OT/PT session. Patient intermittently upset about his current functional status, comfort and reassurance provided. Frequent reorientation to hip fracture. Pain: 5/10. Location: L hip  Pain Interventions: pain medication in place prior to arrival and ice applied        Activities of Daily Living  Basic Activities of Daily Living  Grooming: setup assistance contact guard assistance  Grooming Comments: patient applied deodorant seated on BSC in shower.  Patient completed oral hygiene standing at sink with CGA and RW  Upper Extremity Bathing: stand by assistance requires verbal cueing  Lower Extremity Bathing: moderate assistance   Bathing Comments: patient completed shower seated on BS with HH shower head in walk in shower. SBA and cueing for UB. modA for lower legs. Patient required CGA for balance while in stance for rear periarea. Cues for hand placement and use of grab bars. Patient may benefit from [facility]    Upper Extremity Dressing: stand by assistance  Lower Extremity Dressing: moderate assistance maximum assistance  Dressing Comments: SBA for doffing/donning shirt. modA for doffing/donning pullup and pants. maxA for doffing/donning socks. Patient may benefit from AE   General Comments: Patient declining toileting. Instrumental Activities of Daily Living  No IADL completed on this date. Functional Mobility  Bed Mobility  Supine to Sit: stand by assistance  Scooting: stand by assistance  Comments:  Transfers  Sit to stand transfer:contact guard assistance  Stand to sit transfer: contact guard assistance  Shower transfer: contact guard assistance, minimal assistance  Shower transfer equipment: bedside commode, grab bars, walker  Shower transfer comments: Haydee progressing to [address] Cues for hand placement   Comments:  from EOB to RW, to/from [facility] in shower and wc. Verbal cues for upright posture    Functional Mobility:  Sitting Balance: stand by assistance. Sitting Balance Comment: on [facility] for shower  Standing Balance: contact guard assistance. Standing Balance Comment: with RW or grab bars   Functional Mobility: .  contact guard assistance  Functional Mobility Activity: to/from bathroom, 69 + 36 ft in hallway   Functional Mobility Device Use: rolling walker  Functional Mobility Comment: patient requires cueing for backing up prior to sitting.  Demonstrating improved tolerance for mobility      Other Therapeutic Interventions  Patient completed 5 ball taps to floor + overhead raise with red thera ball and 2 x10 cone reaching + passing back to therapist in order to improve balance and posture needed for ADLs and mobility. No LOB noted. CGA for balance. Second Session:  Patient in recliner upon arrival, agreeable to OT/PT session. Reporting 5/10 pain in L hip but able to participate well with rest breaks. Patient completed transfer from recliner to RW with CGA and ~20 ft of mobility with RW and CGA. Patient completed wc mobility into gym with SBA. Attempted to complete stairs. 50% weight bearing reviewed with patient and scale used to determine if patient is able to complete step up while maintaining 50%. Patient unable to complete without breaking precautions, most likely due to cognition/not understanding precautions. Car transfer completed with CGA and RW. Patient participated in dynamic balance activity with CGA. Patient completed 2x15 of ball toss and ball bounce with CGA and no AD. No LOB noted. Task completed to improve balance and activity tolerance needed for ADLs and mobility. Patient completed ~20 ft of mobility with RW and CGA. Patient with visitors in room at end of session. Ice applied to L hip. Patient left in recliner at end of session with chair alarm on, call button in reach, sitter present, and all needs met.         Cognition  Overall Cognitive Status: Impaired  Arousal/Alterness: appropriate responses to stimuli  Following Commands: follows one step commands consistently  Attention Span: difficulty dividing attention  Memory: decreased recall of precautions, decreased recall of recent events, decreased short term memory  Safety Judgement: decreased awareness of need for assistance, decreased awareness of need for safety  Problem Solving: assistance required to generate solutions, assistance required to implement solutions, decreased awareness of errors  Insights: decreased awareness of deficits  Initiation: requires cues for some  Sequencing: requires cues for some  Comments: baseline dementia but increased confusion during hospital stay   Orientation:    oriented to person, oriented to place, disoriented to time , and disoriented to situation  Command Following:   accurately follows one step commands     Education  Barriers To Learning: cognition  Patient Education: patient educated on goals, OT role and benefits, plan of care, precautions, ADL adaptive strategies, weight-bearing education, orientation, disease specific education, transfer training, discharge recommendations  Learning Assessment:  patient will require reinforcement due to cognitive deficits    Assessment  Activity Tolerance: patient tolerated well. Limited by pain. SpO2 98%, HR 73 bpm after walk. Impairments Requiring Therapeutic Intervention: decreased functional mobility, decreased ADL status, decreased strength, decreased safety awareness, decreased cognition, decreased endurance, decreased balance, decreased IADL, increased pain, decreased posture  Prognosis: good  Clinical Assessment: Patient presenting below baseline function secondary to left femur fracture. Patient typically independent with ADLs and mobility with no device. Patient progressing with transfers and ADLs, requiring assist of 1. Patient limited by pain and confusion but pleasant and agreeable to participate with therapy. Patient will benefit from education on AE. Patient will benefit from continued OT services to address above deficits and maximize safety and independence in ADLs and mobility in order to return home.    Safety Interventions: patient left in chair, chair alarm in place, call light within reach, patient at risk for falls, and sitter present    Plan  Frequency: 5 x/week, 90 min/day  Current Treatment Recommendations: strengthening, balance training, functional mobility training, transfer training, endurance training, patient/caregiver education, ADL/self-care training, IADL training, cognitive reorientation, safety education, and equipment evaluation/education    Goals  Patient Goals: to return to PLOF   Short Term Goals:  Time Frame: 2 weeks   Patient will complete upper body ADL at modified independent   Patient will complete lower body ADL at modified independent   Patient will complete toileting at modified independent   Patient will complete functional transfers at modified independent   Patient to gather and transport IADL items at modified independent     No goals met 1/16     Therapy Session Time     Individual Group Co-treatment   Time In    0830   Time Out    0930   Minutes    60     Second Session Therapy Time:   Individual Concurrent Group Co-treatment   Time In       1035   Time Out       1105   Minutes       30       Timed Code Treatment Minutes:  Timed Code Treatment Minutes: 60 Minutes + 30 minutes   Total Treatment Minutes:  90 minutes        Electronically Signed By: Irineo Noriega, [address] OTR/L [id number]

## 2023-01-17 PROCEDURE — 97530 THERAPEUTIC ACTIVITIES: CPT

## 2023-01-17 PROCEDURE — 6360000002 HC RX W HCPCS: Performed by: PHYSICAL MEDICINE & REHABILITATION

## 2023-01-17 PROCEDURE — 6370000000 HC RX 637 (ALT 250 FOR IP): Performed by: PHYSICAL MEDICINE & REHABILITATION

## 2023-01-17 PROCEDURE — 97535 SELF CARE MNGMENT TRAINING: CPT

## 2023-01-17 PROCEDURE — 97110 THERAPEUTIC EXERCISES: CPT

## 2023-01-17 PROCEDURE — 97116 GAIT TRAINING THERAPY: CPT

## 2023-01-17 PROCEDURE — 97129 THER IVNTJ 1ST 15 MIN: CPT

## 2023-01-17 PROCEDURE — 1280000000 HC REHAB R&B

## 2023-01-17 PROCEDURE — 97130 THER IVNTJ EA ADDL 15 MIN: CPT

## 2023-01-17 RX ADMIN — MEMANTINE 5 MG: 5 TABLET ORAL at 13:32

## 2023-01-17 RX ADMIN — ACETAMINOPHEN 1000 MG: 500 TABLET ORAL at 21:31

## 2023-01-17 RX ADMIN — OXYCODONE 5 MG: 5 TABLET ORAL at 23:11

## 2023-01-17 RX ADMIN — ACETAMINOPHEN 1000 MG: 500 TABLET ORAL at 14:29

## 2023-01-17 RX ADMIN — QUETIAPINE FUMARATE 25 MG: 25 TABLET ORAL at 13:29

## 2023-01-17 RX ADMIN — DIPHENHYDRAMINE HYDROCHLORIDE 25 MG: 25 TABLET ORAL at 21:31

## 2023-01-17 RX ADMIN — OXYCODONE 5 MG: 5 TABLET ORAL at 03:41

## 2023-01-17 RX ADMIN — OXYCODONE 5 MG: 5 TABLET ORAL at 18:35

## 2023-01-17 RX ADMIN — STANDARDIZED SENNA CONCENTRATE AND DOCUSATE SODIUM 2 TABLET: 8.6; 5 TABLET ORAL at 08:45

## 2023-01-17 RX ADMIN — ENOXAPARIN SODIUM 40 MG: 100 INJECTION SUBCUTANEOUS at 21:43

## 2023-01-17 RX ADMIN — MEMANTINE 5 MG: 5 TABLET ORAL at 21:32

## 2023-01-17 RX ADMIN — QUETIAPINE FUMARATE 25 MG: 25 TABLET ORAL at 03:41

## 2023-01-17 RX ADMIN — DONEPEZIL HYDROCHLORIDE 10 MG: 5 TABLET, FILM COATED ORAL at 21:31

## 2023-01-17 RX ADMIN — ACETAMINOPHEN 1000 MG: 500 TABLET ORAL at 07:45

## 2023-01-17 RX ADMIN — DORZOLAMIDE HYDROCHLORIDE 1 DROP: 20 SOLUTION/ DROPS OPHTHALMIC at 08:45

## 2023-01-17 RX ADMIN — DORZOLAMIDE HYDROCHLORIDE 1 DROP: 20 SOLUTION/ DROPS OPHTHALMIC at 21:41

## 2023-01-17 RX ADMIN — OXYCODONE 5 MG: 5 TABLET ORAL at 07:45

## 2023-01-17 RX ADMIN — TRAZODONE HYDROCHLORIDE 50 MG: 50 TABLET ORAL at 21:31

## 2023-01-17 RX ADMIN — OXYCODONE 5 MG: 5 TABLET ORAL at 13:38

## 2023-01-17 RX ADMIN — LISINOPRIL 5 MG: 5 TABLET ORAL at 08:45

## 2023-01-17 ASSESSMENT — PAIN SCALES - GENERAL
PAINLEVEL_OUTOF10: 6
PAINLEVEL_OUTOF10: 10
PAINLEVEL_OUTOF10: 3
PAINLEVEL_OUTOF10: 0
PAINLEVEL_OUTOF10: 8
PAINLEVEL_OUTOF10: 8
PAINLEVEL_OUTOF10: 0
PAINLEVEL_OUTOF10: 7
PAINLEVEL_OUTOF10: 6
PAINLEVEL_OUTOF10: 10

## 2023-01-17 ASSESSMENT — PAIN SCALES - WONG BAKER
WONGBAKER_NUMERICALRESPONSE: 6
WONGBAKER_NUMERICALRESPONSE: 8
WONGBAKER_NUMERICALRESPONSE: 2
WONGBAKER_NUMERICALRESPONSE: 2
WONGBAKER_NUMERICALRESPONSE: 0
WONGBAKER_NUMERICALRESPONSE: 2
WONGBAKER_NUMERICALRESPONSE: 2
WONGBAKER_NUMERICALRESPONSE: 0

## 2023-01-17 ASSESSMENT — PAIN DESCRIPTION - PAIN TYPE
TYPE: ACUTE PAIN

## 2023-01-17 ASSESSMENT — PAIN - FUNCTIONAL ASSESSMENT
PAIN_FUNCTIONAL_ASSESSMENT: ACTIVITIES ARE NOT PREVENTED
PAIN_FUNCTIONAL_ASSESSMENT: PREVENTS OR INTERFERES SOME ACTIVE ACTIVITIES AND ADLS
PAIN_FUNCTIONAL_ASSESSMENT: ACTIVITIES ARE NOT PREVENTED
PAIN_FUNCTIONAL_ASSESSMENT: PREVENTS OR INTERFERES SOME ACTIVE ACTIVITIES AND ADLS

## 2023-01-17 ASSESSMENT — PAIN DESCRIPTION - DESCRIPTORS
DESCRIPTORS: ACHING
DESCRIPTORS: OTHER (COMMENT)

## 2023-01-17 ASSESSMENT — PAIN DESCRIPTION - ORIENTATION
ORIENTATION: LEFT

## 2023-01-17 ASSESSMENT — PAIN DESCRIPTION - LOCATION
LOCATION: HIP
LOCATION: HIP
LOCATION: LEG
LOCATION: HIP

## 2023-01-17 ASSESSMENT — PAIN DESCRIPTION - ONSET
ONSET: ON-GOING

## 2023-01-17 ASSESSMENT — PAIN DESCRIPTION - FREQUENCY
FREQUENCY: CONTINUOUS

## 2023-01-17 NOTE — PROGRESS NOTES
[facility]   Phone: [phone]    Occupational Therapy    [] Initial Evaluation            [x] Daily Treatment Note         [] Discharge Summary      Patient: Rubin Nassar   : 1935   MRN: [id number]   Date of Service:  2023    Admitting Diagnosis:  Closed fracture of left femur, sequela  Current Admission Summary:   81-year-old male with a history of BPH, COPD, HTN, HLD, and dementia who was admitted on  with left hip pain after a fall. Work-up revealed a greater trochanter fracture of the left femur. Ortho evaluated and suggested 50% weightbearing on the left lower extremity. He was evaluated by therapy and suggested to continue in an inpatient setting prior to returning home. He was previously living independently at home with the assistance of family. His evening was complicated by increased confusion requiring the placement of a sitter. He remains pleasantly confused this morning. Past Medical History:  has a past medical history of Benign prostatic hypertrophy without urinary obstruction, Bradycardia, COPD (chronic obstructive pulmonary disease) ([address] Dementia ([address] Glaucoma, Gout, Hyperlipidemia, Hypertension, Hypoxemia, Macular degeneration disease, Osteoarthritis, Sleep apnea, and Unspecified sleep apnea. Past Surgical History:  has a past surgical history that includes Total hip arthroplasty (2005); joint replacement; TURP; knee surgery; and Colonoscopy.     Discharge Recommendations: Home with HHOT and assistance for IADLs     DME Required For Discharge: DME to be determined pending patient progress, rolling walker, shower chair with back    Precautions/Restrictions: high fall risk, weight bearing, ROM restrictions  Weight Bearing Restrictions: partial weight bearing - 50 %  [] Right Upper Extremity  [] Left Upper Extremity [] Right Lower Extremity  [x] Left Lower Extremity     Required Braces/Orthotics: no braces required   [] Right  [] Left  Positional Restrictions: no active hip abduction     Pre-Admission Information   Lives With: alone                     Type of Home: house  Home Layout: two level, basement with full flight  Home Access:  1 step to enter with handrail. Handrails are located on R side. Bathroom Layout: tub/shower unit, walk in shower--walk-in shower in basement; tub shower on main level. Pt typically uses walk-in shower downstairs. Bathroom Equipment:  no prior equipment  Toilet Height: standard height  Home Equipment: no prior equipment  Transfer Assistance: Independent without use of device  Ambulation Assistance:Independent without use of device  ADL Assistance: independent with all ADL's  IADL Assistance: independent with homemaking tasks--son fills medications; pt able to take them independently every morning. Active :        [x] Yes                 [] No  Hand Dominance: [] Left                 [x] Right  Current Employment: retired. Occupation: real estate  Hobbies: golf with son every weekend  Recent Falls: no recent falls. Note: Social functional information acquired from acute evaluation on 1/12. Pt has baseline dementia. Daughter states, Iliana Haney is functional but forgetful\". She indicates his confusion has been exacerbated since his admission due to unfamiliar setting and events of hospital course. Subjective  General: Pt asleep in recliner upon OT arrival with sitter present. Pt re-orientated to ARU and therapy recommendations d/t broken hip. Pt is calm however easily agitated by therapy session. Pt agreeable to therapy with encouragement.    Pain: Patient does not rate upon questioning  Pain Interventions: pain medication in place prior to arrival and repositioned       Activities of Daily Living  Basic Activities of Daily Living  Grooming: setup assistance contact guard assistance  Grooming Comments: in stance with bilateral or unilateral hand support, pt completed oral care   General Comments: Patient declining all other ADLs. Pt stated he would shave his face on 1/8/23   Instrumental Activities of Daily Living  Meal Prep: contact guard assistance. Meal Prep Comments: While in stance with bilateral or unilateral hand support, pt completed a three step simulated light meal prep where pt was provided with 3 step directions (1. Get cup 2. Fill with water 3. Place on R side of counter top) to improve activity tolerance, endurance, standing balance, and funcitonal reaching. Pt required max cues for sequencing. Pt frequently educated on the purpose of task. Pt is perseverating one wanting to return home. Functional Mobility  Bed Mobility  Bed mobility not completed on this date. Transfers  Sit to stand transfer:contact guard assistance  Stand to sit transfer: contact guard assistance  Comments:  from recliner to RW, to/from wheelchair  Functional Mobility:  Sitting Balance: stand by assistance. Sitting Balance Comment: in wheelchair  Standing Balance: contact guard assistance. Standing Balance Comment: with RW  or bilateral hand support on countertop   Functional Mobility: .  contact guard assistance  Functional Mobility Activity: to/from bathroom  Functional Mobility Device Use: rolling walker  Functional Mobility Comment: good hand placement, no cues for hand placement  Therapeutic Activity   While seated in wheelchair with 4# hand weights pt completed 2 sets of 10x: shoulder flexion/extension, shoulder abduction/adduction, horizontal abduction/adduction, elbow flexion/extension, supination/pronation, and wrist flexion/extension. Pt tolerated task fairly with no rest breaks and frequent reorientation to ARU and purpose of therapeutic exercise. Pt is not receptive to OT education. Second Session:  Patient in recliner upon arrival, agreeable to OT session with minimal encouragement. Patient's friend and daughter present at beginning of session.  Patient intermittently upset during session about current functional status and not being home. Reassurance and education provided. Patient agreeable and able to be re-directed. Patient reporting 10/10 pain in L hip. Pain medications given at beginning of session. Patient completed transfer from recliner to RW with CGA. Patient completed ~30 ft of mobility with RW and CGA. Patient participated in card game standing at elevated table in order to challenge standing tolerance, balance, attention to task and functional cognition. Patient able to complete 2 rounds of task- using BUE for task. Patient tolerated 3:15 and 3:30 of standing with seated rest break. Patient required CGA for balance, no LOB noted. Co-treat completed for second half of session in order to complete stairs. Patient completed 4 step ups to 4 inch step with B HR and modA of 1 and Haydee of 1. Max verbal and tactile cueing required in order to maintain 50% WB precautions. Scale used under LLE in order to ensure 50%. Patient completed ~60 ft of mobility with CGA and RW to bathroom. Patient completed clothing mgmt and toilet transfer ([address] overtop toilet) with CGA. Patient with slightly soiled brief from previous BM. Patient unable to void while on toilet. modA for doffing/donning pullup and pants. maxA for rear rupali care. CGA for balance. Patient able to pickup clothes on ground with RW and Haydee. Patient left in recliner with chair alarm on, call button in reach, sitter present and all needs met.        Cognition  Overall Cognitive Status: Impaired  Arousal/Alterness: appropriate responses to stimuli  Following Commands: follows one step commands consistently  Attention Span: difficulty dividing attention  Memory: decreased recall of precautions, decreased recall of recent events, decreased short term memory  Safety Judgement: decreased awareness of need for assistance, decreased awareness of need for safety  Problem Solving: assistance required to generate solutions, assistance required to implement solutions, decreased awareness of errors  Insights: decreased awareness of deficits  Initiation: requires cues for some  Sequencing: requires cues for some  Comments: baseline dementia but increased confusion during hospital stay   drowsy  Orientation:    oriented to person, disoriented to place, disoriented to time , and disoriented to situation  Command Following:   accurately follows one step commands     Education  Barriers To Learning: cognition  Patient Education: patient educated on goals, OT role and benefits, plan of care, precautions, ADL adaptive strategies, weight-bearing education, orientation, disease specific education, transfer training, discharge recommendations  Learning Assessment:  patient will require reinforcement due to cognitive deficits  Assessment  Activity Tolerance: fairly d/t agitation. Patient able to tolerate more second session but continues to be limited by cognition and pain   Impairments Requiring Therapeutic Intervention: decreased functional mobility, decreased ADL status, decreased strength, decreased safety awareness, decreased cognition, decreased endurance, decreased balance, decreased IADL, increased pain, decreased posture  Prognosis: good  Clinical Assessment: Pt requires CGA for functional transfers and simulated meal prep tasks with max verbal cues for sequencing, frequent re-orientation, and education for purpose of therapeutic activity. Patient continues to require assistance for LB ADLs. Patient limited by pain and confusion but agreeable to participate with therapy. Continue POC.   Safety Interventions: patient left in chair, chair alarm in place, call light within reach, patient at risk for falls, and sitter present    Plan  Frequency: 5 x/week, 90 min/day  Current Treatment Recommendations: strengthening, balance training, functional mobility training, transfer training, endurance training, patient/caregiver education, ADL/self-care training, IADL training, cognitive reorientation, safety education, and equipment evaluation/education  Goals  Patient Goals: to return to PLOF   Short Term Goals:  Time Frame: 2 weeks   Patient will complete upper body ADL at modified independent   Patient will complete lower body ADL at modified independent   Patient will complete toileting at modified independent   Patient will complete functional transfers at modified independent   Patient to gather and transport IADL items at modified independent     No goals met 1/17    Therapy Session Time     Individual Group Co-treatment   Time In  945     Time Out  1015     Minutes  30       Second Session Therapy Time:   Individual Concurrent Group Co-treatment   Time In  1330     1400   Time Out  1400     1430   Minutes  30     30       Timed Code Treatment Minutes:  30 + 60     Total Treatment Minutes:  90 minutes        First session: Electronically Signed By: DOUG Collazo, OTR/L, [id number]   Second session: Hank Macario, [address] [place] [address] OTR/L [id number]

## 2023-01-17 NOTE — PROGRESS NOTES
[facility]   Phone: [phone]    Speech Therapy    [] Initial Evaluation            [x] Daily Treatment Note         [] Discharge Summary      Patient: Jessica Smith   : 1935   MRN: [id number]   Date of Service:  2023  Admitting Diagnosis: Closed fracture of left femur, sequela  Current Admission Summary: 46-year-old male with a history of BPH, COPD, HTN, HLD, and dementia who was admitted on  with left hip pain after a fall. Work-up revealed a greater trochanter fracture of the left femur. Ortho evaluated and suggested 50% weightbearing on the left lower extremity. He was evaluated by therapy and suggested to continue in an inpatient setting prior to returning home. He was previously living independently at home with the assistance of family. His evening was complicated by increased confusion requiring the placement of a sitter. He remains pleasantly confused this morning. Past Medical History:  has a past medical history of Benign prostatic hypertrophy without urinary obstruction, Bradycardia, COPD (chronic obstructive pulmonary disease) ([address] Dementia ([address] Glaucoma, Gout, Hyperlipidemia, Hypertension, Hypoxemia, Macular degeneration disease, Osteoarthritis, Sleep apnea, and Unspecified sleep apnea. Past Surgical History:  has a past surgical history that includes Total hip arthroplasty (2005); joint replacement; TURP; knee surgery; and Colonoscopy.   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 Pt more agitated and less engaged than previous day and closed eyes during most of session. Pt sleeping upon first attempt  during second attempt pt's daughter was present for most of session. Unsure if pt not responding purposefully to SLP as he opened eyes and talked to daughter when SLP stepped out. Safety Interventions: patient left in chair, chair alarm in place, call light within reach, patient at risk for falls, telesitter in use, and sitter present      Therapeutic Interventions:     Session 1:   Cognition: Severity: Moderate  and Severe   Orientation:  - Pt oriented to location with f/2  - Pt identified he was here because of his hip; reported he was unsure what happened but was able to say \"yes\" when asked if he fell  - Pt said it was December; provided with min verbal cue pt able to state correct month   - Pt able to name [facility]; disoriented to floor/unit in hospital (rehab/ reasoning for being on rehab)  - Pt able to identify daughter Puja Hastings who entered room   - Provided pt with monthly calendar and wrote admission dates into hospital and rehab     Additional Interventions: Personal Information/ PLOF (obtained from pt's daughter)   - Daughter reported he was given pain medicine that makes him more tired than usual. Stated he had a better day previous date but that he had mentioned to her that he wanted to die and that he was not going to take his medication. Pt has not been eating well. Daughter requesting Ensures, notified dietician. Daughter brought outside food into pt ([place]). Pt stated he was excited to eat but did not open eyes or show signs he was excited, suspect due to fatigue.  Pt's daughter provided PLOF for pt (was driving short familiar distances - going to [place] everyday, handling meds/ with daughter assisting with finances). Pt's daughter reports ongoing decline of memory/ fx.   Provided extensive education to pt's daughter re: reason/ rationale for speech therapy. Education  Barriers To Learning: cognition  Patient Education: Provided education regarding role of SLP, results of assessment, recommendations and general speech pathology plan of care. Learning Assessment: Pt verbalized understanding   Pt requires ongoing learning     Assessment  Impairments Requiring Therapeutic Intervention: Cognitive-Linguistic Deficits   Prognosis: fair (baseline dementia)    Clinical Assessment: Pt continues to present with Moderate  and Severe  Cognitive-Linguistic Deficits . Difficulty/limitations persist with short term memory and fx carryover recalling what happened and where he is. Pt with intermittent agitation and willingness to participate. If pt continues to show increased agitation and does not wish to participate will d/c from speech therapy caseload. Continue to target fx short term memory and problem solving to facilitate safe return to prior level of independence. Plan  Frequency: 30 minutes/day; 5 days per week, as tolerated, until goals met, or discharged from ARU. Therapeutic Interventions: Cognitive-Linguistic intervention , Compensatory Cognitive intervention , and Patient/ Family education   Discharge Recommendations: TBD  [facility]  Continued SLP at Discharge: TBD based upon progress     Goals  Time Frame: 7-12 days     Pt will recall functional information (daily tasks, hip precautions, personal hx, etc.) with >70% accuracy with set up of cognitive aids. ongoing  Patient will complete functional problem-solving tasks for daily situations with 80% accuracy, given min cues. ongoing  Patient will be instructed in memory strategies to utilize in order to promote recall of newly learned information with >70% min cues. ongoing      Therapy Session Time      Session 1   Time In 0235   Time Out 0305   Time Code Minutes 30   Individual Minutes 30     Timed Code Treatment Minutes: 30  Total Treatment Minutes: 30    Electronically Signed By:   Ashtyn JACKSON [facility]-SLP #[id number] 1/17/2023 3:09 PM  Speech-Language Pathologist    The speech-language pathologist was present, directed the patient's care, made skilled judgment and was responsible for assessment and treatment.     Lit Busby,  Speech-Language Pathology

## 2023-01-17 NOTE — PLAN OF CARE
Problem: Discharge Planning  Goal: Discharge to home or other facility with appropriate resources  Outcome: Progressing  Flowsheets (Taken 1/16/2023 2247)  Discharge to home or other facility with appropriate resources: Identify barriers to discharge with patient and caregiver     Problem: Pain  Goal: Verbalizes/displays adequate comfort level or baseline comfort level  Outcome: Progressing  Flowsheets (Taken 1/16/2023 2247)  Verbalizes/displays adequate comfort level or baseline comfort level: Encourage patient to monitor pain and request assistance     Problem: Skin/Tissue Integrity  Goal: Absence of new skin breakdown  Description: 1. Monitor for areas of redness and/or skin breakdown  2. Assess vascular access sites hourly  3. Every 4-6 hours minimum:  Change oxygen saturation probe site  4. Every 4-6 hours:  If on nasal continuous positive airway pressure, respiratory therapy assess nares and determine need for appliance change or resting period.   Outcome: Progressing     Problem: Safety - Adult  Goal: Free from fall injury  Outcome: Progressing  Flowsheets (Taken 1/17/2023 0648)  Free From Fall Injury: Instruct family/caregiver on patient safety     Problem: ABCDS Injury Assessment  Goal: Absence of physical injury  Outcome: Progressing  Flowsheets (Taken 1/17/2023 0648)  Absence of Physical Injury: Implement safety measures based on patient assessment

## 2023-01-17 NOTE — PROGRESS NOTES
Assessment complete. Alert. Oriented to self only. Complaining of hip pain. Refused medications at first, reporting nausea. After a few minutes, patient began complaining of pain, and was agreeable to taking pain medications only (refused all other oral medications). The care plan and education has been reviewed and mutually agreed upon with the patient. In bed, alarm on, bed in lowest position, call light and table within reach, camera in place, sitter at bedside. No further needs expressed at this time. 0400  Patient woke up, reporting urge to void to sitter. Patient became agitated, verbally aggressive, and was swinging his arms at staff. Pain medication had been given almost four hours prior when patient began complaining of pain. When available, pain medication offered. Patient refused. Wanting this RN and sitter to leave him alone. Patient wanting to return home. Several unsuccessful attempts made to reorient and reason with patient. Assisted up to chair per patient request. Eventually, patient agreeable to take medications, only if he could be left alone. Given PRN oxycodone for pain and PRN Seroquel for agitation. This RN left room. Sitter watching patient from outside the room. 0430  Patient sleeping in chair with respirations greater than 10 per minute.    0600  Sitter back at bedside, helped patient return to bed after sleeping. Alarm engaged.

## 2023-01-17 NOTE — PLAN OF CARE
Problem: Discharge Planning  Goal: Discharge to home or other facility with appropriate resources  1/17/2023 1639 by Abran Shafer RN  Outcome: Progressing  Flowsheets (Taken 1/17/2023 0850)  Discharge to home or other facility with appropriate resources: Identify barriers to discharge with patient and caregiver  1/17/2023 0650 by Irish Pack RN  Outcome: Progressing  Flowsheets (Taken 1/16/2023 2247)  Discharge to home or other facility with appropriate resources: Identify barriers to discharge with patient and caregiver     Problem: Pain  Goal: Verbalizes/displays adequate comfort level or baseline comfort level  1/17/2023 1639 by Abran Shafer RN  Outcome: Progressing  1/17/2023 0650 by Irish Pack RN  Outcome: Progressing  Flowsheets (Taken 1/16/2023 2247)  Verbalizes/displays adequate comfort level or baseline comfort level: Encourage patient to monitor pain and request assistance     Problem: Skin/Tissue Integrity  Goal: Absence of new skin breakdown  Description: 1. Monitor for areas of redness and/or skin breakdown  2. Assess vascular access sites hourly  3. Every 4-6 hours minimum:  Change oxygen saturation probe site  4. Every 4-6 hours:  If on nasal continuous positive airway pressure, respiratory therapy assess nares and determine need for appliance change or resting period.   1/17/2023 1639 by Abran Shafer RN  Outcome: Progressing  1/17/2023 0650 by Irish Pack RN  Outcome: Progressing     Problem: Safety - Adult  Goal: Free from fall injury  1/17/2023 1639 by Abran Shafer RN  Outcome: Progressing  1/17/2023 0650 by Irish Pack RN  Outcome: Progressing  Flowsheets (Taken 1/17/2023 0648)  Free From Fall Injury: Lupeleanne Mu family/caregiver on patient safety     Problem: ABCDS Injury Assessment  Goal: Absence of physical injury  1/17/2023 1639 by Abran Shafer RN  Outcome: Progressing  1/17/2023 0650 by Irish Pack RN  Outcome: Progressing  Flowsheets (Taken 1/17/2023 8125)  Absence of Physical Injury: Implement safety measures based on patient assessment

## 2023-01-17 NOTE — PROGRESS NOTES
[facility] [address] Department   Phone: [phone]    Physical Therapy    [] Initial Evaluation            [x] Daily Treatment Note         [] Discharge Summary      Patient: Trevin Cortes   : 1935   MRN: [id number]   Date of Service:  2023  Admitting Diagnosis: Closed fracture of left femur, sequela  Current Admission Summary: 80-year-old male with a history of BPH, COPD, HTN, HLD, and dementia who was admitted on  with left hip pain after a fall. Work-up revealed a greater trochanter fracture of the left femur. Ortho evaluated and suggested 50% weightbearing on the left lower extremity. He was evaluated by therapy and suggested to continue in an inpatient setting prior to returning home. He was previously living independently at home with the assistance of family. His evening was complicated by increased confusion requiring the placement of a sitter. He remains pleasantly confused this morning. Past Medical History:  has a past medical history of Benign prostatic hypertrophy without urinary obstruction, Bradycardia, COPD (chronic obstructive pulmonary disease) ([address] Dementia ([address] Glaucoma, Gout, Hyperlipidemia, Hypertension, Hypoxemia, Macular degeneration disease, Osteoarthritis, Sleep apnea, and Unspecified sleep apnea. Past Surgical History:  has a past surgical history that includes Total hip arthroplasty (2005); joint replacement; TURP; knee surgery; and Colonoscopy.   Discharge Recommendations: Home with Home Health Physical Therapy, Supervision level pending cognitive progress  DME Required For Discharge: rolling walker  Precautions/Restrictions: high fall risk, weight bearing, ROM restrictions  Weight Bearing Restrictions: partial weight bearing - 50 %  [] Right Upper Extremity  [] Left Upper Extremity [] Right Lower Extremity  [x] Left Lower Extremity     Required Braces/Orthotics: no braces required   [] Right  [] Left  Positional Restrictions:No Active Hip ABduction    Pre-Admission Information    ** Pre-admission information gained from family members in acute care setting **  Lives With: alone                     Type of Home: house  Home Layout: two level, basement with full flight  Home Access:  1 step to enter with handrail. Handrails are located on R side. Bathroom Layout: tub/shower unit, walk in shower--walk-in shower in basement; tub shower on main level. Pt typically uses walk-in shower downstairs. Bathroom Equipment:  no prior equipment  Toilet Height: standard height  Home Equipment: no prior equipment  Transfer Assistance: Independent without use of device  Ambulation Assistance:Independent without use of device  ADL Assistance: independent with all ADL's  IADL Assistance: independent with homemaking tasks--son fills medications; pt able to take them independently every morning. Active :        [x] Yes                 [] No  Hand Dominance: [] Left                 [x] Right  Current Employment: retired. Occupation: real estate  Hobbies: golf with son every weekend  Recent Falls: no recent falls. Examination   Vision:   Vision Corrective Device: wears glasses for reading  Hearing:   [employer] [facility] [employer]      Subjective  General: Patient supine in bed upon arrival with sitter present. Pt remains disoriented to location and situation with no recall of injury, and unable retain situation within session despite frequent education and orientation. In addition to continued cognitive deficits, patient presenting with increased agitation within session as a result of attempted therapy interventions. Pain: 10/10. Location: (L) hip  Pain Interventions: RN provided pain medication per patient request       Functional Mobility  Bed Mobility  Supine to Sit: stand by assistance  Scooting: stand by assistance  Comments: Increased time and encouragement with HOB elevated.    Transfers  Sit to stand transfer: minimal assistance, (ranges from CGA/[place] within session with increased posterior lean during transition)  Stand to sit transfer: contact guard assistance  Stand step transfer: minimal assistance  Car transfer: moderate assistance, (increased posterior lean while standing from car surface)  Comments: All performed with use of RW. Ambulation  Surface:level surface  Assistive Device: rolling walker  Assistance: contact guard assistance  Distance: 80' + 15' + 15\"  Gait Mechanics: Step-to pattern with mild forward flexed posture, increased UE reliance, and small step length  Comments:  Frequent VC/TC for promoting upright posture   Stair Mobility  Stair mobility not completed on this date. Comments:  Wheelchair Mobility:   No w/c mobility completed on this date. Comments:  Balance  Static Sitting Balance: fair (+): maintains balance at SBA/supervision without use of UE support  Dynamic Sitting Balance: fair (+): maintains balance at SBA/supervision without use of UE support  Static Standing Balance: fair: maintains balance at CGA without use of UE support  Dynamic Standing Balance: fair (-): maintains balance at CGA with use of UE support  Comments:    Other Therapeutic Interventions   First session:   See functional mobility above. In addition patient completes seated (B) LE therex without weights 1 x 10 ea: Hip Adduction with ball squeeze, LAQ. Seated passive hamstring stretch completed 3 x 30 with progressive increased in LE ROM in preparation for ambulation. Standing (L) LE therex completed with (B) UE support on step HR 1 x 10 ea: 6\" toe taps, hip extension, (B) LE heel raises. Second session:     Patient performing standing task with OT upon entry, agreeable to therapy. Patient performed all transfers this session at [facility] with use of RW. He continues to demonstrate posterior lean with transfers.  Stair navigation (4 x 4 in step with scale under (L) LE) performed at min A of 1 + mod A of 1 with max VC for using (B) UE on HR to [place] (L) LE in order to maintain WB precautions. In addition, patient ambulated 61' at [facility] with RW. Toilet transfer performed at end of session at [facility] with use of RW. Pericare and LE clothing management assisted by OT at [facility]. See OT note for further details. Object retrieval from floor with use of RW performed at min A. Patient left in recliner with chair alarm in place and call light within reach. Cognition  Overall Cognitive Status: Impaired  Arousal/Alterness: appropriate responses to stimuli  Following Commands: follows one step commands consistently  Attention Span: attends with cues to redirect, difficulty dividing attention  Memory: decreased recall of precautions, decreased short term memory, decreased long term memory  Safety Judgement: decreased awareness of need for assistance, decreased awareness of need for safety  Problem Solving: assistance required to generate solutions, assistance required to implement solutions, decreased awareness of errors  Insights: decreased awareness of deficits  Initiation: requires cues for some  Sequencing: requires cues for some  Comments: Increased impulsivity with frequent VC for reorientation to surroundings. Patient carryover within conversation/directions less than 1 minute before requiring repetition including recall of hip fracture.   Orientation:    oriented to person, disoriented to place, disoriented to time , and disoriented to situation  Command Following:   impaired    Education  Barriers To Learning: cognition  Patient Education: patient educated on goals, PT role and benefits, plan of care, precautions, weight-bearing education, general safety, functional mobility training, proper use of assistive device/equipment, orientation, transfer training  Learning Assessment:  patient will require reinforcement due to cognitive deficits    Assessment  Activity Tolerance: Limited by severe pain and cognitive deficits with no carryover of education/situation. Impairments Requiring Therapeutic Intervention: decreased functional mobility, decreased ADL status, decreased ROM, decreased strength, decreased safety awareness, decreased cognition, decreased endurance, decreased balance, increased pain, decreased posture  Prognosis: fair  Clinical Assessment: Patient functional mobility continues to be completed with one caregiver assist on this date but continues to be significantly limited by severe pain and cognitive deficits. Patient unable to recall injury despite max education resulting in frequent redirection and reorientation to participate in therapy session. Patient able to navigate 4 in steps during second session with therapist assist x 2 showing improvements in mobility. Patient would continue to benefit from skilled therapy services in order to promote functional independence and improved mobility. Safety Interventions: patient left in chair, chair alarm in place, call light within reach, gait belt, telesitter in use, and sitter present    Plan  Frequency: 5 x/week, 75 min/day  Current Treatment Recommendations: strengthening, ROM, balance training, functional mobility training, transfer training, gait training, stair training, endurance training, neuromuscular re-education, modalities, patient/caregiver education, ADL/self-care training, cognitive reorientation, pain management, and safety education    Goals  Patient Goals:  To return home   Short Term Goals:  Time Frame: 2 weeks  Patient will complete bed mobility at modified independent   Patient will complete transfers at [facility]   Patient will ambulate 150 ft with use of rolling walker at modified independent  Patient will ascend/descend 4 stairs with (R) ascending handrail at modified independent  Patient will complete car transfer at [facility]    Therapy Session Time  First Session Therapy Time:   Individual Concurrent Group Co-treatment   Time In  0730 Time Out  0828         Minutes  62           Second Session Therapy Time:   Individual Concurrent Group Co-treatment   Time In        1400   Time Out        1430   Minutes        30     Timed Code Treatment Minutes:  58 + 30    Total Treatment Minutes:   88 minutes      Electronically Signed By:     WENDY Calle  Therapist observed and directed the patient's plan of care.   Co-signed and supervised by: Maeve Ernandez PT, DPT - [id number]

## 2023-01-17 NOTE — PROGRESS NOTES
Boy Cormier  1/17/2023  [id number]    Chief Complaint: Closed fracture of left femur, sequela    Subjective:   No overnight events. No current complaints. BP elevated yesterday but improved this AM.    ROS: No CP, SOB, dyspnea    Objective:  Patient Vitals for the past 24 hrs:   BP Temp Temp src Pulse Resp SpO2   01/17/23 0840 121/69 98.6 °F (37 °C) Oral 62 18 95 %   01/16/23 2247 (!) 149/87 97.5 °F (36.4 °C) Oral 54 18 94 %       Gen: No distress, pleasant. Resting in bedside chair  HEENT: Normocephalic, atraumatic. CV: Regular rate and rhythm. No MRG   Resp: No respiratory distress. CTAB   Abd: Soft, nontender, nondistended  Ext: No edema. Neuro: Alert, oriented, appropriately interactive. Higher level cognitive deficits present    Laboratory data: Available via EMR. Therapy progress:    PT    Supine to Sit: Supervision or touching assistance  Sit to Supine:     Sit to Stand: Supervision or touching assistance  Chair/Bed to Chair Transfer: Supervision or touching assistance  Car Transfer: Supervision or touching assistance  Ambulation 10 ft: Supervision or touching assistance  Ambulation 50 ft: Supervision or touching assistance  Ambulation 150 ft:    Stairs - 1 Step: Supervision or touching assistance  Stairs - 4 Step:    Stairs - 12 Step:      OT    Eating: Setup or clean-up assistance  Oral Hygiene: Supervision or touching assistance  Bathing: Partial/moderate assistance  Upper Body Dressing: Supervision or touching assistance  Lower Body Dressing: Partial/moderate assistance  Toilet Transfer: Partial/moderate assistance  Toilet Hygiene: Partial/moderate assistance    Speech Therapy    Pt presents with moderate to severe short term memory deficits and reduced carry over of newly learned information impacting his cognitive flexibility and problem solving.  During evaluation, pt looked to cognitive aid in room (calendar) to improve orientation and recall of written information, however unable to recall why certain things were written. Per pt report pt was independent at home with family support prior to admission and has since demonstrated increased confusion and disorientation in new environment. Pt would benefit from a trial of speech therapy to enhance cognitive function with use of cognitive aides and space retrieval techniques in attempt to improve functional carry over and maximize independence at discharge. Body mass index is 29.23 kg/m². Assessment:  Patient Active Problem List   Diagnosis    Benign prostatic hyperplasia without urinary obstruction    Mixed hyperlipidemia    Bradycardia    Primary hypertension    Pre-diabetes    Primary osteoarthritis of right knee    Memory impairment    Nipple soreness    Skin lesion on examination    Late onset Alzheimer's dementia without behavioral disturbance (HCC)    Vitamin D insufficiency    Nondisplaced fracture of greater trochanter of left femur, initial encounter for closed fracture (HCC)    Unable to ambulate    Alzheimer's dementia ([facility]    Imbalance    Ataxia    Closed fracture of left femur, sequela       Plan:   Left greater trochanter femur fracture: 50% WBAT in LLE. PT/OT. Pain control. Dementia: resumed namenda and aricept per home regimen. Seroquel 25 as needed     HTN: hold lisinopril 10 held. Resumed @ 5 mg        Bowels: Per protocol  Bladder: Per protocol   Sleep: Trazodone provided prn. Pain: tylenol scheduled, kelly PRN 5mg  DVT PPx: lovenox   ARSENIO: 1/26    Team conference was held today on the patient and discussed directly with the patient utilizing their entire treatment team. Please see separate team note for details. Total treatment time for today's care >35 min. Joel Jackson MD 1/17/2023, 8:52 AM    * This document was created using dictation software. While all precautions were taken to ensure accuracy, errors may have occurred. Please disregard any typographical errors.

## 2023-01-18 PROCEDURE — 1280000000 HC REHAB R&B

## 2023-01-18 PROCEDURE — 97530 THERAPEUTIC ACTIVITIES: CPT

## 2023-01-18 PROCEDURE — 97535 SELF CARE MNGMENT TRAINING: CPT

## 2023-01-18 PROCEDURE — 97130 THER IVNTJ EA ADDL 15 MIN: CPT

## 2023-01-18 PROCEDURE — 97112 NEUROMUSCULAR REEDUCATION: CPT

## 2023-01-18 PROCEDURE — 97116 GAIT TRAINING THERAPY: CPT

## 2023-01-18 PROCEDURE — 6370000000 HC RX 637 (ALT 250 FOR IP): Performed by: PHYSICAL MEDICINE & REHABILITATION

## 2023-01-18 PROCEDURE — 6360000002 HC RX W HCPCS: Performed by: PHYSICAL MEDICINE & REHABILITATION

## 2023-01-18 PROCEDURE — 97129 THER IVNTJ 1ST 15 MIN: CPT

## 2023-01-18 RX ADMIN — ACETAMINOPHEN 1000 MG: 500 TABLET ORAL at 08:03

## 2023-01-18 RX ADMIN — LISINOPRIL 5 MG: 5 TABLET ORAL at 08:03

## 2023-01-18 RX ADMIN — MEMANTINE 5 MG: 5 TABLET ORAL at 21:54

## 2023-01-18 RX ADMIN — OXYCODONE 5 MG: 5 TABLET ORAL at 21:54

## 2023-01-18 RX ADMIN — QUETIAPINE FUMARATE 25 MG: 25 TABLET ORAL at 21:53

## 2023-01-18 RX ADMIN — ACETAMINOPHEN 1000 MG: 500 TABLET ORAL at 15:00

## 2023-01-18 RX ADMIN — OXYCODONE 5 MG: 5 TABLET ORAL at 06:58

## 2023-01-18 RX ADMIN — STANDARDIZED SENNA CONCENTRATE AND DOCUSATE SODIUM 2 TABLET: 8.6; 5 TABLET ORAL at 08:03

## 2023-01-18 RX ADMIN — DONEPEZIL HYDROCHLORIDE 10 MG: 5 TABLET, FILM COATED ORAL at 21:54

## 2023-01-18 RX ADMIN — TRAZODONE HYDROCHLORIDE 50 MG: 50 TABLET ORAL at 21:54

## 2023-01-18 RX ADMIN — OXYCODONE 5 MG: 5 TABLET ORAL at 03:08

## 2023-01-18 RX ADMIN — DORZOLAMIDE HYDROCHLORIDE 1 DROP: 20 SOLUTION/ DROPS OPHTHALMIC at 22:00

## 2023-01-18 RX ADMIN — ENOXAPARIN SODIUM 40 MG: 100 INJECTION SUBCUTANEOUS at 21:56

## 2023-01-18 RX ADMIN — ACETAMINOPHEN 1000 MG: 500 TABLET ORAL at 21:53

## 2023-01-18 RX ADMIN — MEMANTINE 5 MG: 5 TABLET ORAL at 10:08

## 2023-01-18 RX ADMIN — STANDARDIZED SENNA CONCENTRATE AND DOCUSATE SODIUM 2 TABLET: 8.6; 5 TABLET ORAL at 21:54

## 2023-01-18 RX ADMIN — DORZOLAMIDE HYDROCHLORIDE 1 DROP: 20 SOLUTION/ DROPS OPHTHALMIC at 08:05

## 2023-01-18 ASSESSMENT — PAIN SCALES - WONG BAKER
WONGBAKER_NUMERICALRESPONSE: 2
WONGBAKER_NUMERICALRESPONSE: 0
WONGBAKER_NUMERICALRESPONSE: 2
WONGBAKER_NUMERICALRESPONSE: 2

## 2023-01-18 ASSESSMENT — PAIN SCALES - GENERAL
PAINLEVEL_OUTOF10: 6
PAINLEVEL_OUTOF10: 4
PAINLEVEL_OUTOF10: 4
PAINLEVEL_OUTOF10: 6
PAINLEVEL_OUTOF10: 6
PAINLEVEL_OUTOF10: 5
PAINLEVEL_OUTOF10: 3

## 2023-01-18 ASSESSMENT — PAIN DESCRIPTION - FREQUENCY: FREQUENCY: CONTINUOUS

## 2023-01-18 ASSESSMENT — PAIN DESCRIPTION - LOCATION
LOCATION: LEG
LOCATION: HIP
LOCATION: LEG
LOCATION: LEG

## 2023-01-18 ASSESSMENT — PAIN DESCRIPTION - DESCRIPTORS
DESCRIPTORS: ACHING

## 2023-01-18 ASSESSMENT — PAIN - FUNCTIONAL ASSESSMENT
PAIN_FUNCTIONAL_ASSESSMENT: ACTIVITIES ARE NOT PREVENTED
PAIN_FUNCTIONAL_ASSESSMENT: ACTIVITIES ARE NOT PREVENTED

## 2023-01-18 ASSESSMENT — PAIN DESCRIPTION - ORIENTATION
ORIENTATION: LEFT

## 2023-01-18 ASSESSMENT — PAIN DESCRIPTION - ONSET
ONSET: ON-GOING
ONSET: ON-GOING

## 2023-01-18 ASSESSMENT — PAIN DESCRIPTION - PAIN TYPE
TYPE: ACUTE PAIN
TYPE: ACUTE PAIN

## 2023-01-18 NOTE — PROGRESS NOTES
Dev Colón  1/18/2023  [id number]    Chief Complaint: Closed fracture of left femur, sequela    Subjective:   No overnight events. No current complaints. BP labile. Still frustrated by being here but improved agitation this am.     ROS: No CP, SOB, dyspnea    Objective:  Patient Vitals for the past 24 hrs:   BP Temp Temp src Pulse Resp SpO2   01/18/23 0757 (!) 161/79 98 °F (36.7 °C) Oral 59 18 94 %   01/17/23 2115 (!) 162/82 98.6 °F (37 °C) Oral 64 18 98 %   01/17/23 0840 121/69 98.6 °F (37 °C) Oral 62 18 95 %       Gen: No distress, pleasant. Resting in bed  HEENT: Normocephalic, atraumatic. CV: Regular rate and rhythm. No MRG   Resp: No respiratory distress. CTAB   Abd: Soft, nontender, nondistended  Ext: No edema. Neuro: Alert, oriented, appropriately interactive. Higher level cognitive deficits present    Laboratory data: Available via EMR. Therapy progress:    PT    Supine to Sit: Supervision or touching assistance  Sit to Supine:     Sit to Stand: Partial/moderate assistance  Chair/Bed to Chair Transfer: Partial/moderate assistance  Car Transfer: Partial/moderate assistance  Ambulation 10 ft: Supervision or touching assistance  Ambulation 50 ft: Supervision or touching assistance  Ambulation 150 ft:    Stairs - 1 Step: Dependent  Stairs - 4 Step: Dependent  Stairs - 12 Step:      OT    Eating: Setup or clean-up assistance  Oral Hygiene: Supervision or touching assistance  Bathing: Partial/moderate assistance  Upper Body Dressing: Supervision or touching assistance  Lower Body Dressing: Partial/moderate assistance  Toilet Transfer: Supervision or touching assistance  Toilet Hygiene: Substantial/maximal assistance    Speech Therapy    Pt presents with moderate to severe short term memory deficits and reduced carry over of newly learned information impacting his cognitive flexibility and problem solving.  During evaluation, pt looked to cognitive aid in room (calendar) to improve orientation and recall of written information, however unable to recall why certain things were written. Per pt report pt was independent at home with family support prior to admission and has since demonstrated increased confusion and disorientation in new environment. Pt would benefit from a trial of speech therapy to enhance cognitive function with use of cognitive aides and space retrieval techniques in attempt to improve functional carry over and maximize independence at discharge. Body mass index is 29.23 kg/m². Assessment:  Patient Active Problem List   Diagnosis    Benign prostatic hyperplasia without urinary obstruction    Mixed hyperlipidemia    Bradycardia    Primary hypertension    Pre-diabetes    Primary osteoarthritis of right knee    Memory impairment    Nipple soreness    Skin lesion on examination    Late onset Alzheimer's dementia without behavioral disturbance (HCC)    Vitamin D insufficiency    Nondisplaced fracture of greater trochanter of left femur, initial encounter for closed fracture (HCC)    Unable to ambulate    Alzheimer's dementia ([facility]    Imbalance    Ataxia    Closed fracture of left femur, sequela       Plan:   Left greater trochanter femur fracture: 50% WBAT in LLE. PT/OT. Pain control. Dementia: resumed namenda and aricept per home regimen. Seroquel 25 as needed     HTN: hold lisinopril 10 held. Resumed @ 5 mg        Bowels: Per protocol  Bladder: Per protocol   Sleep: Trazodone provided prn. Pain: tylenol scheduled, kelly PRN 5mg  DVT PPx: lovenox   ARSENIO: 1/26    Harsh Gastelum MD 1/18/2023, 8:10 AM    * This document was created using dictation software. While all precautions were taken to ensure accuracy, errors may have occurred. Please disregard any typographical errors.

## 2023-01-18 NOTE — PROGRESS NOTES
[facility] Department   Phone: [phone]    Speech Therapy    [] Initial Evaluation            [x] Daily Treatment Note         [] Discharge Summary      Patient: Trevin Cortes   : 1935   MRN: [id number]   Date of Service:  2023  Admitting Diagnosis: Closed fracture of left femur, sequela  Current Admission Summary: 81-year-old male with a history of BPH, COPD, HTN, HLD, and dementia who was admitted on  with left hip pain after a fall. Work-up revealed a greater trochanter fracture of the left femur. Ortho evaluated and suggested 50% weightbearing on the left lower extremity. He was evaluated by therapy and suggested to continue in an inpatient setting prior to returning home. He was previously living independently at home with the assistance of family. His evening was complicated by increased confusion requiring the placement of a sitter. He remains pleasantly confused this morning. Past Medical History:  has a past medical history of Benign prostatic hypertrophy without urinary obstruction, Bradycardia, COPD (chronic obstructive pulmonary disease) ([address] Dementia ([address] Glaucoma, Gout, Hyperlipidemia, Hypertension, Hypoxemia, Macular degeneration disease, Osteoarthritis, Sleep apnea, and Unspecified sleep apnea. Past Surgical History:  has a past surgical history that includes Total hip arthroplasty (2005); joint replacement; TURP; knee surgery; and Colonoscopy.   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 Pt continuously asking the same question surrounding discharge and reasoning for hospitalization after seeing notes on personal calendar. Re-orientation frequently required. Safety Interventions: patient left in chair, chair alarm in place, call light within reach, patient at risk for falls, telesitter in use, and sitter present      Therapeutic Interventions:     Session 1:   Cognition: Severity: Moderate  and Severe   Orientation   - given personal calendar, pt oriented to month, year and RUSS  - required min cues to orient to date and discharge date   - visual aid provided assistance with orientation to facility, reasoning for hospitalization and current therapies/therapists   - pt increasing agitated when looking at comments on calendar regarding discharge date, house sale and discharge location     Problem solving given photo scenes  - completed with 50% (5/10) accuracy increased to 60% accuracy given min verbal cues   - frequent redirection to task required as pt was very fixated on personal calendar and asking information about where his car/keys were located, why he was on ARU and why his house is being sold  - extended time required for task due to need for consistent redirection     Recall of hip precautions  - pt unable to recall hip precautions independently   - provided with visual aid and following 5 min delay, pt able to recall 1/2 given min-mod verbal cues     Additional Interventions:       Education  Barriers To Learning: cognition  Patient Education: Provided education regarding role of SLP, results of assessment, recommendations and general speech pathology plan of care.    Learning Assessment: Pt verbalized understanding   Pt requires ongoing learning     Assessment  Impairments Requiring Therapeutic Intervention: Cognitive-Linguistic Deficits   Prognosis: fair (baseline dementia)    Clinical Assessment: Pt continues to present with Moderate  and Severe  Cognitive-Linguistic Deficits . Difficulty/limitations persist with short term memory and fx carryover recalling what happened and where he is. Pt with intermittent agitation and willingness to participate. If pt continues to show increased agitation and does not wish to participate will d/c from speech therapy caseload. Continue to target fx short term memory and problem solving to facilitate safe return to prior level of independence. Plan  Frequency: 30 minutes/day; 5 days per week, as tolerated, until goals met, or discharged from ARU. Therapeutic Interventions: Cognitive-Linguistic intervention , Compensatory Cognitive intervention , and Patient/ Family education   Discharge Recommendations: TBD  [facility]  Continued SLP at Discharge: TBD based upon progress     Goals  Time Frame: 7-12 days     Pt will recall functional information (daily tasks, hip precautions, personal hx, etc.) with >70% accuracy with set up of cognitive aids. ongoing  Patient will complete functional problem-solving tasks for daily situations with 80% accuracy, given min cues. ongoing  Patient will be instructed in memory strategies to utilize in order to promote recall of newly learned information with >70% min cues. ongoing      Therapy Session Time      Session 1   Time In 1118   Time Out 1148   Time Code Minutes 30   Individual Minutes 30     Timed Code Treatment Minutes: 30  Total Treatment Minutes: 30    Electronically Signed By:   Amanda Gibbons M.A., [address] [place] [address]  Speech-Language Pathologist    The speech-language pathologist was present, directed the patient's care, made skilled judgment and was responsible for assessment and treatment.     Elena Mccabe.,  Speech-Language Pathology

## 2023-01-18 NOTE — PROGRESS NOTES
[facility] [address]   Phone: [phone]    Occupational Therapy    [] Initial Evaluation            [x] Daily Treatment Note         [] Discharge Summary      Patient: Roldan Driscoll   : 1935   MRN: [id number]   Date of Service:  2023    Admitting Diagnosis:  Closed fracture of left femur, sequela  Current Admission Summary: 80-year-old male with a history of BPH, COPD, HTN, HLD, and dementia who was admitted on  with left hip pain after a fall. Work-up revealed a greater trochanter fracture of the left femur. Ortho evaluated and suggested 50% weightbearing on the left lower extremity. He was evaluated by therapy and suggested to continue in an inpatient setting prior to returning home. He was previously living independently at home with the assistance of family. His evening was complicated by increased confusion requiring the placement of a sitter. He remains pleasantly confused this morning. Past Medical History:  has a past medical history of Benign prostatic hypertrophy without urinary obstruction, Bradycardia, COPD (chronic obstructive pulmonary disease) ([address] Dementia ([address] Glaucoma, Gout, Hyperlipidemia, Hypertension, Hypoxemia, Macular degeneration disease, Osteoarthritis, Sleep apnea, and Unspecified sleep apnea. Past Surgical History:  has a past surgical history that includes Total hip arthroplasty (2005); joint replacement; TURP; knee surgery; and Colonoscopy.     Discharge Recommendations: Pending progress- 24 hour supervision and assist     DME Required For Discharge: DME to be determined pending patient progress, rolling walker, shower chair with back    Precautions/Restrictions: high fall risk, weight bearing, ROM restrictions  Weight Bearing Restrictions: partial weight bearing - 50 %  [] Right Upper Extremity  [] Left Upper Extremity [] Right Lower Extremity  [x] Left Lower Extremity     Required Braces/Orthotics: no braces required   [] Right  [] Left  Positional Restrictions: no active hip abduction     Pre-Admission Information   Lives With: alone                     Type of Home: house  Home Layout: two level, basement with full flight  Home Access:  1 step to enter with handrail. Handrails are located on R side. Bathroom Layout: tub/shower unit, walk in shower--walk-in shower in basement; tub shower on main level. Pt typically uses walk-in shower downstairs. Bathroom Equipment:  no prior equipment  Toilet Height: standard height  Home Equipment: no prior equipment  Transfer Assistance: Independent without use of device  Ambulation Assistance:Independent without use of device  ADL Assistance: independent with all ADL's  IADL Assistance: independent with homemaking tasks--son fills medications; pt able to take them independently every morning. Active :        [x] Yes                 [] No  Hand Dominance: [] Left                 [x] Right  Current Employment: retired. Occupation: real estate  Hobbies: golf with son every weekend  Recent Falls: no recent falls. Note: Social functional information acquired from acute evaluation on 1/12. Pt has baseline dementia. Daughter states, Renato Krishna is functional but forgetful\". She indicates his confusion has been exacerbated since his admission due to unfamiliar setting and events of hospital course. Subjective  General: Pt seated upright in recliner with feet elevated. Sitter present. Pt agreeable to OT tx and shower with moderate verbal cues of encouragement.   Pain: Patient does not rate upon questioning  Pain Interventions: pain medication in place prior to arrival and repositioned       Activities of Daily Living  Basic Activities of Daily Living  Grooming: setup assistance stand by assistance  Grooming Comments: set- up assistance and no verbal cues for oral care while seated in wheelchair, SBA for shaving face while seated in wheelchair with min cues  Upper Extremity Bathing: stand by assistance requires verbal cueing Increased time to complete task  Lower Extremity Bathing: moderate assistance requires verbal cueing Increased time to complete task Comment: assistance for buttocks and below knee   Bathing Comments: seated on shower chair with back, use of hand held shower head, in stance   Upper Extremity Dressing: supervision  Lower Extremity Dressing: minimal assistance  Dressing Comments: seated in shower chair to doff clothes, standing with CGA with GB, seated in wheelchair with parallel GB in bathroom to stand with CGA, assistance for donning both socks, assistance for doffing L sock, assistance for threading pant in L hand  Toileting Comments: urinated in stance in shower  General Comments: Frequent re-orientation, max cues for encouragement throughout, mod cues for sequencing  Instrumental Activities of Daily Living  No IADL completed on this date. Functional Mobility  Bed Mobility  Bed mobility not completed on this date. Transfers  Sit to stand transfer:contact guard assistance  Stand to sit transfer: contact guard assistance  Comments:  from recliner to RW, to/from wheelchair, to/from GB during bathing and dressing, questionable adherence to WB precautions with max cues, mod cues for sequencing transfers  Functional Mobility:  Sitting Balance: stand by assistance. Sitting Balance Comment: in wheelchair  Standing Balance: contact guard assistance. Standing Balance Comment: with RW  or bilateral hand support on countertop   Functional Mobility: .  contact guard assistance  Functional Mobility Activity: to/from bathroom  Functional Mobility Device Use: rolling walker  Functional Mobility Comment: good hand placement, no cues for hand placement  Therapeutic Activity   While seated in wheelchair, pt completed therapeutic activity using playing cards to improve sequencing, working memory, and problem solving.  Pt required max verbal cues progressing to min verbal cues for direction/rule following during game. Pt educated on the purpose of therapeutic activity, receptive to OT education. Pt tolerated task well and asked to play the game again, however, unable to play a second time d/t time constraints. Second Session:  Patient in recliner upon arrival, agreeable to OT session. Daughter present at beginning of session. Therapist asked daughter to bring in clean clothes for patient. Daughter with questionable expectations on patient's recovery and ability to care for himself. Patient reporting not feeling well and requesting therapist to take temperature. Patient's temperature 98.1 degrees. Patient not reporting pain initially but reported soreness in L hip during session, did not rate. Patient completed transfer from recliner to RW with CGA. Patient completed ~10 ft of mobility into bathroom with RW and CGA. Patient required Haydee for clothing mgmt and Haydee for transfer to/from standard toilet with cues to use grab bar. Patient with clean pullup and unable to void. BSC placed overtop toilet in order to improve transfer ability. Patient completed oral hygiene seated in wc with setup. Patient completed wc mobility from room to gym with SBA. Patient participated in cornhole activity with RW and CGA. Task completed in order to challenge standing tolerance, balance, attention to task and overall activity tolerance. Patient completed 4 rounds total, with BUE. No LOB noted. Patient completed ~10 ft of mobility from wc to recliner with CGA and RW. Patient upset about being on rehab unit and not being home. Stating \" I just want to go home. I can't eat the food here. \" Discussed with patient the importance of nutrition for healing and getting stronger. Assisted patient in creating a list of food he likes and provided him with menu. Discussed with nurse patient's need for assistance ordering meals with his food preferences in order to improve patient's intake.          Cognition  Overall Cognitive Status: Impaired  Arousal/Alterness: appropriate responses to stimuli  Following Commands: follows one step commands consistently  Attention Span: difficulty dividing attention  Memory: decreased recall of precautions, decreased recall of recent events, decreased short term memory  Safety Judgement: decreased awareness of need for assistance, decreased awareness of need for safety  Problem Solving: assistance required to generate solutions, assistance required to implement solutions, decreased awareness of errors  Insights: decreased awareness of deficits  Initiation: requires cues for some  Sequencing: requires cues for some  Comments: baseline dementia but increased confusion during hospital stay   drowsy  Orientation:    oriented to person, disoriented to place, disoriented to time , and disoriented to situation  Command Following:   accurately follows one step commands     Education  Barriers To Learning: cognition  Patient Education: patient educated on goals, OT role and benefits, plan of care, precautions, ADL adaptive strategies, weight-bearing education, orientation, disease specific education, transfer training, discharge recommendations  Learning Assessment:  patient will require reinforcement due to cognitive deficits  Assessment  Activity Tolerance: fair-limited by cognition and pain   Impairments Requiring Therapeutic Intervention: decreased functional mobility, decreased ADL status, decreased strength, decreased safety awareness, decreased cognition, decreased endurance, decreased balance, decreased IADL, increased pain, decreased posture  Prognosis: good  Clinical Assessment: Pt is progressing fairly towards goals. Patient continues to require min assistance LB ADLs, CGA for standing balance, and CGA for functional transfers during ADL with  max cues for sequencing, safety, and adherence to WB precautions. Patient limited by pain and confusion but agreeable to participate with therapy. Continue to educate family on patient's current functional status/cognition and begin to discuss supervision and assist patient will require at discharge. Continue POC.   Safety Interventions: patient left in chair, chair alarm in place, call light within reach, patient at risk for falls, and sitter present    Plan  Frequency: 5 x/week, 90 min/day  Current Treatment Recommendations: strengthening, balance training, functional mobility training, transfer training, endurance training, patient/caregiver education, ADL/self-care training, IADL training, cognitive reorientation, safety education, and equipment evaluation/education  Goals  Patient Goals: to return to PLOF   Short Term Goals:  Time Frame: 2 weeks   Patient will complete upper body ADL at modified independent   Patient will complete lower body ADL at modified independent   Patient will complete toileting at modified independent   Patient will complete functional transfers at modified independent   Patient to gather and transport IADL items at modified independent     No goals met 1/18    Therapy Session Time  First Session Therapy Time:   Individual Group Co-treatment   Time In 930     Time Out 1030     Minutes 60       Second Session Therapy Time:   Individual Concurrent Group Co-treatment   Time In 1420        Time Out 1500        Minutes 40              Timed Code Treatment Minutes:  60 + 40     Total Treatment Minutes:  100 minutes      First session: Josemanuel De La Cruz OTR/L, [id number]     Second session: Pascual Dexter, [facility] O[facility]/L [id number]

## 2023-01-18 NOTE — PROGRESS NOTES
[facility] [facility]   Phone: [phone]    Physical Therapy    [] Initial Evaluation            [x] Daily Treatment Note         [] Discharge Summary      Patient: Becky Alvarez   : 1935   MRN: [id number]   Date of Service:  2023  Admitting Diagnosis: Closed fracture of left femur, sequela  Current Admission Summary: 30-year-old male with a history of BPH, COPD, HTN, HLD, and dementia who was admitted on  with left hip pain after a fall. Work-up revealed a greater trochanter fracture of the left femur. Ortho evaluated and suggested 50% weightbearing on the left lower extremity. He was evaluated by therapy and suggested to continue in an inpatient setting prior to returning home. He was previously living independently at home with the assistance of family. His evening was complicated by increased confusion requiring the placement of a sitter. He remains pleasantly confused this morning. Past Medical History:  has a past medical history of Benign prostatic hypertrophy without urinary obstruction, Bradycardia, COPD (chronic obstructive pulmonary disease) ([address] Dementia ([address] Glaucoma, Gout, Hyperlipidemia, Hypertension, Hypoxemia, Macular degeneration disease, Osteoarthritis, Sleep apnea, and Unspecified sleep apnea. Past Surgical History:  has a past surgical history that includes Total hip arthroplasty (2005); joint replacement; TURP; knee surgery; and Colonoscopy.   Discharge Recommendations: Home with Home Health Physical Therapy, Supervision level pending cognitive progress  DME Required For Discharge: rolling walker  Precautions/Restrictions: high fall risk, weight bearing, ROM restrictions  Weight Bearing Restrictions: partial weight bearing - 50 %  [] Right Upper Extremity  [] Left Upper Extremity [] Right Lower Extremity  [x] Left Lower Extremity     Required Braces/Orthotics: no braces required   [] Right  [] Left  Positional Restrictions:No Active Hip ABduction    Pre-Admission Information    ** Pre-admission information gained from family members in acute care setting **  Lives With: alone                     Type of Home: house  Home Layout: two level, basement with full flight  Home Access:  1 step to enter with handrail. Handrails are located on R side. Bathroom Layout: tub/shower unit, walk in shower--walk-in shower in basement; tub shower on main level. Pt typically uses walk-in shower downstairs. Bathroom Equipment:  no prior equipment  Toilet Height: standard height  Home Equipment: no prior equipment  Transfer Assistance: Independent without use of device  Ambulation Assistance:Independent without use of device  ADL Assistance: independent with all ADL's  IADL Assistance: independent with homemaking tasks--son fills medications; pt able to take them independently every morning. Active :        [x] Yes                 [] No  Hand Dominance: [] Left                 [x] Right  Current Employment: retired. Occupation: real estate  Hobbies: golf with son every weekend  Recent Falls: no recent falls. Examination   Vision:   Vision Corrective Device: wears glasses for reading  Hearing:   Lyxia      Subjective  General: Patient seated in recliner upon entry, agreeable to therapy. Pt remains disoriented to location and situation with no recall of injury, and unable retain situation within session despite frequent education and orientation. In addition to continued cognitive deficits, patient presenting with decreased agitation this date. Pain: Patient does not rate upon questioning  Pain Interventions: Not applicable       Functional Mobility  Bed Mobility  Bed mobility not completed on this date. Comments: . Transfers  Sit to stand transfer: contact guard assistance  Stand to sit transfer: contact guard assistance  Stand step transfer: contact guard assistance  Comments: All performed with use of RW.  Continued posterior lean with standing. Ambulation  Surface:level surface  Assistive Device: rolling walker  Assistance: contact guard assistance  Distance: 125 ft  Gait Mechanics: Step-to pattern with mild forward flexed posture, increased UE reliance, and small step length  Comments:  Frequent VC/TC for promoting upright posture   Stair Mobility  Stair mobility not completed on this date. Comments:  Wheelchair Mobility:   No w/c mobility completed on this date. Comments:  Balance  Static Sitting Balance: fair (+): maintains balance at SBA/supervision without use of UE support  Dynamic Sitting Balance: fair (+): maintains balance at SBA/supervision without use of UE support  Static Standing Balance: fair: maintains balance at CGA without use of UE support  Dynamic Standing Balance: fair (-): maintains balance at CGA with use of UE support  Comments:    Other Therapeutic Interventions   First session:   See functional mobility above. In addition patient completes static standing on airex 30 seconds x 2 with one UE support on ballet bar progressing to no UE support at min A. Increased posterior lean noted requiring assistance for stability. VC provided to increase use of ankle strategy to displace weight anteriorly. No LOB occurred. Standing blaze pods activity reaching across and outside BRYANT 2 x 2 min at CGA: Formula reaction - 6 pods with 1 active target, Focus reaction - 6 pods with 5 distractive targets and 1 active target. Patient required one UE support for reaching target below waist level. However, able to reach all other targets without UE support. (R) LE 4 in ascending/descending step taps 1 x 10 with (B) UE support on handrails at [facility]. Second session:     Patient seated in recliner upon entry, agreeable to therapy. Patient performed all transfers at [facility] this date with use of RW. Patient ambulated 150' + 79' with RW at [facility]. Frequent VC provided to unweight (L) LE for maintenance of WB precautions.  In addition to this, standing unweighted ball toss performed without UE support at [facility]. Standing ladder exercises performed at CGA 1 x 10 ea: unweighted ball walk up/down ladder steps, push/pull, lift and lateral twist. No LOB occurred during all standing activities this date. Mini squats performed EOM with use of (B) UE support on RW 2 x 10. Patient returned to recliner with chair alarm in place and call light within reach. Cognition  Overall Cognitive Status: Impaired  Arousal/Alterness: appropriate responses to stimuli  Following Commands: follows one step commands consistently  Attention Span: attends with cues to redirect, difficulty dividing attention  Memory: decreased recall of precautions, decreased short term memory, decreased long term memory  Safety Judgement: decreased awareness of need for assistance, decreased awareness of need for safety  Problem Solving: assistance required to generate solutions, assistance required to implement solutions, decreased awareness of errors  Insights: decreased awareness of deficits  Initiation: requires cues for some  Sequencing: requires cues for some  Comments: Increased impulsivity with frequent VC for reorientation to surroundings. Patient carryover within conversation/directions less than 1 minute before requiring repetition including recall of hip fracture.   Orientation:    oriented to person, disoriented to place, disoriented to time , and disoriented to situation  Command Following:   impaired    Education  Barriers To Learning: cognition  Patient Education: patient educated on goals, PT role and benefits, plan of care, precautions, weight-bearing education, general safety, functional mobility training, proper use of assistive device/equipment, orientation, transfer training  Learning Assessment:  patient will require reinforcement due to cognitive deficits    Assessment  Activity Tolerance: Limited by severe pain and cognitive deficits with no carryover of education/situation. Impairments Requiring Therapeutic Intervention: decreased functional mobility, decreased ADL status, decreased ROM, decreased strength, decreased safety awareness, decreased cognition, decreased endurance, decreased balance, increased pain, decreased posture  Prognosis: fair  Clinical Assessment: Patient functional mobility continues to be completed with one caregiver assist on this date but continues to be limited by pain and cognitive deficits. However, pain seems to be improving this date. Patient able to ambulate further distances this date demonstrating improvements in aerobic endurance. Patient continues to require max education regarding his injury to the (L) hip resulting in frequent redirection and reorientation to participate in therapy session. Patient would continue to benefit from skilled therapy services in order to promote functional independence and improved mobility. Safety Interventions: patient left in chair, chair alarm in place, call light within reach, gait belt, telesitter in use, and sitter present    Plan  Frequency: 5 x/week, 75 min/day  Current Treatment Recommendations: strengthening, ROM, balance training, functional mobility training, transfer training, gait training, stair training, endurance training, neuromuscular re-education, modalities, patient/caregiver education, ADL/self-care training, cognitive reorientation, pain management, and safety education    Goals  Patient Goals:  To return home   Short Term Goals:  Time Frame: 2 weeks  Patient will complete bed mobility at modified independent   Patient will complete transfers at [facility]   Patient will ambulate 150 ft with use of rolling walker at modified independent  Patient will ascend/descend 4 stairs with (R) ascending handrail at modified independent  Patient will complete car transfer at [facility]    Therapy Session Time  First Session Therapy Time:   Individual Concurrent Group Co-treatment   Time In  0836         Time Out  5876         Minutes  52           Second Session Therapy Time:   Individual Concurrent Group Co-treatment   Time In  1245         Time Out  1332         Minutes  47           Timed Code Treatment Minutes:  47 + 47 minutes    Total Treatment Minutes:   94 minutes      Electronically Signed By:     Sharmaine Garcias, [facility]  Therapist observed and directed the patient's plan of care.   Co-signed and supervised by: Marjan Washington PT, DPT - [id number]

## 2023-01-19 LAB
ANION GAP SERPL CALCULATED.3IONS-SCNC: 8 MMOL/L (ref 3–16)
BUN BLDV-MCNC: 19 MG/DL (ref 7–20)
CALCIUM SERPL-MCNC: 8.3 MG/DL (ref 8.3–10.6)
CHLORIDE BLD-SCNC: 106 MMOL/L (ref 99–110)
CO2: 26 MMOL/L (ref 21–32)
CREAT SERPL-MCNC: 0.8 MG/DL (ref 0.8–1.3)
GFR SERPL CREATININE-BSD FRML MDRD: >60 ML/MIN/{1.73_M2}
GLUCOSE BLD-MCNC: 89 MG/DL (ref 70–99)
HCT VFR BLD CALC: 33.7 % (ref 40.5–52.5)
HEMOGLOBIN: 11.1 G/DL (ref 13.5–17.5)
MCH RBC QN AUTO: 30.3 PG (ref 26–34)
MCHC RBC AUTO-ENTMCNC: 33 G/DL (ref 31–36)
MCV RBC AUTO: 91.8 FL (ref 80–100)
PDW BLD-RTO: 12.7 % (ref 12.4–15.4)
PLATELET # BLD: 210 K/UL (ref 135–450)
PMV BLD AUTO: 7.5 FL (ref 5–10.5)
POTASSIUM SERPL-SCNC: 3.9 MMOL/L (ref 3.5–5.1)
RBC # BLD: 3.66 M/UL (ref 4.2–5.9)
SODIUM BLD-SCNC: 140 MMOL/L (ref 136–145)
WBC # BLD: 5.1 K/UL (ref 4–11)

## 2023-01-19 PROCEDURE — 36415 COLL VENOUS BLD VENIPUNCTURE: CPT

## 2023-01-19 PROCEDURE — 97129 THER IVNTJ 1ST 15 MIN: CPT

## 2023-01-19 PROCEDURE — 97116 GAIT TRAINING THERAPY: CPT

## 2023-01-19 PROCEDURE — 80048 BASIC METABOLIC PNL TOTAL CA: CPT

## 2023-01-19 PROCEDURE — 97530 THERAPEUTIC ACTIVITIES: CPT

## 2023-01-19 PROCEDURE — 6370000000 HC RX 637 (ALT 250 FOR IP): Performed by: PHYSICAL MEDICINE & REHABILITATION

## 2023-01-19 PROCEDURE — 97535 SELF CARE MNGMENT TRAINING: CPT

## 2023-01-19 PROCEDURE — 97110 THERAPEUTIC EXERCISES: CPT

## 2023-01-19 PROCEDURE — 97112 NEUROMUSCULAR REEDUCATION: CPT

## 2023-01-19 PROCEDURE — 85027 COMPLETE CBC AUTOMATED: CPT

## 2023-01-19 PROCEDURE — 97130 THER IVNTJ EA ADDL 15 MIN: CPT

## 2023-01-19 PROCEDURE — 6360000002 HC RX W HCPCS: Performed by: PHYSICAL MEDICINE & REHABILITATION

## 2023-01-19 PROCEDURE — 1280000000 HC REHAB R&B

## 2023-01-19 RX ADMIN — DORZOLAMIDE HYDROCHLORIDE 1 DROP: 20 SOLUTION/ DROPS OPHTHALMIC at 09:41

## 2023-01-19 RX ADMIN — OXYCODONE 5 MG: 5 TABLET ORAL at 21:28

## 2023-01-19 RX ADMIN — DORZOLAMIDE HYDROCHLORIDE 1 DROP: 20 SOLUTION/ DROPS OPHTHALMIC at 23:26

## 2023-01-19 RX ADMIN — QUETIAPINE FUMARATE 25 MG: 25 TABLET ORAL at 21:27

## 2023-01-19 RX ADMIN — OXYCODONE 5 MG: 5 TABLET ORAL at 18:02

## 2023-01-19 RX ADMIN — STANDARDIZED SENNA CONCENTRATE AND DOCUSATE SODIUM 2 TABLET: 8.6; 5 TABLET ORAL at 17:56

## 2023-01-19 RX ADMIN — MEMANTINE 5 MG: 5 TABLET ORAL at 21:27

## 2023-01-19 RX ADMIN — TRAZODONE HYDROCHLORIDE 50 MG: 50 TABLET ORAL at 21:28

## 2023-01-19 RX ADMIN — DONEPEZIL HYDROCHLORIDE 10 MG: 5 TABLET, FILM COATED ORAL at 21:27

## 2023-01-19 RX ADMIN — MEMANTINE 5 MG: 5 TABLET ORAL at 17:56

## 2023-01-19 RX ADMIN — ACETAMINOPHEN 1000 MG: 500 TABLET ORAL at 21:26

## 2023-01-19 RX ADMIN — ENOXAPARIN SODIUM 40 MG: 100 INJECTION SUBCUTANEOUS at 21:30

## 2023-01-19 RX ADMIN — LISINOPRIL 5 MG: 5 TABLET ORAL at 17:55

## 2023-01-19 RX ADMIN — STANDARDIZED SENNA CONCENTRATE AND DOCUSATE SODIUM 2 TABLET: 8.6; 5 TABLET ORAL at 21:28

## 2023-01-19 ASSESSMENT — PAIN DESCRIPTION - DESCRIPTORS
DESCRIPTORS: ACHING

## 2023-01-19 ASSESSMENT — PAIN - FUNCTIONAL ASSESSMENT: PAIN_FUNCTIONAL_ASSESSMENT: ACTIVITIES ARE NOT PREVENTED

## 2023-01-19 ASSESSMENT — PAIN SCALES - GENERAL
PAINLEVEL_OUTOF10: 5
PAINLEVEL_OUTOF10: 3
PAINLEVEL_OUTOF10: 5
PAINLEVEL_OUTOF10: 5

## 2023-01-19 ASSESSMENT — PAIN SCALES - WONG BAKER
WONGBAKER_NUMERICALRESPONSE: 0

## 2023-01-19 ASSESSMENT — PAIN DESCRIPTION - PAIN TYPE: TYPE: ACUTE PAIN

## 2023-01-19 ASSESSMENT — PAIN DESCRIPTION - LOCATION
LOCATION: HIP

## 2023-01-19 ASSESSMENT — PAIN DESCRIPTION - ORIENTATION
ORIENTATION: LEFT

## 2023-01-19 NOTE — PROGRESS NOTES
The pt's son at bed side. The pt had a good conversation with his son. The pt ate 1/4th of hamberger and 100% of ensure. The pt took all the am medication at this time except tylenol.

## 2023-01-19 NOTE — PROGRESS NOTES
[facility] [address]   Phone: [phone]    Occupational Therapy    [] Initial Evaluation            [x] Daily Treatment Note         [] Discharge Summary      Patient: Lindsey Jefferson   : 1935   MRN: [id number]   Date of Service:  2023    Admitting Diagnosis:  Closed fracture of left femur, sequela  Current Admission Summary: 44-year-old male with a history of BPH, COPD, HTN, HLD, and dementia who was admitted on  with left hip pain after a fall. Work-up revealed a greater trochanter fracture of the left femur. Ortho evaluated and suggested 50% weightbearing on the left lower extremity. He was evaluated by therapy and suggested to continue in an inpatient setting prior to returning home. He was previously living independently at home with the assistance of family. His evening was complicated by increased confusion requiring the placement of a sitter. He remains pleasantly confused this morning. Past Medical History:  has a past medical history of Benign prostatic hypertrophy without urinary obstruction, Bradycardia, COPD (chronic obstructive pulmonary disease) ([facility] [address] Dementia ([facility] [address] Glaucoma, Gout, Hyperlipidemia, Hypertension, Hypoxemia, Macular degeneration disease, Osteoarthritis, Sleep apnea, and Unspecified sleep apnea. Past Surgical History:  has a past surgical history that includes Total hip arthroplasty (2005); joint replacement; TURP; knee surgery; and Colonoscopy.     Discharge Recommendations: Home with [address] [place] [address]- 24 hour supervision and assist     DME Required For Discharge: DME to be determined pending patient progress, rolling walker, shower chair with back    Precautions/Restrictions: high fall risk, weight bearing, ROM restrictions  Weight Bearing Restrictions: partial weight bearing - 50 %  [] Right Upper Extremity  [] Left Upper Extremity [] Right Lower Extremity  [x] Left Lower Extremity     Required Braces/Orthotics: no braces required   [] Right  [] Left  Positional Restrictions: no active hip abduction     Pre-Admission Information   Lives With: alone                     Type of Home: house  Home Layout: two level, basement with full flight  Home Access:  1 step to enter with handrail. Handrails are located on R side. Bathroom Layout: tub/shower unit, walk in shower--walk-in shower in basement; tub shower on main level. Pt typically uses walk-in shower downstairs. Bathroom Equipment:  no prior equipment  Toilet Height: standard height  Home Equipment: no prior equipment  Transfer Assistance: Independent without use of device  Ambulation Assistance:Independent without use of device  ADL Assistance: independent with all ADL's  IADL Assistance: independent with homemaking tasks--son fills medications; pt able to take them independently every morning. Active :        [x] Yes                 [] No  Hand Dominance: [] Left                 [x] Right  Current Employment: retired. Occupation: real estate  Hobbies: golf with son every weekend  Recent Falls: no recent falls. Note: Social functional information acquired from acute evaluation on 1/12. Pt has baseline dementia. Daughter states, Alexsandra Alvarado is functional but forgetful\". She indicates his confusion has been exacerbated since his admission due to unfamiliar setting and events of hospital course. Subjective  General: Patient in recliner upon arrival with sitter present. Patient agreeable to OT session. Pain: Patient does not rate upon questioning patient reporting left leg is sore but does not rate  Pain Interventions: patient denies pain interventions      Activities of Daily Living  Basic Activities of Daily Living  Upper Extremity Dressing: stand by assistance  Lower Extremity Dressing: minimal assistance  Dressing Comments: Patient doffed/donned jacket and shirt standing at  with SBA. Education provided on sitting for task for increased safety.  Patient able to [facility] pullup and hospital pants with SBA. Patient required Haydee for donning pullup on L leg but able to roma pants with SBA when cued to thread L leg first.  General Comments: Patient declined other ADLs. No LOB noted during dressing. Discussed with nursing staff patient has not had a bowel movement or voided urine with therapist recently. Instrumental Activities of Daily Living  No IADL completed on this date. Functional Mobility  Bed Mobility  Bed mobility not completed on this date. Transfers  Sit to stand transfer:stand by assistance  Stand to sit transfer: stand by assistance  Comments: to/from recliner, EOB and wc   Functional Mobility:  Sitting Balance: supervision. Sitting Balance Comment: in wheelchair and recliner   Standing Balance: stand by assistance, contact guard assistance. Standing Balance Comment: with RW  or bilateral hand support on countertop   Functional Mobility: .  stand by assistance  Functional Mobility Activity: to/from closet in room to pick out clothes, ~25 ft + 25 ft in hallway   Functional Mobility Device Use: rolling walker  Functional Mobility Comment: no LOB noted     Therapeutic Activity  Patient participated in golf activity standing with CGA. Task completed to improve standing tolerance, balance, and attention to task needed for ADLs and mobility. Patient tolerated 3 rounds of putting with seated rest breaks. Patient was able to complete mobility to/from putting green with RW and SBA. Patient able to use putter with BUE, no LOB noted. Patient enjoyed talking about golf and was able to attend to task without perseverating on going home. Second Session:  Patient in recliner upon arrival, agreeable to OT session. Patient continues to be intermittently upset about being on rehab unit and not at home but easily re-directed. Patient also stating \" I want to go to [employer]\". Patient declining having  come to talk with patient.  Increased time at beginning of session spent encouraging patient to eat and drink. Patient able to eat the hard boiled eggs and cucumbers from [employer] salad and 75% on his coke. While encouraging patient to eat, dicussed with patient his career in real estate. Patient enjoyed discussing and educating therapist. Patient attempted to complete math problems to explain commission, etc. Patient unable to correctly complete problems. Patient will require total A in managing medications and money- unsure if family already completes. Patient completed all transfers during session with RW and SBA. Patient completed ~120 ft with RW and SBA. Patient participated in PVC pipe building activity seated in . Task completed in order to challenge functional cognition, problem solving, BUE strength, functional reaching and attention to task. Patient able to complete task with no additional cueing for problem solving or re-direction. Patient able to stand with RW and SBA for 4 mins in order to take apart PVC model. Patient participated well and reported he wants to try a harder one next time. Patient completed ~120 ft of mobility back to room with RW and SBA. Patient left in recliner with chair alarm on, call button in reach, sitter present and all needs met.          Cognition  Overall Cognitive Status: Impaired  Arousal/Alterness: appropriate responses to stimuli  Following Commands: follows one step commands consistently  Attention Span: difficulty dividing attention  Memory: decreased recall of precautions, decreased recall of recent events, decreased short term memory  Safety Judgement: decreased awareness of need for assistance, decreased awareness of need for safety  Problem Solving: assistance required to generate solutions, assistance required to implement solutions, decreased awareness of errors  Insights: decreased awareness of deficits  Initiation: requires cues for some  Sequencing: requires cues for some  Comments: baseline dementia but increased confusion during hospital stay   drowsy  Orientation:    oriented to person, disoriented to place, disoriented to time , and disoriented to situation  Command Following:   accurately follows one step commands     Education  Barriers To Learning: cognition  Patient Education: patient educated on goals, OT role and benefits, plan of care, precautions, ADL adaptive strategies, weight-bearing education, orientation, disease specific education, transfer training, discharge recommendations  Learning Assessment:  patient will require reinforcement due to cognitive deficits    Assessment  Activity Tolerance: fair-limited by cognition and pain   Impairments Requiring Therapeutic Intervention: decreased functional mobility, decreased ADL status, decreased strength, decreased safety awareness, decreased cognition, decreased endurance, decreased balance, decreased IADL, increased pain, decreased posture  Prognosis: good  Clinical Assessment: Pt is progressing fairly towards goals. Patient continues to require min assistance LB ADLs. Patient progressing to SBA for balance, transfers and mobility. Patient limited by pain and confusion but agreeable to participate with therapy. Continue to educate family on patient's current functional status/cognition and begin to discuss supervision and assist patient will require at discharge. Continue POC.   Safety Interventions: patient left in chair, chair alarm in place, call light within reach, patient at risk for falls, and sitter present    Plan  Frequency: 5 x/week, 90 min/day  Current Treatment Recommendations: strengthening, balance training, functional mobility training, transfer training, endurance training, patient/caregiver education, ADL/self-care training, IADL training, cognitive reorientation, safety education, and equipment evaluation/education  Goals  Patient Goals: to return to PLOF   Short Term Goals:  Time Frame: 2 weeks   Patient will complete upper body ADL at modified independent Patient will complete lower body ADL at modified independent   Patient will complete toileting at modified independent   Patient will complete functional transfers at modified independent   Patient to gather and transport IADL items at modified independent     No goals met 1/19    Therapy Session Time  First Session Therapy Time:   Individual Group Co-treatment   Time In 1100     Time Out 1145     Minutes 45       Second Session Therapy Time:   Individual Concurrent Group Co-treatment   Time In 1420        Time Out 1505        Minutes 45              Timed Code Treatment Minutes: 45 + 45 minutes     Total Treatment Minutes:  90 minutes      Amanda Gibson, [address] [place] OTR/L [id number]

## 2023-01-19 NOTE — PROGRESS NOTES
[facility] [address]   Phone: [phone]    Physical Therapy    [] Initial Evaluation            [x] Daily Treatment Note         [] Discharge Summary      Patient: Anish Ulloa   : 1935   MRN: [id number]   Date of Service:  2023  Admitting Diagnosis: Closed fracture of left femur, sequela  Current Admission Summary: 21-year-old male with a history of BPH, COPD, HTN, HLD, and dementia who was admitted on  with left hip pain after a fall. Work-up revealed a greater trochanter fracture of the left femur. Ortho evaluated and suggested 50% weightbearing on the left lower extremity. He was evaluated by therapy and suggested to continue in an inpatient setting prior to returning home. He was previously living independently at home with the assistance of family. His evening was complicated by increased confusion requiring the placement of a sitter. He remains pleasantly confused this morning. Past Medical History:  has a past medical history of Benign prostatic hypertrophy without urinary obstruction, Bradycardia, COPD (chronic obstructive pulmonary disease) ([address] Dementia ([facility] [address] Glaucoma, Gout, Hyperlipidemia, Hypertension, Hypoxemia, Macular degeneration disease, Osteoarthritis, Sleep apnea, and Unspecified sleep apnea. Past Surgical History:  has a past surgical history that includes Total hip arthroplasty (2005); joint replacement; TURP; knee surgery; and Colonoscopy.   Discharge Recommendations: Home with Home Health Physical Therapy, Supervision level pending cognitive progress  DME Required For Discharge: rolling walker  Precautions/Restrictions: high fall risk, weight bearing, ROM restrictions  Weight Bearing Restrictions: partial weight bearing - 50 %  [] Right Upper Extremity  [] Left Upper Extremity [] Right Lower Extremity  [x] Left Lower Extremity     Required Braces/Orthotics: no braces required   [] Right  [] Left  Positional Restrictions:No Active Hip ABduction    Pre-Admission Information    ** Pre-admission information gained from family members in acute care setting **  Lives With: alone                     Type of Home: house  Home Layout: two level, basement with full flight  Home Access:  1 step to enter with handrail. Handrails are located on R side. Bathroom Layout: tub/shower unit, walk in shower--walk-in shower in basement; tub shower on main level. Pt typically uses walk-in shower downstairs. Bathroom Equipment:  no prior equipment  Toilet Height: standard height  Home Equipment: no prior equipment  Transfer Assistance: Independent without use of device  Ambulation Assistance:Independent without use of device  ADL Assistance: independent with all ADL's  IADL Assistance: independent with homemaking tasks--son fills medications; pt able to take them independently every morning. Active :        [x] Yes                 [] No  Hand Dominance: [] Left                 [x] Right  Current Employment: retired. Occupation: real estate  Hobbies: golf with son every weekend  Recent Falls: no recent falls. Examination   Vision:   Vision Corrective Device: wears glasses for reading  Hearing:   [employer]      Subjective  General: Patient seated in recliner upon entry, agreeable to therapy. Pt remains disoriented to location and situation with no recall of injury, and unable retain situation within session despite frequent education and orientation. Patient calm with no agitation within session. Pain: Patient does not rate upon questioning  Pain Interventions: Not applicable       Functional Mobility  Bed Mobility  Bed mobility not completed on this date. Comments: . Transfers  Sit to stand transfer: stand by assistance  Stand to sit transfer: stand by assistance  Stand step transfer: stand by assistance  Comments: All performed with use of RW. Continued posterior lean with standing but improving this date. Ambulation  Surface:level surface  Assistive Device: rolling walker  Assistance: stand by assistance  Distance: 120 + 112 ft + multiple small bouts throughout session  Gait Mechanics: Step-to pattern with mild forward flexed posture, increased UE reliance, and normal step length  Comments:  Occasional VC for upright posture and to push through (B) UE for maintenance of WB precautions  Stair Mobility  Stair mobility not completed on this date. Comments:  Wheelchair Mobility:   No w/c mobility completed on this date. Comments:  Balance  Static Sitting Balance: fair (+): maintains balance at SBA/supervision without use of UE support  Dynamic Sitting Balance: fair (+): maintains balance at SBA/supervision without use of UE support  Static Standing Balance: fair: maintains balance at [facility] without use of UE support  Dynamic Standing Balance: fair (-): maintains balance at [facility] with use of UE support  Comments:    Other Therapeutic Interventions   First session:   See functional mobility above. In addition patient completes STS from elevated chair with use of (B) UE and airex underneath LE at [facility] 2 x 5. Patient required HHA from therapist to achieve upright standing for initial standing balance. However, no LOB occurred. Dynamic standing activities at [facility]: basketball toss into basket on airex with one UE support on RW progressing to no UE support, therapy ball on rebounder without UE support and normal BRYANT, narrow BRYANT, modified tandem with (L) LE forward. Standing (R) LE exercises with UE support on ballet bar 2 x 10 ea: step up and over remy, hip extension. Second session:     Patient seated in recliner upon entry, agreeable to therapy. Patient performed all transfers this session with RW at [place] [address]. Patient ambulated 210 feet from room to therapy gym at [place] [address] with use of RW. VC provided occasionally for maintenance of WB precautions. In addition, stair navigation performed with scale under (L) LE to ensure 50% WB. Patient able to ascend/descend 4 in step x 8 with use of (B UE on HR at CGA to min A. Patient improved ability to [facility] this session with max VC for increased UE use. Patient returned to recliner with chair alarm in place, call light within reach, and sitter present. Cognition  Overall Cognitive Status: Impaired  Arousal/Alterness: appropriate responses to stimuli  Following Commands: follows one step commands consistently  Attention Span: attends with cues to redirect, difficulty dividing attention  Memory: decreased recall of precautions, decreased short term memory, decreased long term memory  Safety Judgement: decreased awareness of need for assistance, decreased awareness of need for safety  Problem Solving: assistance required to generate solutions, assistance required to implement solutions, decreased awareness of errors  Insights: decreased awareness of deficits  Initiation: requires cues for some  Sequencing: requires cues for some  Comments: Increased impulsivity with frequent VC for reorientation to surroundings. Patient carryover within conversation/directions less than 5 minute before requiring repetition including recall of hip fracture. Orientation:    oriented to person, disoriented to place, disoriented to time , and disoriented to situation  Command Following:   impaired    Education  Barriers To Learning: cognition  Patient Education: patient educated on goals, PT role and benefits, plan of care, precautions, weight-bearing education, general safety, functional mobility training, proper use of assistive device/equipment, orientation, transfer training  Learning Assessment:  patient will require reinforcement due to cognitive deficits    Assessment  Activity Tolerance: Limited by severe pain and cognitive deficits with no carryover of education/situation.   Impairments Requiring Therapeutic Intervention: decreased functional mobility, decreased ADL status, decreased ROM, decreased strength, decreased safety awareness, decreased cognition, decreased endurance, decreased balance, increased pain, decreased posture  Prognosis: fair  Clinical Assessment: Patient functional mobility continues to be limited by pain and cognitive deficits. However, patient improving in transfers and ambulation this date as evidenced by progressing from CGA to SBA. He continues to require VC for unweighting the (L) LE to maintain WB precautions secondary to cognitive deficits. Patient able to perform stair mobility this date with maintenance of WB precautions and less assistance. Patient would continue to benefit from skilled therapy services in order to promote functional independence and improved mobility. Safety Interventions: patient left in chair, chair alarm in place, call light within reach, gait belt, telesitter in use, and sitter present    Plan  Frequency: 5 x/week, 75 min/day  Current Treatment Recommendations: strengthening, ROM, balance training, functional mobility training, transfer training, gait training, stair training, endurance training, neuromuscular re-education, modalities, patient/caregiver education, ADL/self-care training, cognitive reorientation, pain management, and safety education    Goals  Patient Goals:  To return home   Short Term Goals:  Time Frame: 2 weeks  Patient will complete bed mobility at modified independent   Patient will complete transfers at [facility]   Patient will ambulate 150 ft with use of rolling walker at modified independent  Patient will ascend/descend 4 stairs with (R) ascending handrail at modified independent  Patient will complete car transfer at [facility]    Therapy Session Time  First Session Therapy Time:   Individual Concurrent Group Co-treatment   Time In  0833         Time Out  0934         Minutes  61           Second Session Therapy Time:   Individual Concurrent Group Co-treatment   Time In  1316         Time Out  1347 Minutes  31           Timed Code Treatment Minutes:  61 + 31 minutes    Total Treatment Minutes:   92 minutes      Electronically Signed By:     WENDY Wolf  Therapist observed and directed the patient's plan of care.   Co-signed and supervised by: Sena Lunsford PT, DPT - [id number]

## 2023-01-19 NOTE — PROGRESS NOTES
Trevin Canal  1/19/2023  [id number]    Chief Complaint: Closed fracture of left femur, sequela    Subjective:   No overnight events. No current complaints. Labs reviewed. ROS: No CP, SOB, dyspnea    Objective:  Patient Vitals for the past 24 hrs:   BP Temp Temp src Pulse Resp SpO2 Weight   01/18/23 2145 134/83 97.7 °F (36.5 °C) Oral 74 18 94 % --   01/18/23 1256 -- -- -- -- -- -- 207 lb 8 oz (94.1 kg)       Gen: No distress, pleasant. Resting in bedside chair  HEENT: Normocephalic, atraumatic. CV: Regular rate and rhythm. No MRG   Resp: No respiratory distress. CTAB   Abd: Soft, nontender, nondistended  Ext: No edema. Neuro: Alert, oriented, appropriately interactive. Higher level cognitive deficits present    Laboratory data: Available via EMR. Therapy progress:    PT    Supine to Sit: Supervision or touching assistance  Sit to Supine:     Sit to Stand: Partial/moderate assistance  Chair/Bed to Chair Transfer: Partial/moderate assistance  Car Transfer: Partial/moderate assistance  Ambulation 10 ft: Supervision or touching assistance  Ambulation 50 ft: Supervision or touching assistance  Ambulation 150 ft:    Stairs - 1 Step: Dependent  Stairs - 4 Step: Dependent  Stairs - 12 Step:      OT    Eating: Setup or clean-up assistance  Oral Hygiene: Setup or clean-up assistance  Bathing: Partial/moderate assistance  Upper Body Dressing: Supervision or touching assistance  Lower Body Dressing: Partial/moderate assistance  Toilet Transfer: Partial/moderate assistance  Toilet Hygiene: Partial/moderate assistance    Speech Therapy    Pt presents with moderate to severe short term memory deficits and reduced carry over of newly learned information impacting his cognitive flexibility and problem solving. During evaluation, pt looked to cognitive aid in room (calendar) to improve orientation and recall of written information, however unable to recall why certain things were written.  Per pt report pt was independent at home with family support prior to admission and has since demonstrated increased confusion and disorientation in new environment. Pt would benefit from a trial of speech therapy to enhance cognitive function with use of cognitive aides and space retrieval techniques in attempt to improve functional carry over and maximize independence at discharge. Body mass index is 28.94 kg/m². Assessment:  Patient Active Problem List   Diagnosis    Benign prostatic hyperplasia without urinary obstruction    Mixed hyperlipidemia    Bradycardia    Primary hypertension    Pre-diabetes    Primary osteoarthritis of right knee    Memory impairment    Nipple soreness    Skin lesion on examination    Late onset Alzheimer's dementia without behavioral disturbance (HCC)    Vitamin D insufficiency    Nondisplaced fracture of greater trochanter of left femur, initial encounter for closed fracture (HCC)    Unable to ambulate    Alzheimer's dementia ([facility] [address]    Imbalance    Ataxia    Closed fracture of left femur, sequela       Plan:   Left greater trochanter femur fracture: 50% WBAT in LLE. PT/OT. Pain control. Dementia: resumed namenda and aricept per home regimen. Seroquel 25 as needed     HTN: hold lisinopril 10 held. Resumed @ 5 mg        Bowels: Per protocol  Bladder: Per protocol   Sleep: Trazodone provided prn. Pain: tylenol scheduled, kelly PRN 5mg  DVT PPx: lovenox   ARSENIO: 1/26    Anthony Wang MD 1/19/2023, 8:18 AM    * This document was created using dictation software. While all precautions were taken to ensure accuracy, errors may have occurred. Please disregard any typographical errors.

## 2023-01-19 NOTE — PROGRESS NOTES
The pt continue to decline any of am medication and pain medication when offered few more times. The pt ate 25% of lunch. The pt has no apatite. Stated his abdomen is not comfortable. Refused to eat anything offered. Frequently expressed wanting to go home and forgetful. Continue with  at bed side.

## 2023-01-19 NOTE — PROGRESS NOTES
Offered routine am medication and oxycodone two times. The pt still refused to take it. The pt expressed wanting to go home and stated medications making him sick. Provided emotional support.

## 2023-01-19 NOTE — PROGRESS NOTES
[facility] Department   Phone: [phone]    Speech Therapy    [] Initial Evaluation            [x] Daily Treatment Note         [] Discharge Summary      Patient: Amilcar Johnson   : 1935   MRN: [id number]   Date of Service:  2023  Admitting Diagnosis: Closed fracture of left femur, sequela  Current Admission Summary: 70-year-old male with a history of BPH, COPD, HTN, HLD, and dementia who was admitted on  with left hip pain after a fall. Work-up revealed a greater trochanter fracture of the left femur. Ortho evaluated and suggested 50% weightbearing on the left lower extremity. He was evaluated by therapy and suggested to continue in an inpatient setting prior to returning home. He was previously living independently at home with the assistance of family. His evening was complicated by increased confusion requiring the placement of a sitter. He remains pleasantly confused this morning. Past Medical History:  has a past medical history of Benign prostatic hypertrophy without urinary obstruction, Bradycardia, COPD (chronic obstructive pulmonary disease) ([address] Dementia ([address] Glaucoma, Gout, Hyperlipidemia, Hypertension, Hypoxemia, Macular degeneration disease, Osteoarthritis, Sleep apnea, and Unspecified sleep apnea. Past Surgical History:  has a past surgical history that includes Total hip arthroplasty (2005); joint replacement; TURP; knee surgery; and Colonoscopy.   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 Pt alert and intermittently agitated questioning different targeted cognitive tasks. Improved ability to reorient and less perseverative on questions surrounding discharge this date.    Pain: Pt reported 5-6/10 pain in his L hip (ice pack in place), RN aware, pt currently refusing to take medications during session when offered by RN   Safety Interventions: patient left in chair, chair alarm in place, call light within reach, patient at risk for falls, telesitter in use, and sitter present      Therapeutic Interventions:     Session 1:   Cognition: Severity: Moderate  and Severe   Orientation   - pt Independently utilized newspaper in hand to state month, year, robson, date  - pt able to state being in the hospital due to falling on his hip   - unable to recall d/c date from hospital (reoriented pt to monthly calendar with written d/c date)    Fx Problem Solving  - practicing safe sequence of steps during sit to stand and stand to sit transfers   - pt able to state he is using the walker with therapy provided with visual aid and mod cues   - pt able to point out what was unsafe during role playing scenario with SLP using walker for sit to stand and stand to sit transfers but required mod cues to indicate how to change hand positioning to make transfers more safe (I.e. push up from surface vs walker and reach back vs plopping)  - Attempted to have pt sequence 5 pictures during a sit to stand transfer with pt requiring mod-max cues to complete     Recall of hip precautions  - pt unable to recall hip precautions independently requiring set up of cognitive aid to read    Personal Recall  - Long term memory appears generally intact (able to state schools attended, where he grew up, what sports he played and recite school song and what his mother did for a living). Pt smiling throughout when discussing his childhood and past events in his life. Fx Recall  - pt with ongoing recall of injured L hip during session  - unable to recall breakfast items consumed   - unable to recall familiar therapists or having PT prior to speech session   - unable to recall d/c date from hospital     Additional Interventions:       Education  Barriers To Learning: cognition  Patient Education: Provided education regarding role of SLP, results of assessment, recommendations and general speech pathology plan of care. Learning Assessment: Pt verbalized understanding   Pt requires ongoing learning     Assessment  Impairments Requiring Therapeutic Intervention: Cognitive-Linguistic Deficits   Prognosis: fair (baseline dementia)    Clinical Assessment: Pt continues to present with Moderate  and Severe  Cognitive-Linguistic Deficits . Difficulty/limitations persist with short term memory and fx carryover recalling what happened and where he is. Pt with intermittent agitation and willingness to participate. If pt continues to show increased agitation and does not wish to participate will d/c from speech therapy caseload. Continue to target fx short term memory and problem solving to facilitate safe return to prior level of independence. Plan  Frequency: 30 minutes/day; 5 days per week, as tolerated, until goals met, or discharged from ARU. Therapeutic Interventions: Cognitive-Linguistic intervention , Compensatory Cognitive intervention , and Patient/ Family education   Discharge Recommendations: TBD  pt [facility]  Continued SLP at Discharge: TBD based upon progress     Goals  Time Frame: 7-12 days     Pt will recall functional information (daily tasks, hip precautions, personal hx, etc.) with >70% accuracy with set up of cognitive aids.  ongoing  Patient will complete functional problem-solving tasks for daily situations with 80% accuracy, given min cues. ongoing  Patient will be instructed in memory strategies to utilize in order to promote recall of newly learned information with >70% min cues. ongoing      Therapy Session Time      Session 1   Time In 0945   Time Out 1015   Time Code Minutes 30   Individual Minutes 30     Timed Code Treatment Minutes: 30  Total Treatment Minutes: 30    Electronically Signed By:   Dalton JACKSON CCC-SLP #[id number] 1/19/2023 2:23 PM  Speech-Language Pathologist    The speech-language pathologist was present, directed the patient's care, made skilled judgment and was responsible for assessment and treatment.     Elena Mccabe.,  Speech-Language Pathology

## 2023-01-19 NOTE — PROGRESS NOTES
The pt back from PT. Sitting up in a recliner reading a news paper. The pt stated he has nausea and didn't want to drink or take any medication.

## 2023-01-20 PROCEDURE — 97110 THERAPEUTIC EXERCISES: CPT

## 2023-01-20 PROCEDURE — 97535 SELF CARE MNGMENT TRAINING: CPT

## 2023-01-20 PROCEDURE — 97530 THERAPEUTIC ACTIVITIES: CPT

## 2023-01-20 PROCEDURE — 1280000000 HC REHAB R&B

## 2023-01-20 PROCEDURE — 97130 THER IVNTJ EA ADDL 15 MIN: CPT

## 2023-01-20 PROCEDURE — 97116 GAIT TRAINING THERAPY: CPT

## 2023-01-20 PROCEDURE — 97129 THER IVNTJ 1ST 15 MIN: CPT

## 2023-01-20 PROCEDURE — 6370000000 HC RX 637 (ALT 250 FOR IP): Performed by: PHYSICAL MEDICINE & REHABILITATION

## 2023-01-20 PROCEDURE — 6360000002 HC RX W HCPCS: Performed by: PHYSICAL MEDICINE & REHABILITATION

## 2023-01-20 RX ORDER — LISINOPRIL 10 MG/1
10 TABLET ORAL DAILY
Status: DISCONTINUED | OUTPATIENT
Start: 2023-01-20 | End: 2023-01-26 | Stop reason: HOSPADM

## 2023-01-20 RX ADMIN — MEMANTINE 5 MG: 5 TABLET ORAL at 21:25

## 2023-01-20 RX ADMIN — DONEPEZIL HYDROCHLORIDE 10 MG: 5 TABLET, FILM COATED ORAL at 21:25

## 2023-01-20 RX ADMIN — ACETAMINOPHEN 1000 MG: 500 TABLET ORAL at 21:24

## 2023-01-20 RX ADMIN — ENOXAPARIN SODIUM 40 MG: 100 INJECTION SUBCUTANEOUS at 21:25

## 2023-01-20 RX ADMIN — DORZOLAMIDE HYDROCHLORIDE 1 DROP: 20 SOLUTION/ DROPS OPHTHALMIC at 21:29

## 2023-01-20 RX ADMIN — STANDARDIZED SENNA CONCENTRATE AND DOCUSATE SODIUM 2 TABLET: 8.6; 5 TABLET ORAL at 21:25

## 2023-01-20 RX ADMIN — STANDARDIZED SENNA CONCENTRATE AND DOCUSATE SODIUM 2 TABLET: 8.6; 5 TABLET ORAL at 07:57

## 2023-01-20 RX ADMIN — MEMANTINE 5 MG: 5 TABLET ORAL at 07:56

## 2023-01-20 RX ADMIN — QUETIAPINE FUMARATE 25 MG: 25 TABLET ORAL at 21:25

## 2023-01-20 RX ADMIN — ACETAMINOPHEN 1000 MG: 500 TABLET ORAL at 14:49

## 2023-01-20 RX ADMIN — ACETAMINOPHEN 1000 MG: 500 TABLET ORAL at 07:57

## 2023-01-20 RX ADMIN — TRAZODONE HYDROCHLORIDE 50 MG: 50 TABLET ORAL at 21:25

## 2023-01-20 RX ADMIN — LISINOPRIL 10 MG: 10 TABLET ORAL at 07:59

## 2023-01-20 RX ADMIN — DORZOLAMIDE HYDROCHLORIDE 1 DROP: 20 SOLUTION/ DROPS OPHTHALMIC at 08:04

## 2023-01-20 ASSESSMENT — PAIN DESCRIPTION - DESCRIPTORS
DESCRIPTORS: ACHING;SORE
DESCRIPTORS: ACHING
DESCRIPTORS: ACHING

## 2023-01-20 ASSESSMENT — PAIN SCALES - WONG BAKER: WONGBAKER_NUMERICALRESPONSE: 0

## 2023-01-20 ASSESSMENT — PAIN - FUNCTIONAL ASSESSMENT
PAIN_FUNCTIONAL_ASSESSMENT: ACTIVITIES ARE NOT PREVENTED
PAIN_FUNCTIONAL_ASSESSMENT: ACTIVITIES ARE NOT PREVENTED
PAIN_FUNCTIONAL_ASSESSMENT: PREVENTS OR INTERFERES SOME ACTIVE ACTIVITIES AND ADLS

## 2023-01-20 ASSESSMENT — PAIN DESCRIPTION - FREQUENCY: FREQUENCY: CONTINUOUS

## 2023-01-20 ASSESSMENT — PAIN DESCRIPTION - LOCATION
LOCATION: HIP

## 2023-01-20 ASSESSMENT — PAIN DESCRIPTION - ONSET: ONSET: ON-GOING

## 2023-01-20 ASSESSMENT — PAIN DESCRIPTION - PAIN TYPE
TYPE: ACUTE PAIN
TYPE: ACUTE PAIN

## 2023-01-20 ASSESSMENT — PAIN SCALES - GENERAL
PAINLEVEL_OUTOF10: 4
PAINLEVEL_OUTOF10: 3
PAINLEVEL_OUTOF10: 4
PAINLEVEL_OUTOF10: 4
PAINLEVEL_OUTOF10: 0

## 2023-01-20 ASSESSMENT — PAIN DESCRIPTION - ORIENTATION
ORIENTATION: LEFT
ORIENTATION: RIGHT
ORIENTATION: RIGHT

## 2023-01-20 NOTE — PROGRESS NOTES
[facility] Department   Phone: [phone]    Occupational Therapy    [] Initial Evaluation            [x] Daily Treatment Note         [] Discharge Summary      Patient: Boy Cormier   : 1935   MRN: [id number]   Date of Service:  2023    Admitting Diagnosis:  Closed fracture of left femur, sequela  Current Admission Summary: 81-year-old male with a history of BPH, COPD, HTN, HLD, and dementia who was admitted on  with left hip pain after a fall. Work-up revealed a greater trochanter fracture of the left femur. Ortho evaluated and suggested 50% weightbearing on the left lower extremity. He was evaluated by therapy and suggested to continue in an inpatient setting prior to returning home. He was previously living independently at home with the assistance of family. His evening was complicated by increased confusion requiring the placement of a sitter. He remains pleasantly confused this morning. Past Medical History:  has a past medical history of Benign prostatic hypertrophy without urinary obstruction, Bradycardia, COPD (chronic obstructive pulmonary disease) ([facility] [address] Dementia ([facility] [address] Glaucoma, Gout, Hyperlipidemia, Hypertension, Hypoxemia, Macular degeneration disease, Osteoarthritis, Sleep apnea, and Unspecified sleep apnea. Past Surgical History:  has a past surgical history that includes Total hip arthroplasty (2005); joint replacement; TURP; knee surgery; and Colonoscopy.     Discharge Recommendations: Home with [address] [place] [address]- 24 hour supervision and assist     DME Required For Discharge: DME to be determined pending patient progress, rolling walker, shower chair with back    Precautions/Restrictions: high fall risk, weight bearing, ROM restrictions  Weight Bearing Restrictions: partial weight bearing - 50 %  [] Right Upper Extremity  [] Left Upper Extremity [] Right Lower Extremity  [x] Left Lower Extremity     Required Braces/Orthotics: no braces required   [] Right  [] Left  Positional Restrictions: no active hip abduction     Pre-Admission Information   Lives With: alone                     Type of Home: house  Home Layout: two level, basement with full flight  Home Access:  1 step to enter with handrail. Handrails are located on R side. Bathroom Layout: tub/shower unit, walk in shower--walk-in shower in basement; tub shower on main level. Pt typically uses walk-in shower downstairs. Bathroom Equipment:  no prior equipment  Toilet Height: standard height  Home Equipment: no prior equipment  Transfer Assistance: Independent without use of device  Ambulation Assistance:Independent without use of device  ADL Assistance: independent with all ADL's  IADL Assistance: independent with homemaking tasks--son fills medications; pt able to take them independently every morning. Active :        [x] Yes                 [] No  Hand Dominance: [] Left                 [x] Right  Current Employment: retired. Occupation: real estate  Hobbies: golf with son every weekend  Recent Falls: no recent falls. Note: Social functional information acquired from acute evaluation on 1/12. Pt has baseline dementia. Daughter states, Shad Sharma is functional but forgetful\". She indicates his confusion has been exacerbated since his admission due to unfamiliar setting and events of hospital course. Subjective  General: Patient in recliner upon arrival with sitter present. Patient agreeable to OT session and bathing with min encouragement. Pt pleasant, agreeable, and cooperative throughout therapy session.    Pain: Patient does not rate upon questioning patient reporting left leg is sore but does not rate  Pain Interventions: patient denies pain interventions      Activities of Daily Living  Basic Activities of Daily Living  Grooming: setup assistance  Grooming Comments: seated in wheelchair for shaving face at sink, pt seated rather than standing for grooming task for pain management and energy conservation technique  Upper Extremity Bathing: supervision  Lower Extremity Bathing: supervision   Bathing Comments: SUP for thoroughness, pt used long handled sponge as needed for feet, able to bend over to reach to ankles  Upper Extremity Dressing: setup assistance supervision  Lower Extremity Dressing: minimal assistance  Dressing Comments: Patient doffed/donned jacket and shirt standing at RW with SBA. Education provided on sitting for task for increased safety. Patient able to doff pullup and hospital pants with SBA. Patient required Haydee for donning pullup on L leg but able to roma pants with SBA when cued to thread L leg first.  Toileting: stand by assistance. Toileting Comments: attempted to have a bowel movement however was unsuccessful, voided urine while in stance with GB  General Comments: min verbal cues for safety awareness, SUP throughout for thoroughness  Instrumental Activities of Daily Living  No IADL completed on this date. Functional Mobility  Bed Mobility  Bed mobility not completed on this date. Transfers  Sit to stand transfer:stand by assistance  Stand to sit transfer: stand by assistance  Toilet transfer: stand by assistance  Toilet transfer equipment: raised toilet seat with rails, grab bars, walker  Shower transfer: minimal assistance  Shower transfer equipment: shower seat with back, grab bars, walker  Comments: to/from recliner, EOB, shower chair, and wc, verbal cues for sequencing to ensure adherence to WB precautions  Functional Mobility:  Sitting Balance: supervision. Sitting Balance Comment: in wheelchair and recliner   Standing Balance: stand by assistance, contact guard assistance.     Standing Balance Comment: with RW or GB   Functional Mobility: .  stand by assistance  Functional Mobility Activity: ~50 ft in hallway to get towels and washcloths for bathing, in room 15 ft+ 15ft+ 5 ft + 10 ft  Functional Mobility Device Use: rolling walker  Functional Mobility Comment: no LOB noted, cues for WB precautions     Second Session  Pt seated upright in recliner with chair alarm in place. Pt is agreeable to OT tx. Pt completed functional transfers with CGA and min verbal cues for WB precautions. Pt completed functional mobility using RW with CGA and min verbal cues for WB precautions. While in stance, pt completed 2 sets of 10x using a 5# hand weight in one hand: shoulder abduction/adduction, elbow flexion/extension, and chest press with mod verbal cues for positioning and technique. Pt required CGA for dynamic standing balance at RW with verbal cues for seated rest breaks as pt does not have insight into deficits. Pt tolerated task well with three seated rest breaks. Pt educated on the purpose of there ex to improve strength and endurance for ADLs of choice, pt verbalized understanding and was receptive to OT education. Pt left in recliner with chair alarm, call light, and all needs met. Sitter present. Pt declining to use the restroom despite frustration with constipation.      Cognition  Overall Cognitive Status: Impaired  Arousal/Alterness: appropriate responses to stimuli  Following Commands: follows one step commands consistently  Attention Span: difficulty dividing attention  Memory: decreased recall of precautions, decreased recall of recent events, decreased short term memory  Safety Judgement: decreased awareness of need for assistance, decreased awareness of need for safety  Problem Solving: assistance required to generate solutions, assistance required to implement solutions, decreased awareness of errors  Insights: decreased awareness of deficits  Initiation: requires cues for some  Sequencing: requires cues for some  Comments: baseline dementia but increased confusion during hospital stay   Orientation:    oriented to person, disoriented to place, disoriented to time , and disoriented to situation  Command Following:   accurately follows one step commands  Education  Barriers To Learning: cognition  Patient Education: patient educated on goals, OT role and benefits, plan of care, precautions, ADL adaptive strategies, weight-bearing education, orientation, disease specific education, transfer training, discharge recommendations  Learning Assessment:  patient will require reinforcement due to cognitive deficits  Assessment  Activity Tolerance: fair-limited by cognition and pain, however improving compared to previous sessions  Impairments Requiring Therapeutic Intervention: decreased functional mobility, decreased ADL status, decreased strength, decreased safety awareness, decreased cognition, decreased endurance, decreased balance, decreased IADL, increased pain, decreased posture  Prognosis: good  Clinical Assessment: Pt is progressing fairly towards goals. Patient continues to require min assistance LB dressing however was able to complete LB bathing with SBA at this date. Patient progressing to SBA for balance, transfers and mobility with verbal cues to ensure adherence to WB precautions. Patient limited by pain and confusion but agreeable to participate with therapy. Recommend continued education for family on patient's current functional status/cognition and begin to discuss supervision and assist patient will require at discharge. Continue POC.   Safety Interventions: patient left in chair, chair alarm in place, call light within reach, patient at risk for falls, and sitter present    Plan  Frequency: 5 x/week, 90 min/day  Current Treatment Recommendations: strengthening, balance training, functional mobility training, transfer training, endurance training, patient/caregiver education, ADL/self-care training, IADL training, cognitive reorientation, safety education, and equipment evaluation/education  Goals  Patient Goals: to return to PLOF   Short Term Goals:  Time Frame: 2 weeks   Patient will complete upper body ADL at modified independent   Patient will complete lower body ADL at modified independent   Patient will complete toileting at modified independent   Patient will complete functional transfers at modified independent   Patient to gather and transport IADL items at modified independent     -Goals not yet met this date      Therapy Session Time  First Session Therapy Time:   Individual Group Co-treatment   Time In 1000     Time Out 1100     Minutes 60       Second Session Therapy Time:   Individual Concurrent Group Co-treatment   Time In 1315        Time Out 1345        Minutes 30            Timed Code Treatment Minutes: 60+30    Total Treatment Minutes:  [address] [facility], [facility], [id number]

## 2023-01-20 NOTE — CARE COORDINATION
PAO/CESAR spoke with patient's daughter Italia Rios. Italia Rios stating that the patient is scheduled to discharge from [facility] to the [facility] on next week. Daughter okay with this writer following up with the [facility] and the home care provider at the facility. PAO/CESAR contacted the [facility] ([phone]) and confirmed that the patient is moving in on 1/26/2023. That is correct according to the [facility]. The [facility] staff stating that [address] [place] [address] is the home care provider in the Building ([phone]) and Sarahi Love ([phone]) is the nurse/. PAO/CESAR contacted Sarahi Love. [facility] will follow-up with the patient at discharge. PAO will follow.     Electronically signed by SONDRA Torres on 1/20/2023 at 2:13 PM

## 2023-01-20 NOTE — PROGRESS NOTES
[facility] [address]   Phone: [phone]    Speech Therapy    [] Initial Evaluation            [x] Daily Treatment Note         [] Discharge Summary      Patient: Leona Fritz   : 1935   MRN: [id number]   Date of Service:  2023  Admitting Diagnosis: Closed fracture of left femur, sequela  Current Admission Summary: 80-year-old male with a history of BPH, COPD, HTN, HLD, and dementia who was admitted on  with left hip pain after a fall. Work-up revealed a greater trochanter fracture of the left femur. Ortho evaluated and suggested 50% weightbearing on the left lower extremity. He was evaluated by therapy and suggested to continue in an inpatient setting prior to returning home. He was previously living independently at home with the assistance of family. His evening was complicated by increased confusion requiring the placement of a sitter. He remains pleasantly confused this morning. Past Medical History:  has a past medical history of Benign prostatic hypertrophy without urinary obstruction, Bradycardia, COPD (chronic obstructive pulmonary disease) ([address] Dementia ([address] Glaucoma, Gout, Hyperlipidemia, Hypertension, Hypoxemia, Macular degeneration disease, Osteoarthritis, Sleep apnea, and Unspecified sleep apnea. Past Surgical History:  has a past surgical history that includes Total hip arthroplasty (2005); joint replacement; TURP; knee surgery; and Colonoscopy.   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 Pt alert, less agitated and more responsive to treatment this date. Pt's daughter Sandra Benavides) present for majority of session. Pt retelling stories from previous date to therapists. Pain: Pt reported he was in a little pain in his L hip. Denied need of pain intervention.    Safety Interventions: patient left in chair, chair alarm in place, call light within reach, patient at risk for falls, telesitter in use, and sitter present, OT present at end of session       Therapeutic Interventions:     Session 1:   Cognition: Severity: Moderate  and Severe   Orientation   - see below within completion of BCRS)    Fx Problem Solving   - when provided with scenario of what to do if he was in pain pt required max assistance to come up with solutions due to reduced cognitive flexibility and poor carryover     Personal Recall  - see below via completion of BCRS   - pt unable to recite daughter's phone number written on pt's monthly calendar (daughter corrected and pt able to rewrite)     Fx Recall  - unable to recall working with therapy this date  - aware of situation (falling and hurting L hip)     Brief Cognitive Rating Scale (BCRS) completed with pt:  - Axis I - Concentration 2 (subjective decrement in concentration ability - able to subject serial 7's from 100 6x)   - Axis II - Recent Memory - 5 (knew month and current address but unable to recall current president despite mod cues)   - Axis III - Past Memory 4 (Clear-cut deficit, cannot recall childhood friends and/or teachers but knows names of schools attended, required prompts for chronology of events)   - Axis IV - Orientation - 5 (fluctuates but unsure of facility name, month, able to state current season)   - Axis V - Functioning and Self-Care 4 (decreased ability to perform complex tasks -- planning dinner, handling finances etc)     Total Score = 20/5 = 4.0     Pt is falling within the Moderate Cognitive Decline (Mild Dementia) Range - clear cut deficit in clinical interview manifests in the following areas as decreased knowledge of current/ recent events; may exhibit some deficit in memory of ones personal hx; decreased ability to travel, handle finances etc. frequently no deficit in following areas, orientation to time and place; recognition of familiar persons/ faces; ability to travel to familiar locations. There is an inability to perform complex tasks. Denial is dominant defense mechanism. Flattening affect and withdrawal from challenging situations frequently occur. Stages 1-3 are the pre-dementia stages. Stages 4-7 are the dementia stages. Beginning in stage 5 an individual can no longer survive without assistance. Per chart review pt does have a formal dx of dementia. Utilized scale to drive therapy, for education purposes and for overall poc. Additional Interventions: Per pt's daughter report/ chart review pt more confused last night and was calling her multiple times with staff in order to attempt to reorient and manage agitation. Pt's daughter requesting pt to be placed on narco vs oxy pain medication because she thinks he can tolerate that a little better and it does not impact him as much cognitively. RN aware/ will pass along to MD. Suspect pt is also sundowning at night (pt's daughter requires ongoing education re: progression of dementia). Education  Barriers To Learning: cognition  Patient Education: Provided education regarding role of SLP, results of assessment, recommendations and general speech pathology plan of care.    Learning Assessment: Pt verbalized understanding   Pt requires ongoing learning     Assessment  Impairments Requiring Therapeutic Intervention: Cognitive-Linguistic Deficits   Prognosis: fair (baseline dementia)    Clinical Assessment: Pt continues to present with Moderate  and Severe  Cognitive-Linguistic Deficits . Difficulty/limitations persist with short term memory and fx carryover recalling what happened and where he is. Pt with intermittent agitation and willingness to participate. This date pt receptive to completing the Brief Cognitive Rating Scale (BCRS) interview questions (see above) with pt falling within the moderate cognitive decline (mild dementia) range on the Global Deterioration Scale or Assessment of Primary Degenerative Dementia (see above). If pt continues to show increased agitation and does not wish to participate will d/c from speech therapy caseload. Continue to target fx short term memory and problem solving to facilitate safe return to prior level of independence. Plan  Frequency: 30 minutes/day; 5 days per week, as tolerated, until goals met, or discharged from ARU. Therapeutic Interventions: Cognitive-Linguistic intervention , Compensatory Cognitive intervention , and Patient/ Family education   Discharge Recommendations: TBD  pt [facility], pt's family may benefit from education re: memory units at facilities as the [facility] is an independent living facility   Continued SLP at Discharge: TBD based upon progress     Goals  Time Frame: 7-12 days     Pt will recall functional information (daily tasks, hip precautions, personal hx, etc.) with >70% accuracy with set up of cognitive aids. ongoing  Patient will complete functional problem-solving tasks for daily situations with 80% accuracy, given min cues. ongoing  Patient will be instructed in memory strategies to utilize in order to promote recall of newly learned information with >70% min cues.  ongoing      Therapy Session Time      Session 1   Time In 0930   Time Out 1000   Time Code Minutes 30   Individual Minutes 30     Timed Code Treatment Minutes: 30  Total Treatment Minutes: 30    Electronically Signed By:   Marco Antonio JACKSON CCC-SLP #[id number] 1/20/2023 10:43 AM  Speech-Language Pathologist

## 2023-01-20 NOTE — PROGRESS NOTES
[facility]   Phone: [phone]    Physical Therapy    [] Initial Evaluation            [x] Daily Treatment Note         [] Discharge Summary      Patient: Maru Piña   : 1935   MRN: [id number]   Date of Service:  2023  Admitting Diagnosis: Closed fracture of left femur, sequela  Current Admission Summary: 46-year-old male with a history of BPH, COPD, HTN, HLD, and dementia who was admitted on  with left hip pain after a fall. Work-up revealed a greater trochanter fracture of the left femur. Ortho evaluated and suggested 50% weightbearing on the left lower extremity. He was evaluated by therapy and suggested to continue in an inpatient setting prior to returning home. He was previously living independently at home with the assistance of family. His evening was complicated by increased confusion requiring the placement of a sitter. He remains pleasantly confused this morning. Past Medical History:  has a past medical history of Benign prostatic hypertrophy without urinary obstruction, Bradycardia, COPD (chronic obstructive pulmonary disease) ([address] Dementia ([address] Glaucoma, Gout, Hyperlipidemia, Hypertension, Hypoxemia, Macular degeneration disease, Osteoarthritis, Sleep apnea, and Unspecified sleep apnea. Past Surgical History:  has a past surgical history that includes Total hip arthroplasty (2005); joint replacement; TURP; knee surgery; and Colonoscopy.   Discharge Recommendations: Home with Home Health Physical Therapy, Supervision level pending cognitive progress  DME Required For Discharge: rolling walker  Precautions/Restrictions: high fall risk, weight bearing, ROM restrictions  Weight Bearing Restrictions: partial weight bearing - 50 %  [] Right Upper Extremity  [] Left Upper Extremity [] Right Lower Extremity  [x] Left Lower Extremity     Required Braces/Orthotics: no braces required   [] Right  [] Left  Positional Restrictions:No Active Hip ABduction    Pre-Admission Information    ** Pre-admission information gained from family members in acute care setting **  Lives With: alone                     Type of Home: house  Home Layout: two level, basement with full flight  Home Access:  1 step to enter with handrail. Handrails are located on R side. Bathroom Layout: tub/shower unit, walk in shower--walk-in shower in basement; tub shower on main level. Pt typically uses walk-in shower downstairs. Bathroom Equipment:  no prior equipment  Toilet Height: standard height  Home Equipment: no prior equipment  Transfer Assistance: Independent without use of device  Ambulation Assistance:Independent without use of device  ADL Assistance: independent with all ADL's  IADL Assistance: independent with homemaking tasks--son fills medications; pt able to take them independently every morning. Active :        [x] Yes                 [] No  Hand Dominance: [] Left                 [x] Right  Current Employment: retired. Occupation: real estate  Hobbies: golf with son every weekend  Recent Falls: no recent falls. Examination   Vision:   Vision Corrective Device: wears glasses for reading  [facility]   [facility]      Subjective  General: Patient supine in bed with HOB elevated upon entry, agreeable to therapy. Pt remains disoriented to location and situation with no recall of injury, and unable retain situation within session despite frequent education and orientation. Patient had code violet called last night secondary to agitation, but agreeable to all therapy activities during both sessions. Pain: Patient does not rate upon questioning  Pain Interventions: Not applicable       Functional Mobility  Bed Mobility  Supine to Sit: supervision  Sit to Supine: supervision  Rolling Left: supervision  Rolling Right: supervision  Scooting: supervision  Bridging: supervision  Comments: All performed on traditional flat bed without HR.  Increase in pain with rolling right and performing supine to sit requiring supervision. Transfers  Sit to stand transfer: supervision  Stand to sit transfer: supervision  Stand step transfer: supervision  Car transfer: stand by assistance  Comments: All performed with use of RW. Continued posterior lean with standing but improving this date and no loss of balance. Ambulation  Surface:level surface  Assistive Device: rolling walker  Assistance: supervision  Distance: 80' + [address] + [de-identified]' + multiple small bouts throughout session  Gait Mechanics: Step-through pattern with mild forward flexed posture, increased UE reliance, and normal step length  Comments:  Occasional VC to push through (B) UE for maintenance of WB precautions  Stair Mobility  Stair mobility not completed on this date. Comments:  Wheelchair Mobility:   No w/c mobility completed on this date. Comments:  Balance  Static Sitting Balance: good: independent with functional balance in unsupported position  Dynamic Sitting Balance: fair (+): maintains balance at SBA/supervision without use of UE support  Static Standing Balance: fair (+): maintains balance at SBA/supervision without use of UE support  Dynamic Standing Balance: fair: maintains balance at CGA without use of UE support  Comments:    Other Therapeutic Interventions   First session:   See functional mobility above. In addition patient completes cone stack to 3-way target by color on ground as well as cone retrieval from ground 1 x 9 at [facility] without UE support. Dynamic standing activities performed at CGA 2 x 10 ea without UE support and 2.4# weighted ball: overhead press on normal ground and airex, paloff press on airex. Increased hip strategy utilized for balance correction when standing on airex. (L) LE ascending/descending step taps on 4 in step (tapping up 3/down 3) with (B) HR 2 x 10. Second session:     Patient seated in recliner upon entry, agreeable to therapy.  All transfers performed this session with RW at supervision. Patient ambulated 220 + 80 ft this session at supervision with use of RW. In addition, seated stepper performed L1.5 for 6 mins with VC to increase use of UE for maintenance of WB precautions. Dynamic standing activities at [facility]: forward/backward targeted taps (L) LE without UE support 2 x 10, ball bounce standing on airex without UE support 1 x 20. No LOB occurred. Patient returned to recliner with chair alarm in place, call light within reach, and sitter present. Ice applied to (L) hip at end of session. Cognition  Overall Cognitive Status: Impaired  Arousal/Alterness: appropriate responses to stimuli  Following Commands: follows one step commands consistently  Attention Span: attends with cues to redirect, difficulty dividing attention  Memory: decreased recall of precautions, decreased short term memory, decreased long term memory  Safety Judgement: decreased awareness of need for assistance, decreased awareness of need for safety  Problem Solving: assistance required to generate solutions, assistance required to implement solutions, decreased awareness of errors  Insights: decreased awareness of deficits  Initiation: requires cues for some  Sequencing: requires cues for some  Comments: Improving impulsivity with frequent VC for reorientation to surroundings. Patient carryover within conversation/directions less than 5 minute before requiring repetition including recall of hip fracture.   Command Following:   impaired    Education  Barriers To Learning: cognition  Patient Education: patient educated on goals, PT role and benefits, plan of care, precautions, weight-bearing education, general safety, functional mobility training, proper use of assistive device/equipment, orientation, transfer training  Learning Assessment:  patient will require reinforcement due to cognitive deficits    Assessment  Activity Tolerance: Limited by pain and cognitive deficits with no carryover of education/situation. Impairments Requiring Therapeutic Intervention: decreased functional mobility, decreased ADL status, decreased ROM, decreased strength, decreased safety awareness, decreased cognition, decreased endurance, decreased balance, increased pain, decreased posture  Prognosis: fair  Clinical Assessment: Patient functional mobility continues improving as evidenced by progressing from SBA to supervision with transfers and ambulation. However, sessions continue to be limited by cognitive deficits requiring frequent reorientation. Patient improving ability to perform standing activities with decreased UE reliance demonstrating improvements in standing balance. Patient would continue to benefit from skilled therapy services in order to promote functional independence and improved mobility. Safety Interventions: patient left in chair, chair alarm in place, call light within reach, gait belt, telesitter in use, and sitter present    Plan  Frequency: 5 x/week, 75 min/day  Current Treatment Recommendations: strengthening, ROM, balance training, functional mobility training, transfer training, gait training, stair training, endurance training, neuromuscular re-education, modalities, patient/caregiver education, ADL/self-care training, cognitive reorientation, pain management, and safety education    Goals  Patient Goals:  To return home   Short Term Goals:  Time Frame: 2 weeks  Patient will complete bed mobility at modified independent   Patient will complete transfers at [facility]   Patient will ambulate 150 ft with use of rolling walker at modified independent  Patient will ascend/descend 4 stairs with (R) ascending handrail at modified independent  Patient will complete car transfer at [facility]    Therapy Session Time  First Session Therapy Time:   Individual Concurrent Group Co-treatment   Time In  0832         Time Out  0932         Minutes  60           Second Session Therapy Time:   Individual Concurrent Group Co-treatment   Time In  1115         Time Out  1147         Minutes  32           Timed Code Treatment Minutes:  60 + 32 minutes    Total Treatment Minutes:   92 minutes      Electronically Signed By:     WENDY Ba  Therapist observed and directed the patient's plan of care.   Co-signed and supervised by: Mandie Lau PT, DPT - [id number]
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
## 2023-01-21 PROCEDURE — 1280000000 HC REHAB R&B

## 2023-01-21 PROCEDURE — 6370000000 HC RX 637 (ALT 250 FOR IP): Performed by: PHYSICAL MEDICINE & REHABILITATION

## 2023-01-21 PROCEDURE — 6360000002 HC RX W HCPCS: Performed by: PHYSICAL MEDICINE & REHABILITATION

## 2023-01-21 PROCEDURE — 94760 N-INVAS EAR/PLS OXIMETRY 1: CPT

## 2023-01-21 RX ADMIN — STANDARDIZED SENNA CONCENTRATE AND DOCUSATE SODIUM 2 TABLET: 8.6; 5 TABLET ORAL at 10:28

## 2023-01-21 RX ADMIN — TRAZODONE HYDROCHLORIDE 50 MG: 50 TABLET ORAL at 21:12

## 2023-01-21 RX ADMIN — MEMANTINE 5 MG: 5 TABLET ORAL at 10:28

## 2023-01-21 RX ADMIN — ACETAMINOPHEN 1000 MG: 500 TABLET ORAL at 10:28

## 2023-01-21 RX ADMIN — LISINOPRIL 10 MG: 10 TABLET ORAL at 10:28

## 2023-01-21 RX ADMIN — ACETAMINOPHEN 1000 MG: 500 TABLET ORAL at 21:12

## 2023-01-21 RX ADMIN — DONEPEZIL HYDROCHLORIDE 10 MG: 5 TABLET, FILM COATED ORAL at 21:12

## 2023-01-21 RX ADMIN — DORZOLAMIDE HYDROCHLORIDE 1 DROP: 20 SOLUTION/ DROPS OPHTHALMIC at 21:12

## 2023-01-21 RX ADMIN — QUETIAPINE FUMARATE 25 MG: 25 TABLET ORAL at 21:13

## 2023-01-21 RX ADMIN — ACETAMINOPHEN 1000 MG: 500 TABLET ORAL at 15:35

## 2023-01-21 RX ADMIN — ENOXAPARIN SODIUM 40 MG: 100 INJECTION SUBCUTANEOUS at 21:12

## 2023-01-21 RX ADMIN — MEMANTINE 5 MG: 5 TABLET ORAL at 21:12

## 2023-01-21 RX ADMIN — DORZOLAMIDE HYDROCHLORIDE 1 DROP: 20 SOLUTION/ DROPS OPHTHALMIC at 10:31

## 2023-01-21 RX ADMIN — STANDARDIZED SENNA CONCENTRATE AND DOCUSATE SODIUM 2 TABLET: 8.6; 5 TABLET ORAL at 21:12

## 2023-01-21 ASSESSMENT — PAIN SCALES - WONG BAKER
WONGBAKER_NUMERICALRESPONSE: 0
WONGBAKER_NUMERICALRESPONSE: 0

## 2023-01-21 ASSESSMENT — PAIN DESCRIPTION - PAIN TYPE
TYPE: ACUTE PAIN
TYPE: ACUTE PAIN

## 2023-01-21 ASSESSMENT — PAIN DESCRIPTION - DESCRIPTORS
DESCRIPTORS: ACHING;DISCOMFORT;SORE;THROBBING
DESCRIPTORS: ACHING;DISCOMFORT

## 2023-01-21 ASSESSMENT — PAIN SCALES - GENERAL
PAINLEVEL_OUTOF10: 5
PAINLEVEL_OUTOF10: 8
PAINLEVEL_OUTOF10: 5

## 2023-01-21 ASSESSMENT — PAIN DESCRIPTION - LOCATION
LOCATION: HIP
LOCATION: HIP

## 2023-01-21 ASSESSMENT — PAIN DESCRIPTION - ORIENTATION
ORIENTATION: LEFT
ORIENTATION: LEFT

## 2023-01-21 ASSESSMENT — PAIN DESCRIPTION - FREQUENCY
FREQUENCY: CONTINUOUS
FREQUENCY: CONTINUOUS

## 2023-01-21 ASSESSMENT — PAIN DESCRIPTION - ONSET
ONSET: ON-GOING
ONSET: ON-GOING

## 2023-01-21 NOTE — PROGRESS NOTES
Patient alert to self and place. Believes he is going home tomorrow. Needs frequent redirection and interaction. Patient gets extremely anxious worrying about his car, clothes and  what is going on at home. Sitter remains at bedside with camera in room and operating. Assessment completed, vss, schedule meds given,   Pt remains free from falls. Fall precautions in place--bed in lowest position, call light within reach, bed alarm in use. Pt aware to call for assistance before getting up.

## 2023-01-21 NOTE — PLAN OF CARE
Problem: Discharge Planning  Goal: Discharge to home or other facility with appropriate resources  1/20/2023 2234 by Cassia Michael RN  Outcome: Progressing  Flowsheets (Taken 1/20/2023 2115)  Discharge to home or other facility with appropriate resources: Identify barriers to discharge with patient and caregiver  1/20/2023 1017 by Brenna Kang RN  Outcome: Progressing     Problem: Pain  Goal: Verbalizes/displays adequate comfort level or baseline comfort level  1/20/2023 2234 by Cassia Michael RN  Outcome: Progressing  1/20/2023 1017 by Brenna Kang RN  Outcome: Progressing     Problem: Skin/Tissue Integrity  Goal: Absence of new skin breakdown  Description: 1. Monitor for areas of redness and/or skin breakdown  2. Assess vascular access sites hourly  3. Every 4-6 hours minimum:  Change oxygen saturation probe site  4. Every 4-6 hours:  If on nasal continuous positive airway pressure, respiratory therapy assess nares and determine need for appliance change or resting period.   1/20/2023 2234 by Cassia Michael RN  Outcome: Progressing  1/20/2023 1017 by Brenna Kang RN  Outcome: Progressing     Problem: Safety - Adult  Goal: Free from fall injury  1/20/2023 2234 by Cassia Michael RN  Outcome: Progressing  Flowsheets (Taken 1/20/2023 2230)  Free From Fall Injury: Instruct family/caregiver on patient safety  1/20/2023 1017 by Brenna Kang RN  Outcome: Progressing     Problem: ABCDS Injury Assessment  Goal: Absence of physical injury  1/20/2023 2234 by Cassia Michael RN  Outcome: Progressing  Flowsheets (Taken 1/20/2023 2230)  Absence of Physical Injury: Implement safety measures based on patient assessment  1/20/2023 1017 by Brenna Kang RN  Outcome: Progressing

## 2023-01-21 NOTE — PROGRESS NOTES
Tomi Kayleen  1/21/2023  [id number]    Chief Complaint: Closed fracture of left femur, sequela    Subjective:   Struggling with pain control this morning; no issues overnight. ROS: No CP, SOB, dyspnea    Objective:  Patient Vitals for the past 24 hrs:   BP Temp Temp src Pulse Resp SpO2   01/21/23 1008 138/73 97.7 °F (36.5 °C) Oral 57 16 99 %   01/20/23 2115 (!) 150/71 97.2 °F (36.2 °C) Oral 66 16 96 %       Gen: No distress, pleasant. Resting in bed  HEENT: Normocephalic, atraumatic. CV: Regular rate and rhythm. No MRG   Resp: No respiratory distress. CTAB   Abd: Soft, nontender, nondistended  Ext: No edema. Neuro: Alert, oriented, appropriately interactive. Higher level cognitive deficits present    Laboratory data: Available via EMR. Therapy progress:    PT    Supine to Sit: Supervision or touching assistance  Sit to Supine: Supervision or touching assistance   Sit to Stand: Supervision or touching assistance  Chair/Bed to Chair Transfer: Supervision or touching assistance  Car Transfer: Supervision or touching assistance  Ambulation 10 ft: Supervision or touching assistance  Ambulation 50 ft: Supervision or touching assistance  Ambulation 150 ft: Supervision or touching assistance  Stairs - 1 Step: Partial/moderate assistance  Stairs - 4 Step: Partial/moderate assistance  Stairs - 12 Step:      OT    Eating: Setup or clean-up assistance  Oral Hygiene: Setup or clean-up assistance  Bathing: Supervision or touching assistance  Upper Body Dressing: Setup or clean-up assistance  Lower Body Dressing: Partial/moderate assistance  Toilet Transfer: Supervision or touching assistance  Toilet Hygiene: Supervision or touching assistance    Speech Therapy    Pt continues to present with Moderate  and Severe  Cognitive-Linguistic Deficits . Difficulty/limitations persist with short term memory and fx carryover recalling what happened and where he is.  Pt with intermittent agitation and willingness to participate. This date pt receptive to completing the Brief Cognitive Rating Scale (BCRS) interview questions (see above) with pt falling within the moderate cognitive decline (mild dementia) range on the Global Deterioration Scale or Assessment of Primary Degenerative Dementia (see above). If pt continues to show increased agitation and does not wish to participate will d/c from speech therapy caseload. Continue to target fx short term memory and problem solving to facilitate safe return to prior level of independence. Body mass index is 28.94 kg/m². Assessment:  Patient Active Problem List   Diagnosis    Benign prostatic hyperplasia without urinary obstruction    Mixed hyperlipidemia    Bradycardia    Primary hypertension    Pre-diabetes    Primary osteoarthritis of right knee    Memory impairment    Nipple soreness    Skin lesion on examination    Late onset Alzheimer's dementia without behavioral disturbance (HCC)    Vitamin D insufficiency    Nondisplaced fracture of greater trochanter of left femur, initial encounter for closed fracture (HCC)    Unable to ambulate    Alzheimer's dementia ([facility] [address]    Imbalance    Ataxia    Closed fracture of left femur, sequela       Plan:   Left greater trochanter femur fracture: 50% WBAT in LLE. PT/OT. Pain control. Dementia: resumed namenda and aricept per home regimen. Seroquel 25 as needed     HTN: hold lisinopril 10 held. - Resumed      Bowels: Per protocol  Bladder: Per protocol   Sleep: Trazodone provided prn. Pain: tylenol scheduled, kelly PRN 5mg  DVT PPx: lovenox   ARSENIO: 1/26    Danyelle Singleton MD  1/21/2023, 10:36 AM    * This document was created using dictation software. While all precautions were taken to ensure accuracy, errors may have occurred. Please disregard any typographical errors.

## 2023-01-21 NOTE — PROGRESS NOTES
Pt ambulated using walker, gait belt and followed with wheelchair for safety. Pt walked 1 lap. Pt tolerated well not bearing complete weight on LLE. Pt assisted to restroom and back to chair. Pt chair alarm on and call light in reach.      Annemarie CRAIGN, RN   [phone]

## 2023-01-21 NOTE — PLAN OF CARE
Problem: Discharge Planning  Goal: Discharge to home or other facility with appropriate resources  1/21/2023 0737 by Henrique Turner RN  Outcome: Progressing  1/20/2023 2234 by Theresa Munguia RN  Outcome: Progressing     Problem: Pain  Goal: Verbalizes/displays adequate comfort level or baseline comfort level  1/21/2023 0737 by Henrique Turenr RN  Outcome: Progressing  1/20/2023 2234 by Theresa Munguia RN  Outcome: Progressing     Problem: Skin/Tissue Integrity  Goal: Absence of new skin breakdown  1/21/2023 0737 by Henrique Turner RN  Outcome: Progressing  1/20/2023 2234 by Theresa Munguia RN  Outcome: Progressing     Problem: Safety - Adult  Goal: Free from fall injury  1/21/2023 0737 by Henrique Turner RN  Outcome: Progressing  1/20/2023 2234 by Theresa Munguia RN  Outcome: Progressing     Problem: ABCDS Injury Assessment  Goal: Absence of physical injury  1/21/2023 0737 by Henrique Turner RN  Outcome: Progressing  1/20/2023 2234 by Theresa Munguia RN  Outcome: Progressing

## 2023-01-21 NOTE — PROGRESS NOTES
Pt in bed resting. Pt turned on R side. Call light in reach. Sitter at bedside for safety, confusion and impulsive behaviors when awake due to impaired cognition.      Gerard Mckeon BSN, RN   [phone]

## 2023-01-22 VITALS
TEMPERATURE: 97.2 F | BODY MASS INDEX: 29.05 KG/M2 | HEIGHT: 71 IN | WEIGHT: 207.5 LBS | SYSTOLIC BLOOD PRESSURE: 154 MMHG | OXYGEN SATURATION: 94 % | RESPIRATION RATE: 16 BRPM | DIASTOLIC BLOOD PRESSURE: 77 MMHG | HEART RATE: 65 BPM

## 2023-01-22 PROCEDURE — 1280000000 HC REHAB R&B

## 2023-01-22 PROCEDURE — 94760 N-INVAS EAR/PLS OXIMETRY 1: CPT

## 2023-01-22 PROCEDURE — 6370000000 HC RX 637 (ALT 250 FOR IP): Performed by: PHYSICAL MEDICINE & REHABILITATION

## 2023-01-22 PROCEDURE — 6360000002 HC RX W HCPCS: Performed by: PHYSICAL MEDICINE & REHABILITATION

## 2023-01-22 RX ADMIN — OXYCODONE 5 MG: 5 TABLET ORAL at 08:39

## 2023-01-22 RX ADMIN — ACETAMINOPHEN 1000 MG: 500 TABLET ORAL at 08:39

## 2023-01-22 RX ADMIN — MEMANTINE 5 MG: 5 TABLET ORAL at 08:39

## 2023-01-22 RX ADMIN — QUETIAPINE FUMARATE 25 MG: 25 TABLET ORAL at 22:02

## 2023-01-22 RX ADMIN — ACETAMINOPHEN 1000 MG: 500 TABLET ORAL at 22:02

## 2023-01-22 RX ADMIN — DORZOLAMIDE HYDROCHLORIDE 1 DROP: 20 SOLUTION/ DROPS OPHTHALMIC at 22:07

## 2023-01-22 RX ADMIN — DONEPEZIL HYDROCHLORIDE 10 MG: 5 TABLET, FILM COATED ORAL at 22:02

## 2023-01-22 RX ADMIN — ACETAMINOPHEN 1000 MG: 500 TABLET ORAL at 17:36

## 2023-01-22 RX ADMIN — STANDARDIZED SENNA CONCENTRATE AND DOCUSATE SODIUM 2 TABLET: 8.6; 5 TABLET ORAL at 22:02

## 2023-01-22 RX ADMIN — DORZOLAMIDE HYDROCHLORIDE 1 DROP: 20 SOLUTION/ DROPS OPHTHALMIC at 08:39

## 2023-01-22 RX ADMIN — LISINOPRIL 10 MG: 10 TABLET ORAL at 08:39

## 2023-01-22 RX ADMIN — ENOXAPARIN SODIUM 40 MG: 100 INJECTION SUBCUTANEOUS at 22:04

## 2023-01-22 RX ADMIN — MEMANTINE 5 MG: 5 TABLET ORAL at 22:02

## 2023-01-22 RX ADMIN — STANDARDIZED SENNA CONCENTRATE AND DOCUSATE SODIUM 2 TABLET: 8.6; 5 TABLET ORAL at 08:39

## 2023-01-22 ASSESSMENT — PAIN DESCRIPTION - DESCRIPTORS
DESCRIPTORS: ACHING
DESCRIPTORS: ACHING

## 2023-01-22 ASSESSMENT — PAIN SCALES - GENERAL
PAINLEVEL_OUTOF10: 3
PAINLEVEL_OUTOF10: 6
PAINLEVEL_OUTOF10: 2
PAINLEVEL_OUTOF10: 6

## 2023-01-22 ASSESSMENT — PAIN DESCRIPTION - ORIENTATION
ORIENTATION: LEFT
ORIENTATION: LEFT

## 2023-01-22 ASSESSMENT — PAIN DESCRIPTION - ONSET: ONSET: ON-GOING

## 2023-01-22 ASSESSMENT — PAIN DESCRIPTION - LOCATION
LOCATION: LEG

## 2023-01-22 ASSESSMENT — PAIN - FUNCTIONAL ASSESSMENT: PAIN_FUNCTIONAL_ASSESSMENT: ACTIVITIES ARE NOT PREVENTED

## 2023-01-22 ASSESSMENT — PAIN DESCRIPTION - PAIN TYPE: TYPE: ACUTE PAIN

## 2023-01-22 ASSESSMENT — PAIN DESCRIPTION - FREQUENCY: FREQUENCY: CONTINUOUS

## 2023-01-22 NOTE — PROGRESS NOTES
Assessment complete see flowsheets. A&O to self. Meds given per eMAR. Patient is In bed, fall precautions in place, hourly rounding, call light and belongings in reach, bed in lowest position, wheels locked in place, side rails up x 2, walkways free of clutter. Sitter is at bedside. No further needs expressed at this time. Patient was very confused and verbally combative using profanities while asking repeating questions about where his car was, what's going to happen with his house, why he is in the hospital, how did he injure his leg and where did he injure his leg, where is clothes, keys and wallet was. Redirected patient every time and spent a lot of time in patient's room asking patient to not be verbally combative with the sitter. Patient's daughter called requesting that we do not give Roxicodone which is the only pain medication available as needed because she read that it causes his dementia and agitation to get worse. Explained to patient's daughter that the patient's injury is very painful for him and currently what we have is an ice pack to help alleviate some pain. Patient seems to become more agitated when he is in discomfort. Patient's daughter still insists that we do not give the patient Roxicodone and to give him only tylenol. Explained to the daughter that the next dose of tylenol is not until 9 am. Pt daughter continued to state she was just calling to check to see how he was doing. I explained that we were working on getting the pain under control so the patient can sleep. Daughter ended call.  Patient fell asleep around 1am.

## 2023-01-22 NOTE — PROGRESS NOTES
Pt's son Isaac Wiley came to visit with pt. Pt's mood brighter during visit. Pt's son asked how pt's pain has been. Pt continually between 5-8, when pt answered pain level. Per Miladis Cordoba., pt's son he would like for his father to have pain medication if needed to control his fathers pain level.      Felicia CRAIGN, RN   [phone]

## 2023-01-23 LAB
ANION GAP SERPL CALCULATED.3IONS-SCNC: 9 MMOL/L (ref 3–16)
BUN BLDV-MCNC: 23 MG/DL (ref 7–20)
CALCIUM SERPL-MCNC: 8.4 MG/DL (ref 8.3–10.6)
CHLORIDE BLD-SCNC: 108 MMOL/L (ref 99–110)
CO2: 24 MMOL/L (ref 21–32)
CREAT SERPL-MCNC: 0.8 MG/DL (ref 0.8–1.3)
GFR SERPL CREATININE-BSD FRML MDRD: >60 ML/MIN/{1.73_M2}
GLUCOSE BLD-MCNC: 91 MG/DL (ref 70–99)
HCT VFR BLD CALC: 31.4 % (ref 40.5–52.5)
HEMOGLOBIN: 10.7 G/DL (ref 13.5–17.5)
MCH RBC QN AUTO: 31 PG (ref 26–34)
MCHC RBC AUTO-ENTMCNC: 33.9 G/DL (ref 31–36)
MCV RBC AUTO: 91.4 FL (ref 80–100)
PDW BLD-RTO: 12.8 % (ref 12.4–15.4)
PLATELET # BLD: 251 K/UL (ref 135–450)
PMV BLD AUTO: 7.9 FL (ref 5–10.5)
POTASSIUM SERPL-SCNC: 4 MMOL/L (ref 3.5–5.1)
RBC # BLD: 3.44 M/UL (ref 4.2–5.9)
SODIUM BLD-SCNC: 141 MMOL/L (ref 136–145)
WBC # BLD: 5.7 K/UL (ref 4–11)

## 2023-01-23 PROCEDURE — 97116 GAIT TRAINING THERAPY: CPT

## 2023-01-23 PROCEDURE — 80048 BASIC METABOLIC PNL TOTAL CA: CPT

## 2023-01-23 PROCEDURE — 97530 THERAPEUTIC ACTIVITIES: CPT

## 2023-01-23 PROCEDURE — 1280000000 HC REHAB R&B

## 2023-01-23 PROCEDURE — 85027 COMPLETE CBC AUTOMATED: CPT

## 2023-01-23 PROCEDURE — 97110 THERAPEUTIC EXERCISES: CPT

## 2023-01-23 PROCEDURE — 97130 THER IVNTJ EA ADDL 15 MIN: CPT

## 2023-01-23 PROCEDURE — 97535 SELF CARE MNGMENT TRAINING: CPT

## 2023-01-23 PROCEDURE — 36415 COLL VENOUS BLD VENIPUNCTURE: CPT

## 2023-01-23 PROCEDURE — 6370000000 HC RX 637 (ALT 250 FOR IP): Performed by: PHYSICAL MEDICINE & REHABILITATION

## 2023-01-23 PROCEDURE — 6360000002 HC RX W HCPCS: Performed by: PHYSICAL MEDICINE & REHABILITATION

## 2023-01-23 PROCEDURE — 97129 THER IVNTJ 1ST 15 MIN: CPT

## 2023-01-23 RX ORDER — TRAMADOL HYDROCHLORIDE 50 MG/1
50 TABLET ORAL EVERY 6 HOURS PRN
Status: DISCONTINUED | OUTPATIENT
Start: 2023-01-23 | End: 2023-01-26 | Stop reason: HOSPADM

## 2023-01-23 RX ADMIN — DORZOLAMIDE HYDROCHLORIDE 1 DROP: 20 SOLUTION/ DROPS OPHTHALMIC at 10:13

## 2023-01-23 RX ADMIN — ACETAMINOPHEN 1000 MG: 500 TABLET ORAL at 21:43

## 2023-01-23 RX ADMIN — STANDARDIZED SENNA CONCENTRATE AND DOCUSATE SODIUM 2 TABLET: 8.6; 5 TABLET ORAL at 10:13

## 2023-01-23 RX ADMIN — DIPHENHYDRAMINE HYDROCHLORIDE 25 MG: 25 TABLET ORAL at 01:24

## 2023-01-23 RX ADMIN — QUETIAPINE FUMARATE 25 MG: 25 TABLET ORAL at 10:12

## 2023-01-23 RX ADMIN — TRAMADOL HYDROCHLORIDE 50 MG: 50 TABLET ORAL at 14:07

## 2023-01-23 RX ADMIN — ENOXAPARIN SODIUM 40 MG: 100 INJECTION SUBCUTANEOUS at 21:44

## 2023-01-23 RX ADMIN — MEMANTINE 5 MG: 5 TABLET ORAL at 10:13

## 2023-01-23 RX ADMIN — STANDARDIZED SENNA CONCENTRATE AND DOCUSATE SODIUM 2 TABLET: 8.6; 5 TABLET ORAL at 21:43

## 2023-01-23 RX ADMIN — QUETIAPINE FUMARATE 25 MG: 25 TABLET ORAL at 21:44

## 2023-01-23 RX ADMIN — DORZOLAMIDE HYDROCHLORIDE 1 DROP: 20 SOLUTION/ DROPS OPHTHALMIC at 21:45

## 2023-01-23 RX ADMIN — LISINOPRIL 10 MG: 10 TABLET ORAL at 10:13

## 2023-01-23 RX ADMIN — MEMANTINE 5 MG: 5 TABLET ORAL at 21:44

## 2023-01-23 RX ADMIN — ACETAMINOPHEN 1000 MG: 500 TABLET ORAL at 14:07

## 2023-01-23 RX ADMIN — DONEPEZIL HYDROCHLORIDE 10 MG: 5 TABLET, FILM COATED ORAL at 21:44

## 2023-01-23 ASSESSMENT — PAIN DESCRIPTION - ORIENTATION
ORIENTATION: LEFT
ORIENTATION: RIGHT

## 2023-01-23 ASSESSMENT — PAIN DESCRIPTION - LOCATION
LOCATION: LEG
LOCATION: HIP

## 2023-01-23 ASSESSMENT — PAIN SCALES - GENERAL
PAINLEVEL_OUTOF10: 5
PAINLEVEL_OUTOF10: 0
PAINLEVEL_OUTOF10: 5

## 2023-01-23 ASSESSMENT — PAIN DESCRIPTION - PAIN TYPE: TYPE: ACUTE PAIN

## 2023-01-23 ASSESSMENT — PAIN DESCRIPTION - ONSET: ONSET: ON-GOING

## 2023-01-23 ASSESSMENT — PAIN DESCRIPTION - FREQUENCY: FREQUENCY: CONTINUOUS

## 2023-01-23 ASSESSMENT — PAIN - FUNCTIONAL ASSESSMENT: PAIN_FUNCTIONAL_ASSESSMENT: ACTIVITIES ARE NOT PREVENTED

## 2023-01-23 ASSESSMENT — PAIN DESCRIPTION - DESCRIPTORS
DESCRIPTORS: DISCOMFORT
DESCRIPTORS: ACHING

## 2023-01-23 NOTE — PROGRESS NOTES
[facility]   Phone: [phone]    Occupational Therapy    [] Initial Evaluation            [x] Daily Treatment Note         [] Discharge Summary      Patient: Shai Fuchs   : 1935   MRN: [id number]   Date of Service:  2023    Admitting Diagnosis:  Closed fracture of left femur, sequela  Current Admission Summary: 52-year-old male with a history of BPH, COPD, HTN, HLD, and dementia who was admitted on  with left hip pain after a fall. Work-up revealed a greater trochanter fracture of the left femur. Ortho evaluated and suggested 50% weightbearing on the left lower extremity. He was evaluated by therapy and suggested to continue in an inpatient setting prior to returning home. He was previously living independently at home with the assistance of family. His evening was complicated by increased confusion requiring the placement of a sitter. He remains pleasantly confused this morning. Past Medical History:  has a past medical history of Benign prostatic hypertrophy without urinary obstruction, Bradycardia, COPD (chronic obstructive pulmonary disease) ([address] Dementia ([address] Glaucoma, Gout, Hyperlipidemia, Hypertension, Hypoxemia, Macular degeneration disease, Osteoarthritis, Sleep apnea, and Unspecified sleep apnea. Past Surgical History:  has a past surgical history that includes Total hip arthroplasty (2005); joint replacement; TURP; knee surgery; and Colonoscopy.     Discharge Recommendations: HHOT S3- 24 hour supervision and assist     DME Required For Discharge: DME to be determined pending patient progress, rolling walker, shower chair with back    Precautions/Restrictions: high fall risk, weight bearing, ROM restrictions  Weight Bearing Restrictions: partial weight bearing - 50 %  [] Right Upper Extremity  [] Left Upper Extremity [] Right Lower Extremity  [x] Left Lower Extremity     Required Braces/Orthotics: no braces required   [] Right  [] Left  Positional Restrictions: no active hip abduction     Pre-Admission Information   Lives With: alone                     Type of Home: house  Home Layout: two level, basement with full flight  Home Access:  1 step to enter with handrail. Handrails are located on R side. Bathroom Layout: tub/shower unit, walk in shower--walk-in shower in basement; tub shower on main level. Pt typically uses walk-in shower downstairs. Bathroom Equipment:  no prior equipment  Toilet Height: standard height  Home Equipment: no prior equipment  Transfer Assistance: Independent without use of device  Ambulation Assistance:Independent without use of device  ADL Assistance: independent with all ADL's  IADL Assistance: independent with homemaking tasks--son fills medications; pt able to take them independently every morning. Active :        [x] Yes                 [] No  Hand Dominance: [] Left                 [x] Right  Current Employment: retired. Occupation: real estate  Hobbies: golf with son every weekend  Recent Falls: no recent falls. Note: Social functional information acquired from acute evaluation on 1/12. Pt has baseline dementia. Daughter states, Maikol Storm is functional but forgetful\". She indicates his confusion has been exacerbated since his admission due to unfamiliar setting and events of hospital course. Subjective  General: Patient in recliner upon arrival with sitter present and dtr present. Patient agreeable to OT session. Pt pleasant, agreeable, and cooperative throughout therapy session. Pain: Patient does not rate upon questioning patient reporting left leg is sore but does not rate  Pain Interventions: patient denies pain interventions      Activities of Daily Living  Basic Activities of Daily Living  Grooming: stand by assistance  Grooming Comments: oral care in stance  Toileting: stand by assistance.     Toileting Comments: voided urine and had a BM, RN notified  Instrumental Activities of Daily Living  No IADL completed on this date. Functional Mobility  Bed Mobility  Bed mobility not completed on this date. Transfers  Sit to stand transfer:stand by assistance  Stand to sit transfer: stand by assistance  Toilet transfer: stand by assistance  Toilet transfer equipment: raised toilet seat with rails, grab bars, walker  Comments: to/from recliner, EOB, shower chair, and wc, verbal cues for sequencing to ensure adherence to WB precautions  Functional Mobility:  Sitting Balance: supervision. Sitting Balance Comment: recliner  Standing Balance: stand by assistance. Standing Balance Comment: with RW or GB   Functional Mobility: .  stand by assistance  Functional Mobility Activity: to/from bathroom, to/from therapy room  Functional Mobility Device Use: rolling walker  Functional Mobility Comment: no LOB noted, cues for WB precautions, c/o pain   Therapeutic Activity   While seated in chair, pt used 6# weight to complete UB strengthening and endurance training to improve independence and endurance during ADLs of choice: shoulder flexion/extension, horizontal abduction/adduction, and bicep curl. Pt tolerated task well with three seated rest breaks. Pt educated on the purpose of there ex to improve strength and endurance for ADLs of choice, pt verbalized understanding and was receptive to OT education. Second Session  Pt seated upright in recliner upon OT arrival, agreeable to therapy. Sitter present. Pt pleasant and cooperative throughout. Pt c/o pain throughout and requesting pain medication. RN notified.    Basic Activities of Daily Living  Grooming: stand by assistance  Grooming Comments: seated for shaving face, mod cues for redirection   Upper Extremity Bathing: stand by assistance  Lower Extremity Bathing: stand by assistance   Bathing Comments: seated on shower chair with use of hand held shower head, max cues for thoroughness and use of LHS and soap  Upper Extremity Dressing: stand by assistance  Lower Extremity Dressing: minimal assistance  Dressing Comments: seated on shower chair to doff clothing, once he finished doffing clothes, pt stated he had to use the restroom, pt then urinated and returned to shower chair for bathing, pt seated on EOB with min A for dressing  Toileting: stand by assistance. Toileting Comments: urinated in stance with hand support  Transfers  Sit to stand transfer:contact guard assistance  Stand to sit transfer: contact guard assistance  Toilet transfer comments: urinated in stance with hand on GB behind toilet  Shower transfer: minimal assistance  Shower transfer equipment: shower seat with back, grab bars, walker  Comments: impulsive, max cues for safety  Functional Mobility:  Sitting Balance: stand by assistance. Sitting Balance Comment: max cues for reorientation  Standing Balance: stand by assistance, contact guard assistance. Standing Balance Comment: with hand support-SBA, with no hand support-CGA  Functional Mobility: .  contact guard assistance  Functional Mobility Activity: to/from bathroom, in bathroom  Functional Mobility Device Use: rolling walker   Pt left in recliner chair with all needs met, call light within reach, and chair alarm activated. Provided with ice pack at end of session due to pain.      Cognition  Overall Cognitive Status: Impaired  Arousal/Alterness: appropriate responses to stimuli  Following Commands: follows one step commands consistently  Attention Span: difficulty dividing attention  Memory: decreased recall of precautions, decreased recall of recent events, decreased short term memory  Safety Judgement: decreased awareness of need for assistance, decreased awareness of need for safety  Problem Solving: assistance required to generate solutions, assistance required to implement solutions, decreased awareness of errors  Insights: decreased awareness of deficits  Initiation: requires cues for some  Sequencing: requires cues for some  Comments: baseline dementia but increased confusion during hospital stay   Orientation:    oriented to person, disoriented to place, disoriented to time , and disoriented to situation  Command Following:   accurately follows one step commands  Education  Barriers To Learning: cognition  Patient Education: patient educated on goals, OT role and benefits, plan of care, precautions, ADL adaptive strategies, weight-bearing education, orientation, disease specific education, transfer training, discharge recommendations  Learning Assessment:  patient will require reinforcement due to cognitive deficits  Assessment  Activity Tolerance: fair-limited by cognition and pain, however improving compared to previous sessions  Impairments Requiring Therapeutic Intervention: decreased functional mobility, decreased ADL status, decreased strength, decreased safety awareness, decreased cognition, decreased endurance, decreased balance, decreased IADL, increased pain, decreased posture  Prognosis: good  Clinical Assessment: Pt is progressing fairly towards goals. Patient continues to require min assistance LB dressing however was able to complete LB bathing with SBA at this date with long handles sponge with cues. Patient limited by pain and confusion but agreeable to participate with therapy. Pt with poor carryover of learned information from previous sessions. Continue POC.   Safety Interventions: patient left in chair, chair alarm in place, call light within reach, patient at risk for falls, and sitter present    Plan  Frequency: 5 x/week, 75 min/day  Current Treatment Recommendations: strengthening, balance training, functional mobility training, transfer training, endurance training, patient/caregiver education, ADL/self-care training, IADL training, cognitive reorientation, safety education, and equipment evaluation/education  Goals  Patient Goals: to return to PLOF   Short Term Goals: Time Frame: 2 weeks   Patient will complete upper body ADL at modified independent   Patient will complete lower body ADL at modified independent   Patient will complete toileting at modified independent   Patient will complete functional transfers at modified independent   Patient to gather and transport IADL items at modified independent     -Goals not yet met this date      Therapy Session Time  First Session Therapy Time:   Individual Group Co-treatment   Time In 930     Time Out 1000     Minutes 30       Second Session Therapy Time:   Individual Group Co-treatment   Time In 1315     Time Out 1415     Minutes 60         Timed Code Treatment Minutes: 30+60    Total Treatment Minutes:  [address] [facility], [facility], [id number]

## 2023-01-23 NOTE — CARE COORDINATION
PAO/CESAR met with patient's daughter regarding patient's discharge plan. Patient discharging to the [facility]. ARU staff recommending that patient discharge to a setting where he will have support/supervision. Daughter stating that she will be with patient from noon until late evenings/night daily. Daughter stating that patient will have home care in place and if other services are needed family will assist.  ARU staff had questions rearding patient's POA status. PAO/CM checked patient's hard chart. POA documents are on the chart. Patient's daughter is his POA. PAO/CESAR will follow.     Electronically signed by SONDRA Woodall on 1/23/2023 at 12:10 PM

## 2023-01-23 NOTE — PATIENT CARE CONFERENCE
[facility]  Inpatient Rehabilitation  Weekly Team Conference Note    Patient Name: Royce Epperson        MRN: [id number]    : 1935  (81 y.o.)  Gender: male           The team conference for this patient was held on 23 at 11:00am and led by:  Kane Hernandes MD    CASE MANAGEMENT:  Assessment  Patient is a 81year old male who admitted to ARU on 2023 with the admitting diagnosis of Closed fracture of left femur, sequela. Patient will be residing in an independent living environment with supports when he discharges from  Patients children are supportive. Patient would benefit from skilled ARU PT/OT/SLP to promote increased safety and independence in order to return to his prior level of functioning. Patient anticipates discharging to home with home health care services and recommended DME. PHYSICAL THERAPY:    Bed Mobility  Supine to Sit: supervision  Sit to Supine: supervision  Rolling Left: supervision  Rolling Right: supervision  Scooting: supervision  Bridging: supervision  Comments: All performed on traditional flat bed without HR. Increase in pain with rolling right and performing supine to sit requiring supervision. Transfers  Sit to stand transfer: supervision  Stand to sit transfer: supervision  Stand step transfer: supervision  Comments: All performed with use of RW. Continues to utilize hinge mechanics during transition to stand secondary to pain but improving this date and no loss of balance.    Ambulation  Surface:level surface  Assistive Device: rolling walker  Assistance: supervision  Distance: 65' + 340' + multiple short bouts throughout session  Gait Mechanics: Step-through pattern with mild forward flexed posture, increased UE reliance, and normal step length  Stair Mobility   Pt ascends/descends 4 x 4\" inch steps with (B) HR at min (A)    QM:  Roll Left and Right  Assistance Needed: Supervision or touching assistance  Reason if not Attempted: Not attempted due to medical condition or safety concerns  CARE Score: 4  Discharge Goal: Independent  Sit to Lying  Assistance Needed: Supervision or touching assistance  CARE Score: 4  Discharge Goal: Independent  Lying to Sitting on Side of Bed  Assistance Needed: Supervision or touching assistance  CARE Score: 4  Discharge Goal: Independent  Sit to Stand  Assistance Needed: Supervision or touching assistance  CARE Score: 4  Discharge Goal: Independent  Chair/Bed-to-Chair Transfer  Assistance Needed: Supervision or touching assistance  CARE Score: 4  Discharge Goal: Independent  Car Transfer  Assistance Needed: Supervision or touching assistance  Reason if not Attempted: Not attempted due to medical condition or safety concerns  CARE Score: 4  Discharge Goal: Independent  Walk 10 Feet  Assistance Needed: Supervision or touching assistance  CARE Score: 4  Discharge Goal: Independent  Walk 50 Feet with Two Turns  Assistance Needed: Supervision or touching assistance  Reason if not Attempted: Not attempted due to medical condition or safety concerns  CARE Score: 4  Discharge Goal: Independent  Walk 150 Feet  Assistance Needed: Supervision or touching assistance  Reason if not Attempted: Not attempted due to medical condition or safety concerns  CARE Score: 4  Discharge Goal: Independent  Walking 10 Feet on Uneven Surfaces  Reason if not Attempted: Not attempted due to medical condition or safety concerns  CARE Score: 88  Discharge Goal: Independent  1 Step (Curb)  Assistance Needed: Partial/moderate assistance  Reason if not Attempted: Not attempted due to medical condition or safety concerns  CARE Score: 3  Discharge Goal: Independent  4 Steps  Assistance Needed: Partial/moderate assistance  Reason if not Attempted: Not attempted due to medical condition or safety concerns  CARE Score: 3  Discharge Goal: Independent  12 Steps  Reason if not Attempted: Not attempted due to medical condition or safety concerns  CARE Score: 88  Discharge Goal: Independent  Picking Up Object  Reason if not Attempted: Not attempted due to medical condition or safety concerns  CARE Score: 88  Discharge Goal: Independent  Wheelchair Ability  Uses a Wheelchair and/or Scooter?: Yes    Goals:                   Patient Goals: To return home   Short Term Goals:  Time Frame: 2 weeks  Patient will complete bed mobility at modified independent   Patient will complete transfers at [facility]   Patient will ambulate 150 ft with use of rolling walker at modified independent  Patient will ascend/descend 4 stairs with (R) ascending handrail at modified independent  Patient will complete car transfer at [facility]     Plan of Care: Pt to be seen 5 out of 7 days per week per ARU protocol ( 75 minutes with PT)                     SPEECH THERAPY:    Diet Level:ADULT DIET; Regular  ADULT ORAL NUTRITION SUPPLEMENT; Breakfast, Dinner; Standard High Calorie/High Protein Oral Supplement    Assessment: Impressions  Diagnosis: Pt continues to present with Moderate  and Severe  Cognitive-Linguistic Deficits . Difficulty/limitations persist with short term memory and fx carryover recalling what happened and where he is. Continue to target fx short term memory and problem solving to increase level of independence at next level of care. Pt is to be d/c'd to an independent living facility, suspect pt will need increased supports/ assistance due to reduced short term memory, poor carryover, and agitation. Goals:   Time Frame: 7-12 days     Pt will recall functional information (daily tasks, hip precautions, personal hx, etc.) with >70% accuracy with set up of cognitive aids. Limited progress due to reduced carryover   Patient will complete functional problem-solving tasks for daily situations with 80% accuracy, given min cues.  Limited progress   Patient will be instructed in memory strategies to utilize in order to promote recall of newly learned information with >70% min cues. Limited progress due to reduced carryover     Plan of Care:  Pt to be seen 5 out of 7 days per week per ARU protocol ( 30 minutes with SLP)    OCCUPATIONAL THERAPY:    ADL: Grooming: stand by assistance  Grooming Comments: seated for shaving face, mod cues for redirection   Upper Extremity Bathing: stand by assistance  Lower Extremity Bathing: stand by assistance   Bathing Comments: seated on shower chair with use of hand held shower head, max cues for thoroughness and use of LHS and soap  Upper Extremity Dressing: stand by assistance  Lower Extremity Dressing: minimal assistance  Dressing Comments: seated on shower chair to doff clothing, once he finished doffing clothes, pt stated he had to use the restroom, pt then urinated and returned to shower chair for bathing, pt seated on EOB with min A for dressing  Toileting: stand by assistance.     Toileting Comments: urinated in stance with hand support     Toilet Transfers:SBA/CGA     Tub/ShowerTransfers:CGA     QM:  Eating  Assistance Needed: Setup or clean-up assistance  CARE Score: 5  Discharge Goal: Independent  Oral Hygiene  Assistance Needed: Supervision or touching assistance  Reason if not Attempted: Patient refused  CARE Score: 4  Discharge Goal: [address] needed: Supervision or touching assistance  Reason if not Attempted: Patient refused  CARE Score: 4  Discharge Goal: Independent  Toilet Transfer  Assistance needed: Supervision or touching assistance  Comment: did not want to get up to toilet; instead, chose to use urinal in bed or while standing on stedy  Reason if not Attempted: Patient refused  CARE Score: 4  Discharge Goal: Independent  Shower/Bathe Self  Assistance Needed: Supervision or touching assistance  CARE Score: 4  Discharge Goal: Independent  Upper Body Dressing  Assistance Needed: Setup or clean-up assistance  CARE Score: 5  Discharge Goal: Independent  Lower Body Dressing  Assistance Needed: Partial/moderate assistance  CARE Score: 3  Discharge Goal: Independent  Putting On/Taking Off Footwear  Assistance Needed: Partial/moderate assistance  CARE Score: 3  Discharge Goal: Independent    Goals:             Short Term Goals:  Time Frame: 2 weeks   Patient will complete upper body ADL at modified independent   Patient will complete lower body ADL at modified independent   Patient will complete toileting at modified independent   Patient will complete functional transfers at modified independent   Patient to gather and transport IADL items at modified independent   These goals were reviewed with this patient at the time of assessment and Lashaun Banerjee is in agreement    Plan of Care:  Pt to be seen 5 out of 7 days per week per ARU protocol ( 75 minutes with OT)     NUTRITION:  Weight: 207 lb 8 oz (94.1 kg) / Body mass index is 28.94 kg/m². Diet Order:Regular    Supplements:Ensure BID    PO intake less than 25% of meals recently; drinking % supplements. No significant wt changes per EMR. Please see nutrition note for details. NURSING:    Risk for Readmission: 12%    Street Fall Risk Score: High  Wounds/Incisions/Ulcers: None  Medication Review: Reviewed daily with patient  Pain: Denies  Consultations/Labs/X-rays: Labs Monday and Thursday    Patient/Family Education provided by team:    Discharge Plan   Estimated Length of Stay:2 days  Destination: Home  Pass:No  Services at Discharge: HHOT/PT S3 and 24/7 SUP  Equipment at Discharge: [address] [facility] chair, hand held shower head, GB in shower, GB around toilet, rolling walker  Factors facilitating achievement of predicted outcomes: Motivated, Cooperative, and Pleasant  Barriers to the achievement of predicted outcomes: Confusion, Cognitive deficit, Limited safety awareness, Anxiety, and Limited insight into deficits    Patient Goals: To return to home and PLOF.     Interdisciplinary Individualized Plan of Care Review of Previous Week:    Medical and functional progress towards goals:  Pt doing ok, decreased narcotics due to confusion, pt still with agitation. Pt progressing to Mod I with transfers, supv/SBA for ambulation, ambulation distance increased, max cues for WBing status with stairs, pt progressing with ADLs, cues required for initiation and safety. Pt still with confusion and agitation, STM limited but, increased retention. Barriers towards progress:  Decreased balance, WBing status, Cognition impaired  Interventions to address Barriers:  PT focus on balance training, WBing cues from all staff, decreased narcotics  Goals still appropriate:  Yes  Modifications to goals: None  Continue Current Plan of Care:  Yes  Modifications to Plan of Care: None    Rehab Team Members in attendance for Team Conference:  Ancelmo Bonilla MSW, LSW    Peter Bassett RD, LD    Mary Villafana, OTR/L    Gwenetta Meckel, PT, DPT    Gibson Babinski, M.A., Cheryl Lucia, BSN, RN, [address] PT, DPT,     I approve the established interdisciplinary plan of care as documented within the medical record of [address].     Pardeep Gimenez MD   Electronically signed by Pardeep Gimenez MD on 1/24/2023 at 11:15 AM

## 2023-01-23 NOTE — PROGRESS NOTES
Nutrition Note    RECOMMENDATIONS  PO Diet: regular  ONS: Ensure BID    NUTRITION ASSESSMENT   Pt continues on a regular diet with Ensure BID. PO intake varies, overall eating well. Pt is consuming oral nutrition supplement, will continue to send. Weight is stable. Nutrition Related Findings: oriented to person/place only; able to feed self; 1/21 LBM; labs reviewed  Wounds: None  Nutrition Education:  Education not indicated   Nutrition Goals: PO intake 75% or greater     MALNUTRITION ASSESSMENT   Acute Illness  Malnutrition Status: No malnutrition      NUTRITION DIAGNOSIS   No nutrition diagnosis at this time       CURRENT NUTRITION THERAPIES  ADULT DIET; Regular  ADULT ORAL NUTRITION SUPPLEMENT; Breakfast, Dinner; Standard High Calorie/High Protein Oral Supplement     PO Intake: 51-75%   PO Supplement Intake:%      ANTHROPOMETRICS  Current Height: 5' 11\" (180.3 cm)  Current Weight: 207 lb 8 oz (94.1 kg)    Ideal Body Weight (IBW): 172 lbs  (78 kg)        BMI: 28.9        The patient will be monitored per nutrition standards of care. Consult dietitian if additional nutrition interventions are needed prior to RD reassessment.      Chirag Montalvo RD, LD    Contact: [phone]

## 2023-01-23 NOTE — PROGRESS NOTES
[facility]   Phone: [phone]    Speech Therapy    [] Initial Evaluation            [x] Daily Treatment Note         [] Discharge Summary      Patient: Brooke Jason   : 1935   MRN: [id number]   Date of Service:  2023  Admitting Diagnosis: Closed fracture of left femur, sequela  Current Admission Summary: 64-year-old male with a history of BPH, COPD, HTN, HLD, and dementia who was admitted on  with left hip pain after a fall. Work-up revealed a greater trochanter fracture of the left femur. Ortho evaluated and suggested 50% weightbearing on the left lower extremity. He was evaluated by therapy and suggested to continue in an inpatient setting prior to returning home. He was previously living independently at home with the assistance of family. His evening was complicated by increased confusion requiring the placement of a sitter. He remains pleasantly confused this morning. Past Medical History:  has a past medical history of Benign prostatic hypertrophy without urinary obstruction, Bradycardia, COPD (chronic obstructive pulmonary disease) ([address] Dementia ([address] Glaucoma, Gout, Hyperlipidemia, Hypertension, Hypoxemia, Macular degeneration disease, Osteoarthritis, Sleep apnea, and Unspecified sleep apnea. Past Surgical History:  has a past surgical history that includes Total hip arthroplasty (2005); joint replacement; TURP; knee surgery; and Colonoscopy.   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 Pt less agitated during session, but intermittently perseverated on asking where his keys/ car was and if he was going home. Daughter provided ongoing education/ cueing and redirection to pt. Pt's daughter was present for majority of session. Pain: Denies   Safety Interventions: patient left in chair, chair alarm in place, call light within reach, patient at risk for falls, telesitter in use, and sitter present.       Therapeutic Interventions:     Session 1:   Cognition: Severity: Moderate  and Severe     Fx Problem Solving   Identifying Fall Risks: Living room  - pt able to independently identify 3/6 (50%) of unsafe scenarios in picture, increasing to 6/6 given mod cues   - pt fixated/ perseverated on one fall risk, needed mod cues to move on to identify other fall risks (slightly improved when items were crossed out by pt as instructed by SLP -- see below)   - mod cues needed for the reason why some items were unsafe and where to move things to in room   - pt crossed off risks as he completed them to aid with memory on what was already completed     Identifying Fall Risks: Bathroom  - pt able to independently identify 2 fall risks in bathroom, increasing to 3 given min cue   - given answer key, pt able to identify things such as shower chair, mats, and grab bar but unable to state why they would aid in safety  - pt stated he would never need something such as a shower chair or bathtub bench, SLP student explained these could help reduce falls especially when he is still healing from his initial fall     Fx Recall  - pt recalled daughter Bonnye Gottron) visiting over the weekend and provided with min cue from daughter able to state that his son Graciela English) visited   - pt recalled name of his previous dog   - Pt perseverated on asking where his car/ keys were x 2 throughout session, asking if he would be going home when leaving here, pt asking when he was leaving rehab (no carryover noted despite multiple repetitions throughout session)   - Pt required ongoing reminders and use of newspaper to determine the [employer] played, who they played and what the score was    Additional Interventions:       Education  Barriers To Learning: cognition  Patient Education: Provided education regarding role of SLP, results of assessment, recommendations and general speech pathology plan of care. Learning Assessment: Pt verbalized understanding   Pt requires ongoing learning     Assessment  Impairments Requiring Therapeutic Intervention: Cognitive-Linguistic Deficits   Prognosis: fair (baseline dementia)    Clinical Assessment: Pt continues to present with Moderate  and Severe  Cognitive-Linguistic Deficits . Difficulty/limitations persist with short term memory and fx carryover recalling what happened and where he is. Continue to target fx short term memory and problem solving to increase level of independence at next level of care. Pt is to be d/c'd to an independent living facility, suspect pt will need increased supports/ assistance due to reduced short term memory, poor carryover, and agitation. Plan  Frequency: 30 minutes/day; 5 days per week, as tolerated, until goals met, or discharged from ARU.   Therapeutic Interventions: Cognitive-Linguistic intervention , Compensatory Cognitive intervention , and Patient/ Family education   Discharge Recommendations: TBD  pt to dc to the [facility] [place] facility, pt's family may benefit from education re: memory units at facilities as the [facility] [place] is an independent living facility   Continued SLP at Discharge: TBD based upon progress     Goals  Time Frame: 7-12 days     Pt will recall functional information (daily tasks, hip precautions, personal hx, etc.) with >70% accuracy with set up of cognitive aids. ongoing  Patient will complete functional problem-solving tasks for daily situations with 80% accuracy, given min cues. ongoing  Patient will be instructed in memory strategies to utilize in order to promote recall of newly learned information with >70% min cues. ongoing      Therapy Session Time      Session 1   Time In 1015   Time Out 1045   Time Code Minutes 30   Individual Minutes 30     Timed Code Treatment Minutes: 30  Total Treatment Minutes: 30    Electronically Signed By:   Jennifer JACKSON CCC-SLP #[id number] 1/23/2023 1:27 PM  Speech-Language Pathologist    The speech-language pathologist was present, directed the patient's care, made skilled judgment and was responsible for assessment and treatment.     Keila Egan.,  Speech-Language Pathology

## 2023-01-23 NOTE — PROGRESS NOTES
[facility] [address]   Phone: [phone]    Physical Therapy    [] Initial Evaluation            [x] Daily Treatment Note         [] Discharge Summary      Patient: Stephanie Matute   : 1935   MRN: [id number]   Date of Service:  2023  Admitting Diagnosis: Closed fracture of left femur, sequela  Current Admission Summary: 81-year-old male with a history of BPH, COPD, HTN, HLD, and dementia who was admitted on  with left hip pain after a fall. Work-up revealed a greater trochanter fracture of the left femur. Ortho evaluated and suggested 50% weightbearing on the left lower extremity. He was evaluated by therapy and suggested to continue in an inpatient setting prior to returning home. He was previously living independently at home with the assistance of family. His evening was complicated by increased confusion requiring the placement of a sitter. He remains pleasantly confused this morning. Past Medical History:  has a past medical history of Benign prostatic hypertrophy without urinary obstruction, Bradycardia, COPD (chronic obstructive pulmonary disease) ([address] Dementia ([address] Glaucoma, Gout, Hyperlipidemia, Hypertension, Hypoxemia, Macular degeneration disease, Osteoarthritis, Sleep apnea, and Unspecified sleep apnea. Past Surgical History:  has a past surgical history that includes Total hip arthroplasty (2005); joint replacement; TURP; knee surgery; and Colonoscopy.   Discharge Recommendations: Home with Home Health Physical Therapy, 24 hr supervision secondary to cognitive deficits  DME Required For Discharge: rolling walker  Precautions/Restrictions: high fall risk, weight bearing, ROM restrictions  Weight Bearing Restrictions: partial weight bearing - 50 %  [] Right Upper Extremity  [] Left Upper Extremity [] Right Lower Extremity  [x] Left Lower Extremity     Required Braces/Orthotics: no braces required   [] Right  [] Left  Positional Restrictions:No Active Hip ABduction    Pre-Admission Information    ** Pre-admission information gained from family members in acute care setting **  Lives With: alone                     Type of Home: house  Home Layout: two level, basement with full flight  Home Access:  1 step to enter with handrail. Handrails are located on R side. Bathroom Layout: tub/shower unit, walk in shower--walk-in shower in basement; tub shower on main level. Pt typically uses walk-in shower downstairs. Bathroom Equipment:  no prior equipment  Toilet Height: standard height  Home Equipment: no prior equipment  Transfer Assistance: Independent without use of device  Ambulation Assistance:Independent without use of device  ADL Assistance: independent with all ADL's  IADL Assistance: independent with homemaking tasks--son fills medications; pt able to take them independently every morning. Active :        [x] Yes                 [] No  Hand Dominance: [] Left                 [x] Right  Current Employment: retired. Occupation: real estate  Hobbies: golf with son every weekend  Recent Falls: no recent falls. Examination   Vision:   Vision Corrective Device: wears glasses for reading  Hearing:   [employer]      Subjective  General: Patient seated in recliner upon entry, agreeable to therapy. Pt remains disoriented to location and situation with no recall of injury, and unable retain situation within session despite frequent education and orientation. Pain: Patient does not rate upon questioning  Pain Interventions: Not applicable       Functional Mobility  Bed Mobility  Bed mobility not completed on this date. Comments:   Transfers  Sit to stand transfer: supervision  Stand to sit transfer: supervision  Stand step transfer: supervision  Comments: All performed with use of RW. Continues to utilize hinge mechanics during transition to stand secondary to pain but improving this date and no loss of balance. Ambulation  Surface:level surface  Assistive Device: rolling walker  Assistance: supervision  Distance: 65' + 340' + multiple short bouts throughout session  Gait Mechanics: Step-through pattern with mild forward flexed posture, increased UE reliance, and normal step length  Comments:    Stair Mobility  Stair mobility not completed on this date. Comments:  Wheelchair Mobility:   No w/c mobility completed on this date. Comments:  Balance  Static Sitting Balance: good: independent with functional balance in unsupported position  Dynamic Sitting Balance: good: independent with functional balance in unsupported position  Static Standing Balance: fair (+): maintains balance at SBA/supervision without use of UE support  Dynamic Standing Balance: fair: maintains balance at CGA without use of UE support  Comments:    Other Therapeutic Interventions   First Session:   See functional mobility above. In addition patient completes chair push/pull with normal BRYANT and modified tandem ((L) LE forward) 1 x 10 ea at SBA. Dynamic standing activities without AD at CGA to min A: hitting golf balls on putting green, 6 in step taps (L) LE 2 x 10, ball toss with (L) LE on 6 in step (occasional (L) UE support on RW). Patient had increased difficulty performing (L) LE step taps requiring occasional min A for stability. However, no LOB occurred. Mini squats performed with (B) UE support on RW 2 x 10. Family/caregiver education on patients ongoing significant cognitive deficits observed during therapy session including inability to recall injury, inability to recall WB restrictions, and complete disorientation to situation/location requiring consistent education within sessions. Family education on recommendation for 24 hr supervision upon discharge, with discussion regarding concerns with level of independence required for safe d/c to Independent living facility.   Daughter states plan remains for d/c to [place] facility, educated on recommendation for increased supervision initially to be provided by family until patient becomes familiar with living environment. In addition education provided to patient's daughter regarding need for continued use of RW upon d/c for maintenance of WB precautions. Second Session:     Patient seated in recliner upon entry, agreeable to therapy. All transfers performed this session at supervision with use of RW. In addition, patient ambulated 215' + [de-identified]' with RW at supervision to and from the therapy gym. Stair navigation (4 in steps x 8) performed with use of (B) HR at min A and max VC to use UE for maintenance of WB precautions. Standing blaze pod targeted (L) LE taps on steps with use of (B) HR at SBA: formula reactions - 6 pods with one lighted target x 2 min. Brief standing rest break taken about 1 min 10 seconds. Swiss ball tap to ground + overhead lift on airex at CGA to min A. Patient had difficulty achieving adequate knee flexion to reach the ground, so 6 in box utilized for tapping to improve body mechanics. Patient returned to recliner with chair alarm in place, call light within reach, and daughter present. Cognition  Overall Cognitive Status: Impaired  Arousal/Alterness: appropriate responses to stimuli  Following Commands: follows one step commands consistently  Attention Span: attends with cues to redirect, difficulty dividing attention  Memory: decreased recall of precautions, decreased short term memory, decreased long term memory  Safety Judgement: decreased awareness of need for assistance, decreased awareness of need for safety  Problem Solving: assistance required to generate solutions, assistance required to implement solutions, decreased awareness of errors  Insights: decreased awareness of deficits  Initiation: requires cues for some  Sequencing: requires cues for some  Comments: Frequent VC for reorientation to surroundings.   Patient carryover within conversation/directions less than 5 minute before requiring repetition including recall of hip fracture. Command Following:   impaired    Education  Barriers To Learning: cognition  Patient Education: patient educated on goals, PT role and benefits, plan of care, precautions, weight-bearing education, general safety, functional mobility training, proper use of assistive device/equipment, orientation, family education, transfer training  Learning Assessment:  patient will require reinforcement due to cognitive deficits    Assessment  Activity Tolerance: Limited by pain and cognitive deficits with no carryover of education/situation. Impairments Requiring Therapeutic Intervention: decreased functional mobility, decreased ADL status, decreased ROM, decreased strength, decreased safety awareness, decreased cognition, decreased endurance, decreased balance, increased pain, decreased posture  Prognosis: fair  Clinical Assessment: Patient functional mobility continues improving as evidenced by ambulating further distances this date. However, sessions continue to be limited by cognitive deficits requiring frequent reorientation. Patient's dynamic standing balance continues to improve as evidenced by ability to performing standing activities without AD and no LOB. Patient's daughter educated this date about safety with d/c to new home environment (see above). Patient would continue to benefit from skilled therapy services in order to promote functional independence and improved mobility.    Safety Interventions: patient left in chair, chair alarm in place, call light within reach, gait belt, telesitter in use, and sitter present    Plan  Frequency: 5 x/week, 75 min/day  Current Treatment Recommendations: strengthening, ROM, balance training, functional mobility training, transfer training, gait training, stair training, endurance training, neuromuscular re-education, modalities, patient/caregiver education, ADL/self-care training, cognitive reorientation, pain management, and safety education    Goals  Patient Goals: To return home   Short Term Goals:  Time Frame: 2 weeks  Patient will complete bed mobility at modified independent   Patient will complete transfers at [facility]   Patient will ambulate 150 ft with use of rolling walker at modified independent  Patient will ascend/descend 4 stairs with (R) ascending handrail at modified independent  Patient will complete car transfer at [facility]    Therapy Session Time  First Session Therapy Time:   Individual Concurrent Group Co-treatment   Time In  0831         Time Out  0937         Minutes  77           Second Session Therapy Time:   Individual Concurrent Group Co-treatment   Time In  1115         Time Out  1145         Minutes  30           Timed Code Treatment Minutes:  66 + 30 minutes    Total Treatment Minutes:   96 minutes      Electronically Signed By:     Dylan Foley, [facility]  Therapist observed and directed the patient's plan of care.   Co-signed and supervised by: Jeanna Lau PT, DPT - [id number]

## 2023-01-23 NOTE — PROGRESS NOTES
Stephanie Matute  1/23/2023  [id number]    Chief Complaint: Closed fracture of left femur, sequela    Subjective:   Still agitated at night. Sitter remains. Vitals and labs appropriate. Family concerned narcotics are adding to his confusion. ROS: No CP, SOB, dyspnea    Objective:  Patient Vitals for the past 24 hrs:   BP Temp Temp src Pulse Resp SpO2 Height   01/23/23 0804 -- -- -- -- -- -- 5' 11\" (1.803 m)   01/22/23 2202 (!) 154/77 97.2 °F (36.2 °C) Oral 65 16 94 % --   01/22/23 0912 -- -- -- -- 16 94 % --       Gen: No distress, pleasant. Resting in bedside chair  HEENT: Normocephalic, atraumatic. CV: Regular rate and rhythm. No MRG   Resp: No respiratory distress. CTAB   Abd: Soft, nontender, nondistended  Ext: No edema. Neuro: Alert, oriented, appropriately interactive. Higher level cognitive deficits present    Laboratory data: Available via EMR. Therapy progress:    PT    Supine to Sit: Supervision or touching assistance  Sit to Supine: Supervision or touching assistance   Sit to Stand: Supervision or touching assistance  Chair/Bed to Chair Transfer: Supervision or touching assistance  Car Transfer: Supervision or touching assistance  Ambulation 10 ft: Supervision or touching assistance  Ambulation 50 ft: Supervision or touching assistance  Ambulation 150 ft: Supervision or touching assistance  Stairs - 1 Step: Partial/moderate assistance  Stairs - 4 Step: Partial/moderate assistance  Stairs - 12 Step:      OT    Eating: Setup or clean-up assistance  Oral Hygiene: Setup or clean-up assistance  Bathing: Supervision or touching assistance  Upper Body Dressing: Setup or clean-up assistance  Lower Body Dressing: Partial/moderate assistance  Toilet Transfer: Supervision or touching assistance  Toilet Hygiene: Supervision or touching assistance    Speech Therapy    Pt continues to present with Moderate  and Severe  Cognitive-Linguistic Deficits .  Difficulty/limitations persist with short term memory and fx carryover recalling what happened and where he is. Pt with intermittent agitation and willingness to participate. This date pt receptive to completing the Brief Cognitive Rating Scale (BCRS) interview questions (see above) with pt falling within the moderate cognitive decline (mild dementia) range on the Global Deterioration Scale or Assessment of Primary Degenerative Dementia (see above). If pt continues to show increased agitation and does not wish to participate will d/c from speech therapy caseload. Continue to target fx short term memory and problem solving to facilitate safe return to prior level of independence. Body mass index is 28.94 kg/m². Assessment:  Patient Active Problem List   Diagnosis    Benign prostatic hyperplasia without urinary obstruction    Mixed hyperlipidemia    Bradycardia    Primary hypertension    Pre-diabetes    Primary osteoarthritis of right knee    Memory impairment    Nipple soreness    Skin lesion on examination    Late onset Alzheimer's dementia without behavioral disturbance (HCC)    Vitamin D insufficiency    Nondisplaced fracture of greater trochanter of left femur, initial encounter for closed fracture (HCC)    Unable to ambulate    Alzheimer's dementia ([facility] [address]    Imbalance    Ataxia    Closed fracture of left femur, sequela       Plan:   Left greater trochanter femur fracture: 50% WBAT in LLE. PT/OT. Pain control. - ortho 2 week FU tomorrow     Dementia: resumed namenda and aricept per home regimen. Seroquel 25 as needed     HTN: resumed home lisinopril 10      Bowels: Per protocol  Bladder: Per protocol   Sleep: Trazodone provided prn. Pain: tylenol scheduled, kelly PRN 5mg -> tramadol PRN  DVT PPx: lovenox - d/c, ambulating >250'  ARSENIO: 1/26    Paul Marquez MD 1/23/2023, 8:30 AM    * This document was created using dictation software. While all precautions were taken to ensure accuracy, errors may have occurred.   Please disregard any typographical errors.

## 2023-01-24 ENCOUNTER — APPOINTMENT (OUTPATIENT)
Dept: GENERAL RADIOLOGY | Age: 88
DRG: 536 | End: 2023-01-24
Attending: PHYSICAL MEDICINE & REHABILITATION
Payer: MEDICARE

## 2023-01-24 ENCOUNTER — TELEPHONE (OUTPATIENT)
Dept: INTERNAL MEDICINE CLINIC | Age: 88
End: 2023-01-24

## 2023-01-24 PROCEDURE — 97116 GAIT TRAINING THERAPY: CPT

## 2023-01-24 PROCEDURE — 6370000000 HC RX 637 (ALT 250 FOR IP): Performed by: PHYSICAL MEDICINE & REHABILITATION

## 2023-01-24 PROCEDURE — 97535 SELF CARE MNGMENT TRAINING: CPT

## 2023-01-24 PROCEDURE — 97530 THERAPEUTIC ACTIVITIES: CPT

## 2023-01-24 PROCEDURE — 73502 X-RAY EXAM HIP UNI 2-3 VIEWS: CPT

## 2023-01-24 PROCEDURE — 1280000000 HC REHAB R&B

## 2023-01-24 PROCEDURE — 6360000002 HC RX W HCPCS: Performed by: PHYSICAL MEDICINE & REHABILITATION

## 2023-01-24 PROCEDURE — 97130 THER IVNTJ EA ADDL 15 MIN: CPT

## 2023-01-24 PROCEDURE — 97129 THER IVNTJ 1ST 15 MIN: CPT

## 2023-01-24 PROCEDURE — 97110 THERAPEUTIC EXERCISES: CPT

## 2023-01-24 RX ADMIN — MEMANTINE 5 MG: 5 TABLET ORAL at 11:28

## 2023-01-24 RX ADMIN — STANDARDIZED SENNA CONCENTRATE AND DOCUSATE SODIUM 2 TABLET: 8.6; 5 TABLET ORAL at 11:27

## 2023-01-24 RX ADMIN — LISINOPRIL 10 MG: 10 TABLET ORAL at 11:28

## 2023-01-24 RX ADMIN — DONEPEZIL HYDROCHLORIDE 10 MG: 5 TABLET, FILM COATED ORAL at 19:25

## 2023-01-24 RX ADMIN — MEMANTINE 5 MG: 5 TABLET ORAL at 19:25

## 2023-01-24 RX ADMIN — QUETIAPINE FUMARATE 25 MG: 25 TABLET ORAL at 19:25

## 2023-01-24 RX ADMIN — ACETAMINOPHEN 1000 MG: 500 TABLET ORAL at 11:27

## 2023-01-24 RX ADMIN — DORZOLAMIDE HYDROCHLORIDE 1 DROP: 20 SOLUTION/ DROPS OPHTHALMIC at 19:26

## 2023-01-24 RX ADMIN — ACETAMINOPHEN 1000 MG: 500 TABLET ORAL at 19:24

## 2023-01-24 RX ADMIN — DORZOLAMIDE HYDROCHLORIDE 1 DROP: 20 SOLUTION/ DROPS OPHTHALMIC at 11:30

## 2023-01-24 RX ADMIN — QUETIAPINE FUMARATE 25 MG: 25 TABLET ORAL at 11:28

## 2023-01-24 RX ADMIN — DIPHENHYDRAMINE HYDROCHLORIDE 25 MG: 25 TABLET ORAL at 21:36

## 2023-01-24 RX ADMIN — STANDARDIZED SENNA CONCENTRATE AND DOCUSATE SODIUM 2 TABLET: 8.6; 5 TABLET ORAL at 19:25

## 2023-01-24 RX ADMIN — ENOXAPARIN SODIUM 40 MG: 100 INJECTION SUBCUTANEOUS at 19:24

## 2023-01-24 ASSESSMENT — PAIN SCALES - GENERAL
PAINLEVEL_OUTOF10: 0
PAINLEVEL_OUTOF10: 4

## 2023-01-24 ASSESSMENT — PAIN DESCRIPTION - ORIENTATION: ORIENTATION: LEFT

## 2023-01-24 ASSESSMENT — PAIN DESCRIPTION - DESCRIPTORS: DESCRIPTORS: ACHING

## 2023-01-24 ASSESSMENT — PAIN DESCRIPTION - LOCATION: LOCATION: HIP

## 2023-01-24 NOTE — PLAN OF CARE
Problem: Discharge Planning  Goal: Discharge to home or other facility with appropriate resources  1/24/2023 1200 by Ike Lucia RN  Outcome: Progressing     Problem: Pain  Goal: Verbalizes/displays adequate comfort level or baseline comfort level  1/24/2023 1200 by Ike Lucia RN  Outcome: Progressing     Problem: Skin/Tissue Integrity  Goal: Absence of new skin breakdown  Description: 1. Monitor for areas of redness and/or skin breakdown  2. Assess vascular access sites hourly  3. Every 4-6 hours minimum:  Change oxygen saturation probe site  4. Every 4-6 hours:  If on nasal continuous positive airway pressure, respiratory therapy assess nares and determine need for appliance change or resting period.   1/24/2023 1200 by Ike Lucia RN  Outcome: Progressing     Problem: Safety - Adult  Goal: Free from fall injury  1/24/2023 1200 by Ike Lucia RN  Outcome: Progressing     Problem: ABCDS Injury Assessment  Goal: Absence of physical injury  1/24/2023 1200 by Ike Lucia RN  Outcome: Progressing

## 2023-01-24 NOTE — TELEPHONE ENCOUNTER
Leopoldo Meigs from [facility] calling need verbal orders for nursing--OT and PT  ---please call her at [phone].   thanks

## 2023-01-24 NOTE — PROGRESS NOTES
[facility] [address]   Phone: [phone]    Occupational Therapy    [] Initial Evaluation            [x] Daily Treatment Note         [] Discharge Summary      Patient: Clarissa Doan   : 1935   MRN: [id number]   Date of Service:  2023    Admitting Diagnosis:  Closed fracture of left femur, sequela  Current Admission Summary: 64-year-old male with a history of BPH, COPD, HTN, HLD, and dementia who was admitted on  with left hip pain after a fall. Work-up revealed a greater trochanter fracture of the left femur. Ortho evaluated and suggested 50% weightbearing on the left lower extremity. He was evaluated by therapy and suggested to continue in an inpatient setting prior to returning home. He was previously living independently at home with the assistance of family. His evening was complicated by increased confusion requiring the placement of a sitter. He remains pleasantly confused this morning. Past Medical History:  has a past medical history of Benign prostatic hypertrophy without urinary obstruction, Bradycardia, COPD (chronic obstructive pulmonary disease) ([address] Dementia ([address] Glaucoma, Gout, Hyperlipidemia, Hypertension, Hypoxemia, Macular degeneration disease, Osteoarthritis, Sleep apnea, and Unspecified sleep apnea. Past Surgical History:  has a past surgical history that includes Total hip arthroplasty (2005); joint replacement; TURP; knee surgery; and Colonoscopy.     Discharge Recommendations: HHOT S3- 24 hour supervision and assist     DME Required For Discharge: DME to be determined pending patient progress, rolling walker, shower chair with back    Precautions/Restrictions: high fall risk, weight bearing, ROM restrictions  Weight Bearing Restrictions: partial weight bearing - 50 %  [] Right Upper Extremity  [] Left Upper Extremity [] Right Lower Extremity  [x] Left Lower Extremity     Required Braces/Orthotics: no braces required   [] Right  [] Left  Positional Restrictions: no active hip abduction     Pre-Admission Information   Lives With: alone                     Type of Home: house  Home Layout: two level, basement with full flight  Home Access:  1 step to enter with handrail. Handrails are located on R side. Bathroom Layout: tub/shower unit, walk in shower--walk-in shower in basement; tub shower on main level. Pt typically uses walk-in shower downstairs. Bathroom Equipment:  no prior equipment  Toilet Height: standard height  Home Equipment: no prior equipment  Transfer Assistance: Independent without use of device  Ambulation Assistance:Independent without use of device  ADL Assistance: independent with all ADL's  IADL Assistance: independent with homemaking tasks--son fills medications; pt able to take them independently every morning. Active :        [x] Yes                 [] No  Hand Dominance: [] Left                 [x] Right  Current Employment: retired. Occupation: real estate  Hobbies: golf with son every weekend  Recent Falls: no recent falls. Note: Social functional information acquired from acute evaluation on 1/12. Pt has baseline dementia. Daughter states, Shena Loza is functional but forgetful\". She indicates his confusion has been exacerbated since his admission due to unfamiliar setting and events of hospital course. Subjective  General: Pt seated in recliner chair very emotional and stating \"get me out of here, I'm going to die here, I want to leave now\" upon arrival, able to be redirected and pt educated on d/c date and agreeable to OT treatment session.    Pain: Patient does not rate upon questioning patient reporting left leg is sore but does not rate  Pain Interventions: patient denies pain interventions      Activities of Daily Living    Basic Activities of Daily Living  Grooming: stand by assistance  Grooming Comments: oral care and hair combing and hand hygiene in stance at sink  Upper Extremity Dressing: setup assistance supervision  Lower Extremity Dressing: setup assistance stand by assistance contact guard assistance  Dressing Comments: attempted to don pants while standing pt educated on completing while seated to increase safety. Pt verbalized understanding but will benefit from reinforcement   Toileting: stand by assistance. Toileting Equipment: none  Toileting Comments: completed in stance at commode  General Comments: Pt completed UB/LB dressing at recliner in both seated/standing, pt ambulated to commode to complete toileting, commode>sink for grooming tasks. Instrumental Activities of Daily Living  No IADL completed on this date. Functional Mobility    Bed Mobility  Bed mobility not completed on this date. Comments: pt in recliner chair at [facility] to stand transfer:stand by assistance  Stand to sit transfer: stand by assistance  Toilet transfer comments: pt completed toileting in stance  Comments: pt completed x5 transfers at a variety  of different surface heights with SBA to increase ease with household transfers. Pt required cueing to bring RW fully around before sitting with little carryover noted. Functional Mobility:  Sitting Balance: supervision. Sitting Balance Comment: seated in recliner chair  Standing Balance: stand by assistance, contact guard assistance. Standing Balance Comment: 1 instance of CGA while pt attempting to put on pants while in stance,however, after education pt completed while seated  Functional Mobility: .  stand by assistance  Functional Mobility Activity: to/from bathroom, to/from therapy room  Functional Mobility Device Use: rolling walker  Functional Mobility Comment:      Therapeutic Activity  1)Pt completed standing table top task (pipe building) w/ use of visual aid to increase standing tolerance and for cognitive challenge demo ability to accurately complete 1/1 towers.  Pt stood for 10 minutes prior to needing rest break. 2) pt completed item retrieval task with use of reacher to grasp x5 bean bags from floor and place of RW tray to transport items. Second Session  Pt seated in recliner chair upon arrival, slightly agitated initially, and sitter in room but agreeable to OT treatment session. Pt completed sit>stand transfer from recliner followed by functional mobility to bathroom where pt voided urine while in stance with SBA. Pt ambulated to sink with SBA and use of RW and completed hand hygiene and combed hair. Pt then ambulated to therapy gym with use of RW and SBA. While in therapy gym pt completed standing table top task (word search) demo ability for pt to stand for 10 min x2 with seated rest break in between. Pt requires often redirection to task at hand but able to successfully complete word search by EOS. Pt ambulated back to room with use of RW and SBA. Pt left in chair, call light near, chair alarm on, and all needs met.      Cognition  Overall Cognitive Status: Impaired  Arousal/Alterness: appropriate responses to stimuli  Following Commands: follows one step commands consistently  Attention Span: difficulty dividing attention  Memory: decreased recall of precautions, decreased recall of recent events, decreased short term memory  Safety Judgement: decreased awareness of need for assistance, decreased awareness of need for safety  Problem Solving: assistance required to generate solutions, assistance required to implement solutions, decreased awareness of errors  Insights: decreased awareness of deficits  Initiation: requires cues for some  Sequencing: requires cues for some  Comments: baseline dementia but increased confusion during hospital stay   Orientation:    oriented to person, disoriented to place, disoriented to time , and disoriented to situation  Command Following:   accurately follows one step commands  Education  Barriers To Learning: cognition  Patient Education: patient educated on goals, OT role and benefits, plan of care, precautions, ADL adaptive strategies, weight-bearing education, orientation, disease specific education, transfer training, discharge recommendations  Learning Assessment:  patient will require reinforcement due to cognitive deficits  Assessment  Activity Tolerance: fair-limited by cognition and pain, however improving compared to previous sessions  Impairments Requiring Therapeutic Intervention: decreased functional mobility, decreased ADL status, decreased strength, decreased safety awareness, decreased cognition, decreased endurance, decreased balance, decreased IADL, increased pain, decreased posture  Prognosis: good  Clinical Assessment: Pt is currently functioning below occupational baseline and demo the deficits listed above.    Safety Interventions: patient left in chair, chair alarm in place, call light within reach, patient at risk for falls, and sitter present    Plan  Frequency: 5 x/week, 75 min/day  Current Treatment Recommendations: strengthening, balance training, functional mobility training, transfer training, endurance training, patient/caregiver education, ADL/self-care training, IADL training, cognitive reorientation, safety education, and equipment evaluation/education  Goals  Patient Goals: to return to PLOF   Short Term Goals:  Time Frame: 2 weeks   Patient will complete upper body ADL at modified independent   Patient will complete lower body ADL at modified independent   Patient will complete toileting at modified independent   Patient will complete functional transfers at modified independent   Patient to gather and transport IADL items at modified independent     -Goals not yet met this date      Therapy Session Time    First Session Therapy Time:   Individual Group Co-treatment   Time In 0845      Time Out 0945      Minutes 61        Second Session Therapy Time:   Individual Concurrent Group Co-treatment   Time In 1400        Time Out 1442        Minutes 42          Timed Code Treatment Minutes:  60 + 42    Total Treatment Minutes: 102 minutes total    Jane Campbell, DOUGR/L [id number]

## 2023-01-24 NOTE — PROGRESS NOTES
[facility] Orthopedic Surgery  Plan of care Note    LEFT greater trochanteric periprosthetic fx from fall on 1/11/23. On scheduled tylenol and tramadol prn. Objective:  Vitals:    01/24/23 0830   BP: (!) 155/70   Pulse: 55   Resp: 18   Temp: 97.5 °F (36.4 °C)   SpO2: 96%        IMPRESSION:  LEFT minimally displaced greater trochanter fracture with stable total hip arthroplasty (DOI:1/11/23)  Dementia  Principal Problem:    Closed fracture of left femur, sequela  Resolved Problems:    * No resolved hospital problems. *      PLAN:  Will follow-up with patient tomorrow after hip films completed.    - Currently 50% WB LLE with walker; NO active left hip ABDuction  - Ice therapy  - Pain control  - DVT prophylaxis: per primary team  - PT/OT  - Dispo: ADRIANA Cano - CNP  1/24/2023  8:47 AM

## 2023-01-24 NOTE — CARE COORDINATION
Discharge Planning:     (CESAR) There was no team conference held in Patient's room today as he was already upset and team did not want to agitate him more. The daughter had left to go to work already.      Electronically signed by SONDRA Claros on 1/24/2023 at 11:47 AM

## 2023-01-24 NOTE — PROGRESS NOTES
[facility] [address] [facility]   Phone: [phone]    Speech Therapy    [] Initial Evaluation            [x] Daily Treatment Note         [] Discharge Summary      Patient: Ramiro Harkins   : 1935   MRN: [id number]   Date of Service:  2023  Admitting Diagnosis: Closed fracture of left femur, sequela  Current Admission Summary: 81-year-old male with a history of BPH, COPD, HTN, HLD, and dementia who was admitted on  with left hip pain after a fall. Work-up revealed a greater trochanter fracture of the left femur. Ortho evaluated and suggested 50% weightbearing on the left lower extremity. He was evaluated by therapy and suggested to continue in an inpatient setting prior to returning home. He was previously living independently at home with the assistance of family. His evening was complicated by increased confusion requiring the placement of a sitter. He remains pleasantly confused this morning. Past Medical History:  has a past medical history of Benign prostatic hypertrophy without urinary obstruction, Bradycardia, COPD (chronic obstructive pulmonary disease) ([address] Dementia ([address] Glaucoma, Gout, Hyperlipidemia, Hypertension, Hypoxemia, Macular degeneration disease, Osteoarthritis, Sleep apnea, and Unspecified sleep apnea. Past Surgical History:  has a past surgical history that includes Total hip arthroplasty (2005); joint replacement; TURP; knee surgery; and Colonoscopy.   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 Daughter initially present for session but had to leave. Pt with ongoing perseverative questions re: where he was going, when he was leaving and asking where his car/ keys were. Pt with increased agitation and tearfulness due to current situation and poor carryover despite ongoing education, repetition and emotional support. Pain: Denies   Safety Interventions: patient left in chair, chair alarm in place, call light within reach, patient at risk for falls, telesitter in use, and sitter present. Therapeutic Interventions:     Session 1:   Cognition: Severity: Moderate  and Severe   Personal Recall  - Pt able to recall / age Independently, stating he will have an upcoming birthday and turn 81     Fx Recall  - pt oriented to daughter Bassam Chapin in room but became agitated questioning what d/c plans were and where he was going to go after leaving here   - pt with perseverative questions throughout session asking where his wallet and car were, asking how his house was and where he would be going and who would be taking care of him when he left here. Pt tearful when talking about not returning home as he was able to refer back to calendar throughout session with d/c destination written by family then crossed out.  Ongoing support and redirection provided throughout.   - Pt required ongoing reminders and use of visual aid to indicate when he would be leaving rehab   - Attempted to utilize distraction technique to move away from perseveration as a form of redirection with minimal success     Spaced Retrieval Technique   - utilized to increase recall of call button for improved safety in current environment   - Pt able to recall up to a 4 min delay (given 4/4 opportunities) throughout session and at the end of session with call button located on tray table within pt's view     Fx Problem Solving: Reading Comprehension/ Answering questions about a menu   - 4/4 (100%) acc Independently (adequate processing time and visual scanning) -- unable to complete secondary to pt continually being perseverative on questions re: belongings    Additional Interventions: Provided additional resources including educational handouts/ material to patient's daughter re: use of memory notebooks and signage for next level of care. Education  Barriers To Learning: cognition  Patient Education: Provided education regarding role of SLP, results of assessment, recommendations and general speech pathology plan of care. Learning Assessment: Pt verbalized understanding   Pt requires ongoing learning     Assessment  Impairments Requiring Therapeutic Intervention: Cognitive-Linguistic Deficits   Prognosis: fair (baseline dementia)    Clinical Assessment: Pt continues to present with Moderate  and Severe  Cognitive-Linguistic Deficits . Difficulty/limitations persist with short term memory and fx carryover recalling what happened and where he is. Continue to target fx short term memory and problem solving to increase level of independence at next level of care. Pt is to be d/c'd to an independent living facility, suspect pt will need increased supports/ assistance due to reduced short term memory, poor carryover, and agitation. Plan  Frequency: 30 minutes/day; 5 days per week, as tolerated, until goals met, or discharged from ARU.   Therapeutic Interventions: Cognitive-Linguistic intervention , Compensatory Cognitive intervention , and Patient/ Family education   Discharge Recommendations: TBD  pt to dc to the [facility] facility, pt's family may benefit from education re: memory units at facilities as the [facility] is an independent living facility   Continued SLP at Discharge: TBD based upon progress     Goals  Time Frame: 7-12 days     Pt will recall functional information (daily tasks, hip precautions, personal hx, etc.) with >70% accuracy with set up of cognitive aids. Limited progress due to reduced carryover   Patient will complete functional problem-solving tasks for daily situations with 80% accuracy, given min cues. Limited progress   Patient will be instructed in memory strategies to utilize in order to promote recall of newly learned information with >70% min cues. Limited progress due to reduced carryover       Therapy Session Time      Session 1   Time In 1000   Time Out 1030   Time Code Minutes 30   Individual Minutes 30     Timed Code Treatment Minutes: 30  Total Treatment Minutes: 30    Electronically Signed By:   Gianni JACKSON [facility]-SLP #[id number] 1/24/2023 9:22 AM  Speech-Language Pathologist    The speech-language pathologist was present, directed the patient's care, made skilled judgment and was responsible for assessment and treatment.     Deni Barajas.,  Speech-Language Pathology

## 2023-01-24 NOTE — PROGRESS NOTES
The pt is upset and tearful. The pt repeatedly saying \"why am I here? I don't want to be here? I don't want to drink or eat. \"  The pt refused to drink water and juice and food when offered. Difficult to orient the pt. The pt did remember any answers. The pt refused to take medications at this time. The pt did not want a sitter in the room. The sitter is at a nursing station monitoring the pt via video camera.

## 2023-01-24 NOTE — PROGRESS NOTES
[facility] [address]   Phone: [phone]    Physical Therapy    [] Initial Evaluation            [x] Daily Treatment Note         [] Discharge Summary      Patient: Stefano Shannon   : 1935   MRN: [id number]   Date of Service:  2023  Admitting Diagnosis: Closed fracture of left femur, sequela  Current Admission Summary: 81-year-old male with a history of BPH, COPD, HTN, HLD, and dementia who was admitted on  with left hip pain after a fall. Work-up revealed a greater trochanter fracture of the left femur. Ortho evaluated and suggested 50% weightbearing on the left lower extremity. He was evaluated by therapy and suggested to continue in an inpatient setting prior to returning home. He was previously living independently at home with the assistance of family. His evening was complicated by increased confusion requiring the placement of a sitter. He remains pleasantly confused this morning. Past Medical History:  has a past medical history of Benign prostatic hypertrophy without urinary obstruction, Bradycardia, COPD (chronic obstructive pulmonary disease) ([address] Dementia ([address] Glaucoma, Gout, Hyperlipidemia, Hypertension, Hypoxemia, Macular degeneration disease, Osteoarthritis, Sleep apnea, and Unspecified sleep apnea. Past Surgical History:  has a past surgical history that includes Total hip arthroplasty (2005); joint replacement; TURP; knee surgery; and Colonoscopy.   Discharge Recommendations: Home with Home Health Physical Therapy, 24 hr supervision secondary to cognitive deficits  DME Required For Discharge: rolling walker  Precautions/Restrictions: high fall risk, weight bearing, ROM restrictions  Weight Bearing Restrictions: partial weight bearing - 50 %  [] Right Upper Extremity  [] Left Upper Extremity [] Right Lower Extremity  [x] Left Lower Extremity     Required Braces/Orthotics: no braces required   [] Right  [] Left  Positional Restrictions:No Active Hip ABduction    Pre-Admission Information    ** Pre-admission information gained from family members in acute care setting **  Lives With: alone                     Type of Home: house  Home Layout: two level, basement with full flight  Home Access:  1 step to enter with handrail. Handrails are located on R side. Bathroom Layout: tub/shower unit, walk in shower--walk-in shower in basement; tub shower on main level. Pt typically uses walk-in shower downstairs. Bathroom Equipment:  no prior equipment  Toilet Height: standard height  Home Equipment: no prior equipment  Transfer Assistance: Independent without use of device  Ambulation Assistance:Independent without use of device  ADL Assistance: independent with all ADL's  IADL Assistance: independent with homemaking tasks--son fills medications; pt able to take them independently every morning. Active :        [x] Yes                 [] No  Hand Dominance: [] Left                 [x] Right  Current Employment: retired. Occupation: real estate  Hobbies: golf with son every weekend  Recent Falls: no recent falls. Examination   Vision:   Vision Corrective Device: wears glasses for reading  Hearing:   [employer]      Subjective  General: Patient seated in recliner upon entry, agreeable to therapy. Pt remains disoriented to location and situation with occasional recall of injury, and unable retain situation within session despite frequent education and orientation. Pain: Patient does not rate upon questioning  Pain Interventions: Not applicable       Functional Mobility  Bed Mobility - completed in second session  Supine to Sit: Independent  Sit to Supine: Independent  Rolling Left: Independent  Rolling Right: Independent  Scooting: Independent  Bridging: Independent  Comments: All performed on traditional flat bed without use of handrails.   Transfers  Sit to stand transfer: modified independent  Stand to sit transfer: modified independent  Stand step transfer: modified independent  Car transfer: modified independent  Comments: All performed with use of RW. Ambulation  Surface:level surface  Assistive Device: rolling walker  Assistance: supervision  Distance: 120' + 100' + 80'  Gait Mechanics: Step-through pattern with mild forward flexed posture, increased UE reliance, and normal step length. Increased gait speed this date. Comments:  VC provided to slow down. Stair Mobility - completed in second session  Number of Steps: 3  Step Height: 6 inch  Hand Rails: (B) handrail  Assistance: contact guard assistance  Comments: VC to increase UE support to maintain 50% WB. Wheelchair Mobility:   No w/c mobility completed on this date. Comments:  Balance  Static Sitting Balance: good: independent with functional balance in unsupported position  Dynamic Sitting Balance: good: independent with functional balance in unsupported position  Static Standing Balance: fair (+): maintains balance at SBA/supervision without use of UE support  Dynamic Standing Balance: fair: maintains balance at CGA without use of UE support  Comments:    Other Therapeutic Interventions   First Session:   See functional mobility above. In addition patient completes corn hole toss 3 x 6 without UE support at CGA to min A: normal BRYANT on airex x 2, (L) LE on airex (R) LE on normal ground x 1. VC provided to promote upright posture. Standing (L) LE exercises with 1.5# ankle weight and (B) UE support: step up/over remy (1 x 10 without ankle weight, 1 x 10 with weight), hip extension 1 x 20. Second Session:     Patient seated in recliner upon entry, agreeable to therapy. Patient performed all transfers this session at modified independent with use of RW. In addition, patient ambulated 0' + 210' with use of RW at supervision. Bed mobility and stair navigation performed this session. See functional mobility above for more details.  In addition, standing on airex + blaze pod targeted reaching to mirror at ballet bars without UE support at [facility] 2 x 3 min: formula reactions - 6 pods one lit target, 6 pods all lit up various colors with one blue target. Standing UE exercises with green TB 2 x 10 ea: rows, (B) shoulder ext pull downs. STS from elevated chair + horizontal abduction with green TB 1 x 10. STS from elevated chair holding unweighted ball reaching towards target to promote standing without UE support 2 x 5. Patient returned to recliner with chair alarm in place, call light within reach, and sitter present. Cognition  Overall Cognitive Status: Impaired  Arousal/Alterness: appropriate responses to stimuli  Following Commands: follows one step commands consistently  Attention Span: attends with cues to redirect, difficulty dividing attention  Memory: decreased recall of precautions, decreased short term memory, decreased long term memory  Safety Judgement: decreased awareness of need for assistance, decreased awareness of need for safety  Problem Solving: assistance required to generate solutions, assistance required to implement solutions, decreased awareness of errors  Insights: decreased awareness of deficits  Initiation: requires cues for some  Sequencing: requires cues for some  Comments: Frequent VC for reorientation to surroundings. Patient carryover within conversation/directions less than 5 minute before requiring repetition including recall of hip fracture.   Command Following:   impaired    Education  Barriers To Learning: cognition  Patient Education: patient educated on goals, PT role and benefits, plan of care, precautions, weight-bearing education, general safety, functional mobility training, proper use of assistive device/equipment, orientation, family education, transfer training  Learning Assessment:  patient will require reinforcement due to cognitive deficits    Assessment  Activity Tolerance: Limited by cognitive deficits with no carryover of education/situation. Impairments Requiring Therapeutic Intervention: decreased functional mobility, decreased ADL status, decreased ROM, decreased strength, decreased safety awareness, decreased cognition, decreased endurance, decreased balance, increased pain, decreased posture  Prognosis: fair  Clinical Assessment: Patient achieved three goals this date as evidenced by progressing to modified independent level with bed mobility and all transfers including car. However, sessions continue to be limited by cognitive deficits requiring frequent reorientation. Patient would continue to benefit from skilled therapy services in order to promote functional independence and improved mobility. Safety Interventions: patient left in chair, chair alarm in place, call light within reach, gait belt, telesitter in use, and sitter present    Plan  Frequency: 5 x/week, 75 min/day  Current Treatment Recommendations: strengthening, ROM, balance training, functional mobility training, transfer training, gait training, stair training, endurance training, neuromuscular re-education, modalities, patient/caregiver education, ADL/self-care training, cognitive reorientation, pain management, and safety education    Goals  Patient Goals:  To return home   Short Term Goals:  Time Frame: 2 weeks  Patient will complete bed mobility at modified independent - goal met 1/24/2023  Patient will complete transfers at modified independent - goal met 1/24/2023  Patient will ambulate 150 ft with use of rolling walker at modified independent  Patient will ascend/descend 4 stairs with (R) ascending handrail at modified independent  Patient will complete car transfer at modified independent - goal met 1/24/2023    Therapy Session Time    First Session Therapy Time:   Individual Concurrent Group Co-treatment   Time In  1034         Time Out  1105         Minutes  31             Second Session Therapy Time:   Individual Concurrent Group Co-treatment   Time In  1245         Time Out  1345         Minutes  60           Timed Code Treatment Minutes:  31 + 60 minutes    Total Treatment Minutes:   91 minutes      Electronically Signed By:     Arianna Guerra, SPT  Therapist observed and directed the patient's plan of care.   Co-signed and supervised by: Elaine Spatz PT, DPT - [id number]

## 2023-01-24 NOTE — PROGRESS NOTES
The pt took all the am medication and seraqual late morning. The pt continue to ask same questions frequently and be upset about it as before.

## 2023-01-24 NOTE — PLAN OF CARE
Problem: Discharge Planning  Goal: Discharge to home or other facility with appropriate resources  1/24/2023 0134 by Brittney Judd RN  Outcome: Progressing     Problem: Pain  Goal: Verbalizes/displays adequate comfort level or baseline comfort level  1/24/2023 0134 by Brittney Judd RN  Outcome: Progressing     Problem: Skin/Tissue Integrity  Goal: Absence of new skin breakdown  Description: 1. Monitor for areas of redness and/or skin breakdown  2. Assess vascular access sites hourly  3. Every 4-6 hours minimum:  Change oxygen saturation probe site  4. Every 4-6 hours:  If on nasal continuous positive airway pressure, respiratory therapy assess nares and determine need for appliance change or resting period.   1/24/2023 0134 by Brittney Judd RN  Outcome: Progressing     Problem: Safety - Adult  Goal: Free from fall injury  1/24/2023 0134 by Brittney Judd RN  Outcome: Progressing     Problem: ABCDS Injury Assessment  Goal: Absence of physical injury  1/24/2023 0134 by Brittney Judd RN  Outcome: Progressing

## 2023-01-24 NOTE — PROGRESS NOTES
Pt. Talking very loud and would not stay seated and wanted to go home. Insisting the sitter leave. Kept asking the same questions over and over. Refused to go to bed or the bathroom. Seroquel given.

## 2023-01-24 NOTE — TELEPHONE ENCOUNTER
Left message for [place]. Pt has not been seen in almost a year.  Pt would need to be seen by Dr Debbi Swanson or someone in the practice first.

## 2023-01-24 NOTE — PROGRESS NOTES
Kimberlyn Almanza  1/24/2023  [id number]    Chief Complaint: Closed fracture of left femur, sequela    Subjective:   Still agitated at night. Sitter remains. Vitals and labs appropriate. Required multiple sedatives. Not following weight restrictions. ROS: No CP, SOB, dyspnea    Objective:  Patient Vitals for the past 24 hrs:   BP Temp Temp src Pulse Resp SpO2   01/23/23 2130 (!) 144/84 98.1 °F (36.7 °C) Oral 63 18 96 %   01/23/23 1000 129/65 97.5 °F (36.4 °C) -- 67 16 95 %       Gen: No distress, pleasant. Resting in bedside chair  HEENT: Normocephalic, atraumatic. CV: Regular rate and rhythm. No MRG   Resp: No respiratory distress. CTAB   Abd: Soft, nontender, nondistended  Ext: No edema. Neuro: Alert, oriented, appropriately interactive. Higher level cognitive deficits present    Laboratory data: Available via EMR. Therapy progress:    PT    Supine to Sit: Supervision or touching assistance  Sit to Supine: Supervision or touching assistance   Sit to Stand: Supervision or touching assistance  Chair/Bed to Chair Transfer: Supervision or touching assistance  Car Transfer: Supervision or touching assistance  Ambulation 10 ft: Supervision or touching assistance  Ambulation 50 ft: Supervision or touching assistance  Ambulation 150 ft: Supervision or touching assistance  Stairs - 1 Step: Partial/moderate assistance  Stairs - 4 Step: Partial/moderate assistance  Stairs - 12 Step:      OT    Eating: Setup or clean-up assistance  Oral Hygiene: Supervision or touching assistance  Bathing: Supervision or touching assistance  Upper Body Dressing: Setup or clean-up assistance  Lower Body Dressing: Partial/moderate assistance  Toilet Transfer: Supervision or touching assistance  Toilet Hygiene: Supervision or touching assistance    Speech Therapy    Pt continues to present with Moderate  and Severe  Cognitive-Linguistic Deficits .  Difficulty/limitations persist with short term memory and fx carryover recalling what happened and where he is. Continue to target fx short term memory and problem solving to increase level of independence at next level of care. Pt is to be d/c'd to an independent living facility, suspect pt will need increased supports/ assistance due to reduced short term memory, poor carryover, and agitation. Body mass index is 28.94 kg/m². Assessment:  Patient Active Problem List   Diagnosis    Benign prostatic hyperplasia without urinary obstruction    Mixed hyperlipidemia    Bradycardia    Primary hypertension    Pre-diabetes    Primary osteoarthritis of right knee    Memory impairment    Nipple soreness    Skin lesion on examination    Late onset Alzheimer's dementia without behavioral disturbance (HCC)    Vitamin D insufficiency    Nondisplaced fracture of greater trochanter of left femur, initial encounter for closed fracture (HCC)    Unable to ambulate    Alzheimer's dementia ([facility] [address]    Imbalance    Ataxia    Closed fracture of left femur, sequela       Plan:   Left greater trochanter femur fracture: 50% WBAT in LLE. PT/OT. Pain control. - ortho 2 week FU today     Dementia: resumed namenda and aricept per home regimen. Seroquel 25 as needed     HTN: resumed home lisinopril 10      Bowels: Per protocol  Bladder: Per protocol   Sleep: Trazodone provided prn. Pain: tylenol scheduled, tramadol PRN  DVT PPx: ambulating >250'  ARSENIO: 1/26    Team conference was held today on the patient and discussed directly with the patient utilizing their entire treatment team. Please see separate team note for details. Total treatment time for today's care >35 min. Gerard Hurst MD 1/24/2023, 8:23 AM    * This document was created using dictation software. While all precautions were taken to ensure accuracy, errors may have occurred. Please disregard any typographical errors.

## 2023-01-25 ENCOUNTER — TELEPHONE (OUTPATIENT)
Dept: INTERNAL MEDICINE CLINIC | Age: 88
End: 2023-01-25

## 2023-01-25 PROCEDURE — 97530 THERAPEUTIC ACTIVITIES: CPT

## 2023-01-25 PROCEDURE — 97116 GAIT TRAINING THERAPY: CPT

## 2023-01-25 PROCEDURE — 6370000000 HC RX 637 (ALT 250 FOR IP): Performed by: PHYSICAL MEDICINE & REHABILITATION

## 2023-01-25 PROCEDURE — 97110 THERAPEUTIC EXERCISES: CPT

## 2023-01-25 PROCEDURE — 97130 THER IVNTJ EA ADDL 15 MIN: CPT

## 2023-01-25 PROCEDURE — 1280000000 HC REHAB R&B

## 2023-01-25 PROCEDURE — 97535 SELF CARE MNGMENT TRAINING: CPT

## 2023-01-25 PROCEDURE — 99233 SBSQ HOSP IP/OBS HIGH 50: CPT | Performed by: NURSE PRACTITIONER

## 2023-01-25 PROCEDURE — 97129 THER IVNTJ 1ST 15 MIN: CPT

## 2023-01-25 RX ADMIN — DIPHENHYDRAMINE HYDROCHLORIDE 25 MG: 25 TABLET ORAL at 21:32

## 2023-01-25 RX ADMIN — STANDARDIZED SENNA CONCENTRATE AND DOCUSATE SODIUM 2 TABLET: 8.6; 5 TABLET ORAL at 14:10

## 2023-01-25 RX ADMIN — QUETIAPINE FUMARATE 25 MG: 25 TABLET ORAL at 19:33

## 2023-01-25 RX ADMIN — LISINOPRIL 10 MG: 10 TABLET ORAL at 10:06

## 2023-01-25 RX ADMIN — ACETAMINOPHEN 1000 MG: 500 TABLET ORAL at 14:11

## 2023-01-25 RX ADMIN — TRAMADOL HYDROCHLORIDE 50 MG: 50 TABLET ORAL at 03:53

## 2023-01-25 RX ADMIN — ACETAMINOPHEN 1000 MG: 500 TABLET ORAL at 10:06

## 2023-01-25 RX ADMIN — DONEPEZIL HYDROCHLORIDE 10 MG: 5 TABLET, FILM COATED ORAL at 19:33

## 2023-01-25 RX ADMIN — DORZOLAMIDE HYDROCHLORIDE 1 DROP: 20 SOLUTION/ DROPS OPHTHALMIC at 10:06

## 2023-01-25 RX ADMIN — MEMANTINE 5 MG: 5 TABLET ORAL at 19:33

## 2023-01-25 RX ADMIN — TRAMADOL HYDROCHLORIDE 50 MG: 50 TABLET ORAL at 22:34

## 2023-01-25 RX ADMIN — ACETAMINOPHEN 1000 MG: 500 TABLET ORAL at 19:33

## 2023-01-25 RX ADMIN — MEMANTINE 5 MG: 5 TABLET ORAL at 10:06

## 2023-01-25 ASSESSMENT — PAIN DESCRIPTION - ORIENTATION
ORIENTATION: RIGHT
ORIENTATION: LEFT
ORIENTATION: LEFT

## 2023-01-25 ASSESSMENT — PAIN DESCRIPTION - LOCATION
LOCATION: HIP

## 2023-01-25 ASSESSMENT — PAIN - FUNCTIONAL ASSESSMENT
PAIN_FUNCTIONAL_ASSESSMENT: ACTIVITIES ARE NOT PREVENTED

## 2023-01-25 ASSESSMENT — PAIN DESCRIPTION - DESCRIPTORS
DESCRIPTORS: ACHING;THROBBING;SORE
DESCRIPTORS: ACHING
DESCRIPTORS: ACHING;THROBBING

## 2023-01-25 ASSESSMENT — PAIN SCALES - GENERAL
PAINLEVEL_OUTOF10: 8
PAINLEVEL_OUTOF10: 0
PAINLEVEL_OUTOF10: 3
PAINLEVEL_OUTOF10: 0
PAINLEVEL_OUTOF10: 7

## 2023-01-25 NOTE — PROGRESS NOTES
Errol Reinoso  1/25/2023  [id number]    Chief Complaint: Closed fracture of left femur, sequela    Subjective:   Still agitated at night. Sitter remains. Vitals and labs appropriate. Asking repetitive questions. Not following weight restrictions. ROS: No CP, SOB, dyspnea    Objective:  Patient Vitals for the past 24 hrs:   BP Temp Temp src Pulse Resp SpO2   01/25/23 0353 -- -- -- -- 16 --   01/24/23 1922 126/66 97.7 °F (36.5 °C) Oral 64 16 96 %   01/24/23 0830 (!) 155/70 97.5 °F (36.4 °C) -- 55 18 96 %       Gen: No distress, pleasant. Resting in bedside chair  HEENT: Normocephalic, atraumatic. CV: Regular rate and rhythm. No MRG   Resp: No respiratory distress. CTAB   Abd: Soft, nontender, nondistended  Ext: No edema. Neuro: Alert, oriented, appropriately interactive. Higher level cognitive deficits present    Laboratory data: Available via EMR. Therapy progress:    PT    Supine to Sit: Independent  Sit to Supine: Independent   Sit to Stand: Independent  Chair/Bed to Chair Transfer: Independent  Car Transfer: Independent  Ambulation 10 ft: Supervision or touching assistance  Ambulation 50 ft: Supervision or touching assistance  Ambulation 150 ft: Supervision or touching assistance  Stairs - 1 Step: Supervision or touching assistance  Stairs - 4 Step: Partial/moderate assistance  Stairs - 12 Step:      OT    Eating: Setup or clean-up assistance  Oral Hygiene: Supervision or touching assistance  Bathing: Supervision or touching assistance  Upper Body Dressing: Setup or clean-up assistance  Lower Body Dressing: Partial/moderate assistance  Toilet Transfer: Supervision or touching assistance  Toilet Hygiene: Supervision or touching assistance    Speech Therapy    Pt continues to present with Moderate  and Severe  Cognitive-Linguistic Deficits. Difficulty/limitations persist with short term memory and fx carryover recalling what happened and where he is.  Continue to target fx short term memory and problem solving to increase level of independence at next level of care. Pt is to be d/c'd to an independent living facility, suspect pt will need increased supports/ assistance due to reduced short term memory, poor carryover, and agitation. Body mass index is 28.94 kg/m². Assessment:  Patient Active Problem List   Diagnosis    Benign prostatic hyperplasia without urinary obstruction    Mixed hyperlipidemia    Bradycardia    Primary hypertension    Pre-diabetes    Primary osteoarthritis of right knee    Memory impairment    Nipple soreness    Skin lesion on examination    Late onset Alzheimer's dementia without behavioral disturbance (HCC)    Vitamin D insufficiency    Nondisplaced fracture of greater trochanter of left femur, initial encounter for closed fracture (HCC)    Unable to ambulate    Alzheimer's dementia ([address]    Imbalance    Ataxia    Closed fracture of left femur, sequela       Plan:   Left greater trochanter femur fracture: 50% WBAT in LLE. PT/OT. Pain control. Ortho following. Increased distraction from prior. Pt incapable of following restrictions cognitively. Dementia: resumed namenda and aricept per home regimen. Seroquel 25 as needed     HTN: resumed home lisinopril 10      Bowels: Per protocol  Bladder: Per protocol   Sleep: Trazodone provided prn. Pain: tylenol scheduled, tramadol PRN  DVT PPx: ambulating >250'  ARSENIO: 1/26    Dispo: Prep d/c for tomorrow. Coral Mata MD 1/25/2023, 8:25 AM    * This document was created using dictation software. While all precautions were taken to ensure accuracy, errors may have occurred. Please disregard any typographical errors.

## 2023-01-25 NOTE — PROGRESS NOTES
[facility] [address] Department   Phone: [phone]    Speech Therapy    [] Initial Evaluation            [x] Daily Treatment Note         [x] Discharge Summary      Patient: Fátima Mendoza   : 1935   MRN: [id number]   Date of Service:  2023  Admitting Diagnosis: Closed fracture of left femur, sequela  Current Admission Summary: 58-year-old male with a history of BPH, COPD, HTN, HLD, and dementia who was admitted on  with left hip pain after a fall. Work-up revealed a greater trochanter fracture of the left femur. Ortho evaluated and suggested 50% weightbearing on the left lower extremity. He was evaluated by therapy and suggested to continue in an inpatient setting prior to returning home. He was previously living independently at home with the assistance of family. His evening was complicated by increased confusion requiring the placement of a sitter. He remains pleasantly confused this morning. Past Medical History:  has a past medical history of Benign prostatic hypertrophy without urinary obstruction, Bradycardia, COPD (chronic obstructive pulmonary disease) ([address] Dementia ([address] Glaucoma, Gout, Hyperlipidemia, Hypertension, Hypoxemia, Macular degeneration disease, Osteoarthritis, Sleep apnea, and Unspecified sleep apnea. Past Surgical History:  has a past surgical history that includes Total hip arthroplasty (2005); joint replacement; TURP; knee surgery; and Colonoscopy.   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 Pt daughter present for beginning of session but had to leave. Pt with ongoing perseverative questions re: where he was going, when he was leaving and asking where his car/ keys were. Pt with increased agitation and tearfulness due to current situation and poor carryover despite ongoing education, repetition and emotional support. Pain: Denies   Safety Interventions: patient left in chair, chair alarm in place, call light within reach, patient at risk for falls, telesitter in use, and sitter present. Therapeutic Interventions:     Session 1:   Cognition: Severity: Moderate  and Severe   Orientation Recall  - Pt oriented to self, place, situation, and date. Pt with independent use of calender to recall place and month, date, and year. Functional Recall  - Pt oriented to jovanni Lucero in room but became agitated questioning what d/c plans were and where he was going to go after leaving here   - Pt with perseverative questions throughout session asking where his wallet and car were. Asking how he would get into his house and what if the garage code does not work anymore. Pt tearful and frustrated when talking about not returning home as he was able to refer back to calendar throughout session with discharge destination. Ongoing support and redirection provided throughout.   - Pt required ongoing reminders and use of visual aid to indicate when he would be leaving rehab and where his car and wallet are located. - Pt able to independently use calender as reference x4 throughout session; additional mod cues required to assist with recall of calender use.   - Attempted to utilize distraction technique to move away from perseveration as a form of redirection with minimal success; pt able to be distracted for a short time period but quickly returned to perseveration of discharge plan and where items are located/ how he would get into his house. Additional Interventions:       Education  Barriers To Learning: cognition  Patient Education: Provided education regarding role of SLP, results of assessment, recommendations and general speech pathology plan of care. Learning Assessment: Pt verbalized understanding   Pt requires ongoing learning     Assessment  Impairments Requiring Therapeutic Intervention: Cognitive-Linguistic Deficits   Prognosis: fair (baseline dementia)    Clinical Assessment: Pt continues to present with Moderate  and Severe  Cognitive-Linguistic Deficits . Difficulty/limitations persist with short term memory and functional carryover recalling what happened, where he is, and discharge plan. Continue to target functional short term memory and problem solving to increase level of independence at next level of care. Pt is to be discharged to an independent living facility, suspect pt will need increased supports/ assistance due to reduced short term memory, poor carryover, and agitation. Plan  Frequency: 30 minutes/day; 5 days per week, as tolerated, until goals met, or discharged from ARU. Therapeutic Interventions: Cognitive-Linguistic intervention , Compensatory Cognitive intervention , and Patient/ Family education   Discharge Recommendations: 24 hour supervision pt to dc to the [facility] [place] facility, pt's family may benefit from education re: memory units at facilities as the [facility] is an independent living facility   Continued SLP at Discharge: Yes pending pt willingness to participate     Goals  Time Frame: 7-12 days     Pt will recall functional information (daily tasks, hip precautions, personal hx, etc.) with >70% accuracy with set up of cognitive aids.  Goal not met- Limited progress due to reduced carryover   Patient will complete functional problem-solving tasks for daily situations with 80% accuracy, given min cues. Goal not met- Limited progress   Patient will be instructed in memory strategies to utilize in order to promote recall of newly learned information with >70% min cues.  Goal not met- Limited progress due to reduced carryover       Therapy Session Time      Session 1   Time In 1245   Time Out 1315   Time Code Minutes 30   Individual Minutes 30     Timed Code Treatment Minutes: 30  Total Treatment Minutes: 30    Electronically Signed By:   Gordon Moore MA. CF- SLP  Speech Language Pathologist  [id number]

## 2023-01-25 NOTE — TELEPHONE ENCOUNTER
Patient's daughter, Lanre Rodriguez is calling regarding patient. She states he is in hospital at [facility] and will be released tomorrow. She states her and her brother have made arrangements for him to move into independent living at [facility] in [place]. Lanre Rodriguez said he is very agitated and telling her and her brother he wants to go home and not to [facility]. She said he has been falling a lot and she doesn't think he will be safe at home. Lanre Rodriguez is very upset and asked if Dr Michael Bynum can give her any advice to try and make this transition with her father.

## 2023-01-25 NOTE — PROGRESS NOTES
[facility] [facility]   Phone: [phone]    Occupational Therapy    [] Initial Evaluation            [x] Daily Treatment Note         [x] Discharge Summary      Patient: Shelly Pritchett   : 1935   MRN: [id number]   Date of Service:  2023    Admitting Diagnosis:  Closed fracture of left femur, sequela  Current Admission Summary: 60-year-old male with a history of BPH, COPD, HTN, HLD, and dementia who was admitted on  with left hip pain after a fall. Work-up revealed a greater trochanter fracture of the left femur. Ortho evaluated and suggested 50% weightbearing on the left lower extremity. He was evaluated by therapy and suggested to continue in an inpatient setting prior to returning home. He was previously living independently at home with the assistance of family. His evening was complicated by increased confusion requiring the placement of a sitter. He remains pleasantly confused this morning. Past Medical History:  has a past medical history of Benign prostatic hypertrophy without urinary obstruction, Bradycardia, COPD (chronic obstructive pulmonary disease) ([address] Dementia ([address] Glaucoma, Gout, Hyperlipidemia, Hypertension, Hypoxemia, Macular degeneration disease, Osteoarthritis, Sleep apnea, and Unspecified sleep apnea. Past Surgical History:  has a past surgical history that includes Total hip arthroplasty (2005); joint replacement; TURP; knee surgery; and Colonoscopy.     Discharge Recommendations: OT S3- 24 hour supervision and assist     DME Required For Discharge: rolling walker, shower chair with back, grab bars - toilet, grab bars - shower    Precautions/Restrictions: high fall risk, weight bearing, ROM restrictions  Weight Bearing Restrictions: partial weight bearing - 50 %  [] Right Upper Extremity  [] Left Upper Extremity [] Right Lower Extremity  [x] Left Lower Extremity     Required Braces/Orthotics: no braces required   [] Right  [] Left  Positional Restrictions: no active hip abduction     Pre-Admission Information   Lives With: alone                     Type of Home: house  Home Layout: two level, basement with full flight  Home Access:  1 step to enter with handrail. Handrails are located on R side. Bathroom Layout: tub/shower unit, walk in shower--walk-in shower in basement; tub shower on main level. Pt typically uses walk-in shower downstairs. Bathroom Equipment:  no prior equipment  Toilet Height: standard height  Home Equipment: no prior equipment  Transfer Assistance: Independent without use of device  Ambulation Assistance:Independent without use of device  ADL Assistance: independent with all ADL's  IADL Assistance: independent with homemaking tasks--son fills medications; pt able to take them independently every morning. Active :        [x] Yes                 [] No  Hand Dominance: [] Left                 [x] Right  Current Employment: retired. Occupation: real estate  Hobbies: golf with son every weekend  Recent Falls: no recent falls. Note: Social functional information acquired from acute evaluation on 1/12. Pt has baseline dementia. Daughter states, Denise Lee is functional but forgetful\". She indicates his confusion has been exacerbated since his admission due to unfamiliar setting and events of hospital course. Subjective  General: Patient in recliner upon arrival with sitter present. Agreeable to OT session. Patient continuously asking about discharge plan [facility]- stating \"I can make my own decisions\". Education provided to patient.    Pain: Patient does not rate upon questioning patient reporting left hip is sore but does not rate   Pain Interventions: patient denies pain interventions        Activities of Daily Living  Basic Activities of Daily Living  Grooming: modified independent  Grooming Comments: patient completed oral hygiene and shaved standing at sink with RW  Upper Extremity Bathing: setup assistance  Lower Extremity Bathing: supervision   Bathing Comments: patient completed shower seated on shower chair in walk in shower with hand held shower head. Patient used grab bars while in stance to wash rear periarea. Supervision while in stance. Upper Extremity Dressing: modified independent  Lower Extremity Dressing: modified independent  Dressing Comments: patient able to doff/roma shirt, socks, pants and underwear seated on shower chair and in stance. Toileting: modified independent. Toileting Comments: patient voided urine standing at toilet with use of Rw and grab bar   General Comments: no LOB noted during ADLs. Patient tolerated well     Instrumental Activities of Daily Living  No IADL completed on this date. Functional Mobility  Bed Mobility  Bed mobility not completed on this date. Transfers  Sit to stand transfer:modified independent  Stand to sit transfer: modified independent  Shower transfer: modified independent  Shower transfer equipment: shower seat with back, grab bars  Comments: to/from recliner, EOB, shower chair, and mat table with RW  Functional Mobility:  Sitting Balance: supervision. Sitting Balance Comment: shower chair and EOB  Standing Balance: modified independent, supervision. Standing Balance Comment: supervision while in stance in shower. Mod I with RW  Functional Mobility: . supervision  Functional Mobility Activity: to/from bathroom, to/from therapy room  Functional Mobility Device Use: rolling walker  Functional Mobility Comment: no LOB noted    Therapeutic Activity  Patient participated in BUE exercises seated edge of mat to improve strengthening and endurance needed for ADLs and activities of choice. Patient completed 20 reps of each exercise with 3 lb weight. Exercises included: forward punches, upward punches, bicep curls,  horizontal abduction/adduction, and supination/pronation.  Patient completed 20 modified seated crunch with yellow weighted ball in order to improve core strength for balance. Pt tolerated task well with seated rest breaks. Second Session  Patient in recliner upon arrival, agreeable to OT session. Patient reporting soreness in L hip but did not rate. Declined ice at end of session. Patient completed transfer from recliner to [address] [place] [address] mod I. Patient completed mobility during session with RW and supervision. Discussed with patient the difference using grab bars and standard toilet verses BSC overtop toilet. Patient able to complete toilet transfer with [facility] overtop mod I and transfer to/from standard toilet with SBA and grab bars. Patient completed mobility to therapy room with supervision and cues for weight bearing precautions. Patient educated on walker basket for item transportation. Patient able to retrieve from cabinets and transport to closet with RW, walker basket and supervision. Patient participated in [address] and Problem Solving scenarios. Task completed in order assess patient's safety awareness, problem solving, and functional cognition. Patient required cues and assist to identify safety concerns/problems in scenes and how to handle scenario. Task completed to highlight patient's impaired safety awareness and need for 24 hour supervision and assist. Patient completed mobility back to room with supervision. Patient left in recliner with call button in reach, chair alarm on, sitter present and all needs met.        Cognition  Overall Cognitive Status: Impaired  Arousal/Alterness: appropriate responses to stimuli  Following Commands: follows one step commands consistently  Attention Span: difficulty dividing attention  Memory: decreased recall of precautions, decreased recall of recent events, decreased short term memory  Safety Judgement: decreased awareness of need for assistance, decreased awareness of need for safety  Problem Solving: assistance required to generate solutions, assistance required to implement solutions, decreased awareness of errors  Insights: decreased awareness of deficits  Initiation: requires cues for some  Sequencing: requires cues for some  Comments: baseline dementia but increased confusion during hospital stay   Orientation:    oriented to person, disoriented to place, disoriented to time , and disoriented to situation  Command Following:   accurately follows one step commands    Education  Barriers To Learning: cognition  Patient Education: patient educated on goals, OT role and benefits, plan of care, precautions, ADL adaptive strategies, weight-bearing education, orientation, disease specific education, transfer training, discharge recommendations  Learning Assessment:  patient will require reinforcement due to cognitive deficits    Assessment  Activity Tolerance: fair-limited by cognition and pain  Impairments Requiring Therapeutic Intervention: decreased functional mobility, decreased ADL status, decreased strength, decreased safety awareness, decreased cognition, decreased endurance, decreased balance, decreased IADL, increased pain, decreased posture  Prognosis: good  Clinical Assessment: Pt is progressed fairly towards goals during hospital stay. Patient able to complete dressing, toileting and grooming mod I. Requiring supervision and setup for shower and supervision for mobility with RW due to weight bearing status. Patient limited by confusion and pain in hip. Patient will require 24 hour supervision and assist at discharge due to impaired cognition and safety awareness. Patient will benefit from continued OT services at discharge to maximize safety and independence in ADLs, transfers and mobility in new environment.    Safety Interventions: patient left in chair, chair alarm in place, call light within reach, patient at risk for falls, and sitter present    Plan  Frequency: 5 x/week, 75 min/day  Current Treatment Recommendations: strengthening, balance training, functional mobility training, transfer training, endurance training, patient/caregiver education, ADL/self-care training, IADL training, cognitive reorientation, safety education, and equipment evaluation/education  Goals  Patient Goals: to return to PLOF   Short Term Goals:  Time Frame: 2 weeks   Patient will complete upper body ADL at modified independent - goal met   Patient will complete lower body ADL at modified independent -goal met   Patient will complete toileting at modified independent - goal met   Patient will complete functional transfers at modified independent- goal met   Patient to gather and transport IADL items at modified independent - not met, supervision       Therapy Session Time  First Session Therapy Time:   Individual Group Co-treatment   Time In 835     Time Out 935     Minutes 60        Second Session Therapy Time:   Individual Group Co-treatment   Time In 1115     Time Out 1145     Minutes 30         Timed Code Treatment Minutes: 60 + 30 minutes     Total Treatment Minutes:  90 minutes      James Milian OTR/L [id number]

## 2023-01-25 NOTE — PROGRESS NOTES
[id number] [facility] Orthopedic Surgery  Progress Note    Chief complaint: LEFT hip pain    HPI: The patient is seen sitting up in the chair in acute rehab. He reports minimal left hip pain only with certain movements. In speaking with therapists he has not been adhering to the 50% weightbearing. He is ambulating with a walker. Discharge plan is to go to the [facility]. Review of Systems:  Constitutional: Negative for fever, chills, fatigue. Skin:  Negative for pruritis, rash  Eyes: Negative for photophobia and visual disturbance. ENT:  Negative for rhinorrhea, epistaxis, sore throat  Respiratory:  Negative for cough and shortness of breath. Cardiovascular: Negative for chest pain. Gastrointestinal: Negative for nausea, vomiting, diarrhea. Genitourinary: Negative for dysuria and difficulty urinating. Neurological: Negative for confusion, dysarthria, tremors, seizures. Psychiatric:  Negative for depression or anxiety  Musculoskeletal:  Positive for left hip pain. Objective:  Vitals:    01/25/23 0828   BP: (!) 166/67   Pulse: 56   Resp: 16   Temp: 97.9 °F (36.6 °C)   SpO2: 96%      Physical Examination:  GENERAL: No apparent distress, well-nourished  SKIN:  Warm and dry  EYES: Nonicteric. ENT: Mucous membranes moist  HEAD: Normocephalic, atraumatic  RESPIRATORY: Resp easy and unlabored  CARDIOVASCULAR: Regular rate and rhythm  GI: Abdomen soft, nontender  NEURO: Awake and alert. No speech defect  PSYCHIATRIC: Appropriate affect; not agitated  MUSCULOSKELETAL:  LEFT hip  Inspection: On exam there are no ulcerations, rashes or lesions about the left hip. There is only minimal pain to palpation of the left greater trochanter. Healed surgical incision. No open areas. Motor: Intact DF/PF on the left  Sensation: Grossly intact to light touch throughout the left lower extremity in all nerve distributions. Vascular:  2+ left DP pulse.     Labs reviewed:  Recent Labs     01/23/23  0515   WBC 5.7   HGB 10.7* HCT 31.4*        Recent Labs     01/23/23  0515      K 4.0      CO2 24   BUN 23*   CREATININE 0.8   GLUCOSE 91   CALCIUM 8.4     No results for input(s): INR, PROTIME in the last 72 hours. Lab Results   Component Value Date    COLORU YELLOW 02/24/2012    CLARITYU CLEAR 02/24/2012    PHUR 7.5 02/24/2012    GLUCOSEU NEGATIVE 02/24/2012    BLOODU NEGATIVE 02/24/2012    LEUKOCYTESUR NEGATIVE 02/24/2012    BILIRUBINUR NEGATIVE 02/24/2012    UROBILINOGEN 0.2 02/24/2012       Imaging:  XR HIP 2-3 VW W PELVIS LEFT   Final Result   Fracture of the superior aspect of the left greater trochanter demonstrates   increased distraction as compared to prior. There is some increased distraction of the left periprosthetic greater trochanter fracture fragment compared to prior films 2 weeks ago. Femoral stem remains stable. IMPRESSION:  LEFT minimally displaced greater trochanter fracture with stable total hip arthroplasty (1/11/2023)  Dementia  Principal Problem:    Closed fracture of left femur, sequela  Resolved Problems:    * No resolved hospital problems. *      PLAN:  Femoral stem remains stable, will continue with nonoperative treatments. - 50% WB LLE with walker; NO active left hip ABDuction  - Ice therapy  - Pain control: scheduled tylenol and tramadol prn. Try and limit narcotics as possible given cognition.   - DVT prophylaxis: per primary team  - PT/OT  - Dispo: SNF at discharge from ARU  Follow-up with Dr. Shorty Godfrey in 1 month for repeat films.  [address], APRN - CNP  1/25/2023  1:40 PM

## 2023-01-25 NOTE — PROGRESS NOTES
[facility] [address] Department   Phone: [phone]    Physical Therapy    [] Initial Evaluation            [x] Daily Treatment Note         [x] Discharge Summary      Patient: Sotero Viera   : 1935   MRN: [id number]   Date of Service:  2023  Admitting Diagnosis: Closed fracture of left femur, sequela  Current Admission Summary: 50-year-old male with a history of BPH, COPD, HTN, HLD, and dementia who was admitted on  with left hip pain after a fall. Work-up revealed a greater trochanter fracture of the left femur. Ortho evaluated and suggested 50% weightbearing on the left lower extremity. He was evaluated by therapy and suggested to continue in an inpatient setting prior to returning home. He was previously living independently at home with the assistance of family. His evening was complicated by increased confusion requiring the placement of a sitter. He remains pleasantly confused this morning. Past Medical History:  has a past medical history of Benign prostatic hypertrophy without urinary obstruction, Bradycardia, COPD (chronic obstructive pulmonary disease) ([address] Dementia ([address] Glaucoma, Gout, Hyperlipidemia, Hypertension, Hypoxemia, Macular degeneration disease, Osteoarthritis, Sleep apnea, and Unspecified sleep apnea. Past Surgical History:  has a past surgical history that includes Total hip arthroplasty (2005); joint replacement; TURP; knee surgery; and Colonoscopy.   Discharge Recommendations: Home with Home Health Physical Therapy, 24 hr supervision secondary to cognitive deficits  DME Required For Discharge: rolling walker  Precautions/Restrictions: high fall risk, weight bearing, ROM restrictions  Weight Bearing Restrictions: partial weight bearing - 50 %  [] Right Upper Extremity  [] Left Upper Extremity [] Right Lower Extremity  [x] Left Lower Extremity     Required Braces/Orthotics: no braces required   [] Right  [] Left  Positional Restrictions:No Active Hip ABduction    Pre-Admission Information    ** Pre-admission information gained from family members in acute care setting **  Lives With: alone                     Type of Home: house  Home Layout: two level, basement with full flight  Home Access:  1 step to enter with handrail. Handrails are located on R side. Bathroom Layout: tub/shower unit, walk in shower--walk-in shower in basement; tub shower on main level. Pt typically uses walk-in shower downstairs. Bathroom Equipment:  no prior equipment  Toilet Height: standard height  Home Equipment: no prior equipment  Transfer Assistance: Independent without use of device  Ambulation Assistance:Independent without use of device  ADL Assistance: independent with all ADL's  IADL Assistance: independent with homemaking tasks--son fills medications; pt able to take them independently every morning. Active :        [x] Yes                 [] No  Hand Dominance: [] Left                 [x] Right  Current Employment: retired. Occupation: real estate  Hobbies: golf with son every weekend  Recent Falls: no recent falls. Examination   Vision:   Vision Corrective Device: wears glasses for reading  Hearing:   [place]      Subjective  General: Patient seated in recliner upon entry, agreeable to therapy. Pt remains disoriented to location and situation with occasional recall of injury, and unable retain situation within session despite frequent education and orientation. Pain: Patient does not rate upon questioning  Pain Interventions: Not applicable       Functional Mobility  Bed Mobility   Bed mobility not completed on this date. Comments:  Transfers  Sit to stand transfer: modified independent  Stand to sit transfer: modified independent  Stand step transfer: modified independent  Comments: All performed with use of RW.     Ambulation  Surface:level surface  Assistive Device: rolling walker  Assistance: supervision  Distance: 470 + 80 ft  Gait Mechanics: Step-through pattern with mild forward flexed posture, increased UE reliance, and normal step length. Increased gait speed this date. Comments:  VC provided to slow down and to encourage increased UE use for maintenance of 50% WB. Ambulation Trial 2  Surface:carpet  Assistive Device: rolling walker  Assistance: supervision  Distance: 20 ft  Gait Mechanics: Unchanged from level surface  Comments: VC provided for AD management on carpet  Stair Mobility  Number of Steps: 12  Step Height: 6 inch  Hand Rails: (B) handrail  Assistance: supervision  Comments: VC to increase UE support to maintain 50% WB and for appropriate sequencing. Wheelchair Mobility:   No w/c mobility completed on this date. Comments:  Balance  Static Sitting Balance: good: independent with functional balance in unsupported position  Dynamic Sitting Balance: good: independent with functional balance in unsupported position  Static Standing Balance: fair (+): maintains balance at SBA/supervision without use of UE support  Dynamic Standing Balance: fair: maintains balance at CGA without use of UE support  Patient completes object retrieval from floor in standing position at modified independent, with one UE support on RW  Comments:    Other Therapeutic Interventions   First Session:   See functional mobility above. In addition patient completes dynamic standing activities at [place] [address] without UE support: 2 kg weighted ball toss on rebounder on airex 1 x 30 and modified tandem with (L) LE on 4 in step 1 x 30, ball toss + catch with velcro paddle on airex, swiss ball dribble on ground alternating hands with (L) LE forward on airex in modified tandem 1 x 50, (L) LE 4 in step taps 1 x 20. STS from elevated chair with airex underneath (B) LE using only one UE to push up from surface 1 x 5. No major LOB occurred. Patient utilized increased hip strategy and HHA on RW to self-correct any LOB.        Second Session:     Patient seated in recliner upon entry, agreeable to therapy. Patient presenting with increased frustration this session secondary to cognitive deficits requiring frequent reorientation throughout the session. All transfers performed at mod I with RW. Patient ambulated 200' + [de-identified]' with RW at supervision. In addition, core stability exercises performed 2 x 10 ea at SBA: modified crunches, standing paloff press with 8.8# ball, standing OH press with 8.8# ball. Patient c/o nausea during today's session and was given ginger ale to help relieve this. Seated rest break taken until patient's nausea eased. Patient returned to recliner with chair alarm in place and call light within reach. Cognition  Overall Cognitive Status: Impaired  Arousal/Alterness: appropriate responses to stimuli  Following Commands: follows one step commands consistently  Attention Span: attends with cues to redirect, difficulty dividing attention  Memory: decreased recall of precautions, decreased short term memory, decreased long term memory  Safety Judgement: decreased awareness of need for assistance, decreased awareness of need for safety  Problem Solving: assistance required to generate solutions, assistance required to implement solutions, decreased awareness of errors  Insights: decreased awareness of deficits  Initiation: requires cues for some  Sequencing: requires cues for some  Comments: Frequent VC for reorientation to surroundings. Patient carryover within conversation/directions less than 5 minute before requiring repetition including recall of hip fracture.   Command Following:   impaired    Education  Barriers To Learning: cognition  Patient Education: patient educated on goals, PT role and benefits, plan of care, precautions, weight-bearing education, general safety, functional mobility training, proper use of assistive device/equipment, orientation, family education, transfer training  Learning Assessment:  patient will require reinforcement due to cognitive deficits    Assessment  Activity Tolerance: Limited by cognitive deficits with no carryover of education/situation. Impairments Requiring Therapeutic Intervention: decreased functional mobility, decreased ADL status, decreased ROM, decreased strength, decreased safety awareness, decreased cognition, decreased endurance, decreased balance, increased pain, decreased posture  Prognosis: fair  Clinical Assessment: Patient has met three out of five goals at this time. Patient has progressed to modified independent level for bed mobility and transfers but continues to require supervision with ambulation and stair navigation secondary to cognitive deficits limiting his ability to self-recall and maintain WB precautions. As a result, assistance is required in order to ensure safety with WB. Patient demonstrates no carry over of safety techniques session to session and requires reinforcement multiple times within a session secondary to poor STM. The plan is for patient to discharge to new home environment at independent living setting established by daughter. Discussions have been held with daughter regarding therapists concern of lack of supervision in [place] setting however plan remains unchanged. It has been recommended that 24 supervision be initially provided until it is determined if patient is able to safely function in new home environment. Safety Interventions: patient left in chair, chair alarm in place, call light within reach, gait belt, telesitter in use, and sitter present    Plan  Frequency: 5 x/week, 75 min/day  Current Treatment Recommendations: strengthening, ROM, balance training, functional mobility training, transfer training, gait training, stair training, endurance training, neuromuscular re-education, modalities, patient/caregiver education, ADL/self-care training, cognitive reorientation, pain management, and safety education    Goals  Patient Goals:  To return home Short Term Goals:  Time Frame: 2 weeks  Patient will complete bed mobility at modified independent - goal met 1/24/2023  Patient will complete transfers at modified independent - goal met 1/24/2023  Patient will ambulate 150 ft with use of rolling walker at modified independent - goal not met 1/25/2023  Patient will ascend/descend 4 stairs with (R) ascending handrail at modified independent - goal not met 1/25/2023  Patient will complete car transfer at modified independent - goal met 1/24/2023    Therapy Session Time    First Session Therapy Time:   Individual Concurrent Group Co-treatment   Time In  1002         Time Out  1102         Minutes  60             Second Session Therapy Time:   Individual Concurrent Group Co-treatment   Time In  1323         Time Out  1353         Minutes  30           Timed Code Treatment Minutes:  60 + 30 minutes    Total Treatment Minutes:  90 minutes      Electronically Signed By:     Rodney Barajas SPT  Therapist observed and directed the patient's plan of care.   Co-signed and supervised by: Yesenia Grewal PT, DPT - [id number]

## 2023-01-25 NOTE — PLAN OF CARE
Problem: Discharge Planning  Goal: Discharge to home or other facility with appropriate resources  1/24/2023 2110 by Romaine Trotter RN  Outcome: Progressing  Flowsheets (Taken 1/24/2023 1922)  Discharge to home or other facility with appropriate resources: Identify barriers to discharge with patient and caregiver  1/24/2023 1200 by Rachell Lucia RN  Outcome: Progressing     Problem: Pain  Goal: Verbalizes/displays adequate comfort level or baseline comfort level  1/24/2023 2110 by Romaine Trotter RN  Outcome: Progressing  Flowsheets (Taken 1/24/2023 1922)  Verbalizes/displays adequate comfort level or baseline comfort level: Encourage patient to monitor pain and request assistance  1/24/2023 1200 by Rachell Lucia RN  Outcome: Progressing     Problem: Skin/Tissue Integrity  Goal: Absence of new skin breakdown  Description: 1. Monitor for areas of redness and/or skin breakdown  2. Assess vascular access sites hourly  3. Every 4-6 hours minimum:  Change oxygen saturation probe site  4. Every 4-6 hours:  If on nasal continuous positive airway pressure, respiratory therapy assess nares and determine need for appliance change or resting period.   1/24/2023 2110 by Romaine Trotter RN  Outcome: Progressing  1/24/2023 1200 by Rachell Lucia RN  Outcome: Progressing     Problem: Safety - Adult  Goal: Free from fall injury  1/24/2023 2110 by Romaine Trotter RN  Outcome: Fer Sanderson (Taken 1/24/2023 2105)  Free From Fall Injury: Instruct family/caregiver on patient safety  1/24/2023 1200 by Rachell Lucia RN  Outcome: Progressing     Problem: ABCDS Injury Assessment  Goal: Absence of physical injury  1/24/2023 2110 by Romaine Trotter RN  Outcome: Progressing  Flowsheets (Taken 1/24/2023 2105)  Absence of Physical Injury: Implement safety measures based on patient assessment  1/24/2023 1200 by Rachell Lucia RN  Outcome: Progressing

## 2023-01-26 VITALS
DIASTOLIC BLOOD PRESSURE: 74 MMHG | RESPIRATION RATE: 17 BRPM | SYSTOLIC BLOOD PRESSURE: 144 MMHG | WEIGHT: 207.5 LBS | OXYGEN SATURATION: 95 % | TEMPERATURE: 97.7 F | HEART RATE: 54 BPM | BODY MASS INDEX: 29.05 KG/M2 | HEIGHT: 71 IN

## 2023-01-26 PROCEDURE — 6370000000 HC RX 637 (ALT 250 FOR IP): Performed by: PHYSICAL MEDICINE & REHABILITATION

## 2023-01-26 PROCEDURE — 94760 N-INVAS EAR/PLS OXIMETRY 1: CPT

## 2023-01-26 RX ADMIN — MEMANTINE 5 MG: 5 TABLET ORAL at 07:17

## 2023-01-26 RX ADMIN — QUETIAPINE FUMARATE 25 MG: 25 TABLET ORAL at 07:04

## 2023-01-26 RX ADMIN — ACETAMINOPHEN 1000 MG: 500 TABLET ORAL at 07:17

## 2023-01-26 RX ADMIN — LISINOPRIL 10 MG: 10 TABLET ORAL at 07:17

## 2023-01-26 RX ADMIN — TRAMADOL HYDROCHLORIDE 50 MG: 50 TABLET ORAL at 06:41

## 2023-01-26 RX ADMIN — STANDARDIZED SENNA CONCENTRATE AND DOCUSATE SODIUM 2 TABLET: 8.6; 5 TABLET ORAL at 07:37

## 2023-01-26 RX ADMIN — DORZOLAMIDE HYDROCHLORIDE 1 DROP: 20 SOLUTION/ DROPS OPHTHALMIC at 07:20

## 2023-01-26 ASSESSMENT — PAIN DESCRIPTION - ORIENTATION
ORIENTATION: LEFT

## 2023-01-26 ASSESSMENT — PAIN SCALES - WONG BAKER: WONGBAKER_NUMERICALRESPONSE: 4

## 2023-01-26 ASSESSMENT — PAIN - FUNCTIONAL ASSESSMENT
PAIN_FUNCTIONAL_ASSESSMENT: ACTIVITIES ARE NOT PREVENTED

## 2023-01-26 ASSESSMENT — PAIN DESCRIPTION - DESCRIPTORS
DESCRIPTORS: ACHING;DISCOMFORT
DESCRIPTORS: ACHING;SORE
DESCRIPTORS: ACHING;SORE

## 2023-01-26 ASSESSMENT — PAIN DESCRIPTION - PAIN TYPE
TYPE: ACUTE PAIN
TYPE: ACUTE PAIN

## 2023-01-26 ASSESSMENT — PAIN SCALES - GENERAL
PAINLEVEL_OUTOF10: 2
PAINLEVEL_OUTOF10: 7
PAINLEVEL_OUTOF10: 2
PAINLEVEL_OUTOF10: 4
PAINLEVEL_OUTOF10: 6
PAINLEVEL_OUTOF10: 2
PAINLEVEL_OUTOF10: 5

## 2023-01-26 ASSESSMENT — PAIN DESCRIPTION - FREQUENCY
FREQUENCY: CONTINUOUS
FREQUENCY: CONTINUOUS

## 2023-01-26 ASSESSMENT — PAIN DESCRIPTION - LOCATION
LOCATION: HIP

## 2023-01-26 ASSESSMENT — PAIN DESCRIPTION - ONSET
ONSET: ON-GOING
ONSET: ON-GOING

## 2023-01-26 NOTE — PROGRESS NOTES
Outreach call to [employer], No RN to accept report, [facility] requesting call be made to  (CM) called Stacey Askew ([phone]) with [address] call to [facility], [phone] and provided report.      Michelle MANZO, RN   [phone]

## 2023-01-26 NOTE — DISCHARGE INSTR - COC
Continuity of Care Form    Patient Name: Fátima Mendoza   :  1935  MRN:  [id number]    Admit date:  2023  Discharge date:  2023    Code Status Order: DNR-CC   Advance Directives:     Admitting Physician:  Rachel Hernandez MD  PCP: Kyra Pimentel DO    Discharging Nurse: Renetta MANZO, THE [facility] Unit/Room#: XJB-2462/5772-55  Discharging Unit Phone Number: [phone]    Emergency Contact:   Extended Emergency Contact Information  Primary Emergency Contact: [address] [place] [address] Phone: [phone]  Mobile Phone: [phone]  Relation: Child  Preferred language: English  Secondary Emergency Contact: Morgan Harrell  Mobile Phone: [phone]  Relation: Child  Preferred language: English   needed?  No    Past Surgical History:  Past Surgical History:   Procedure Laterality Date    COLONOSCOPY      JOINT REPLACEMENT      hip blateral    KNEE SURGERY      TOTAL HIP ARTHROPLASTY  2005    TURP         Immunization History:   Immunization History   Administered Date(s) Administered    COVID-19, PFIZER PURPLE top, DILUTE for use, (age 15 y+), 30mcg/0.3mL 2021, 2021    Influenza A ([id number]) Vaccine PF IM 2009    Influenza Vaccine, unspecified formulation 2016    Influenza Virus Vaccine 10/24/2011, 2015    Influenza Whole 2015    Influenza, High Dose (Fluzone 65 yrs and older) 2016, 10/04/2017, 10/10/2018    Influenza, Triv, inactivated, subunit, adjuvanted, IM (Fluad 65 yrs and older) 2019    Pneumococcal Conjugate 13-valent ([id number]) 2015    Pneumococcal Polysaccharide (Ysglrqsow72) 2007       Active Problems:  Patient Active Problem List   Diagnosis Code    Benign prostatic hyperplasia without urinary obstruction N40.0    Mixed hyperlipidemia E78.2    Bradycardia R00.1    Primary hypertension I10    Pre-diabetes R73.03    Primary osteoarthritis of right knee M17.11    Memory impairment R41.3    Nipple soreness N64.4    Skin lesion on examination L98.9    Late onset Alzheimer's dementia without behavioral disturbance (HCC) G30.1, F02.80    Vitamin D insufficiency E55.9    Nondisplaced fracture of greater trochanter of left femur, initial encounter for closed fracture ([facility] S72.115A    Unable to ambulate R26.2    Alzheimer's dementia (HCC) G30.9, F02.80    Imbalance R26.89    Ataxia R27.0    Closed fracture of left femur, sequela S72.92XS       Isolation/Infection:   Isolation            No Isolation          Patient Infection Status       None to display          - Abrasion L Kneefrom fall healing open to air, no dressing changes    Nurse Assessment:  Last Vital Signs: BP (!) 144/74   Pulse 54   Temp 97.7 °F (36.5 °C) (Oral)   Resp 17   Ht 5' 11\" (1.803 m)   Wt 207 lb 8 oz (94.1 kg)   SpO2 95%   BMI 28.94 kg/m²     Last documented pain score (0-10 scale): Pain Level: 7  Last Weight:   Wt Readings from Last 1 Encounters:   23 207 lb 8 oz (94.1 kg)     Mental Status:  disoriented and alert= uses calendar to say date/year. Forgetful. Able to state name/, requires frequent redirection and reorientation     IV Access:  - None    Nursing Mobility/ADLs:  Walking   Independent x1 walker, 50% weight bearing LLE  Transfer  Independent x1 walker, 50% weight bearing LLE  Bathing  Independent  Dressing  Independent set up  [address]- whole with water, needs assistance with med set up  Med Delivery   whole with water, needs assistance with med set up    Wound Care Documentation and Therapy:        Elimination:  Continence: Bowel: Yes  Bladder: Yes  Urinary Catheter: None   Colostomy/Ileostomy/Ileal Conduit: No       Date of Last BM: 2023  No intake or output data in the 24 hours ending 23 0833  I/O last 3 completed shifts:   In: 240 [P.O.:240]  Out: -     Safety Concerns:     Sundowners Sund and At Risk for Falls    Impairments/Disabilities: None    Nutrition Therapy:  Current Nutrition Therapy:   - Oral Diet:  General    Routes of Feeding: Oral  Liquids: Thin Liquids  Daily Fluid Restriction: no  Last Modified Barium Swallow with Video (Video Swallowing Test): not done    Treatments at the Time of Hospital Discharge:   Respiratory Treatments: n/a  Oxygen Therapy:  is not on home oxygen therapy. Ventilator:    - No ventilator support    Rehab Therapies: Physical Therapy and Occupational Therapy  Weight Bearing Status/Restrictions: Partial weight bearing (30-50%) only on leg left leg with [facility]  Other [address] (for information only, NOT a DME order):  [address] walker-delivered to bedside prior to discharge     Patient's personal belongings (please select all that are sent with patient):  Pt discharged with all personal belongings at time of discharge    RN SIGNATURE:  Electronically signed by Talisha Wong RN on 1/26/23 at 10:44 AM EST    CASE MANAGEMENT/SOCIAL WORK SECTION    Inpatient Status Date: ***    Readmission Risk Assessment Score:  Readmission Risk              Risk of Unplanned Readmission:  11           Discharging to Facility/ Agency   Name:   Address:  Phone:  Fax:    Dialysis Facility (if applicable)   Name:  Address:  Dialysis Schedule:  Phone:  Fax:    / signature: {Esignature:[id number]}    PHYSICIAN SECTION    Prognosis: Fair    Condition at Discharge: Stable    Rehab Potential (if transferring to Rehab): Fair    Recommended Labs or Other Treatments After Discharge: PT/OT/SLP/RN/Aide    Physician Certification: I certify the above information and transfer of Kimberlyn Almanza  is necessary for the continuing treatment of the diagnosis listed and that he requires [address] for less 30 days.      Update Admission H&P: No change in H&P    PHYSICIAN SIGNATURE:  Electronically signed by Rizwana Parsons MD on 1/26/23 at 8:34 AM EST

## 2023-01-26 NOTE — PROGRESS NOTES
Lorne came to pt's room for pt to sign for [employer]. Pt unclear on reason for walker and reason he had to sign form. After DME provider left. Pt asked how item would be paid for. Due to cost question, pt encouraged to ask his daughter about form. Pt continually asking questions about \"were am I going? \" Pt offered redirection several time from both sitter/ and staff nurse. Pt assisted with ambulation and brushing his teeth. Pt dressed and asking continually where his shoes and, wallet and keys are. Pt offered reassurance that his belongings he is asking about were taken home by his children Chente William and daughter García Donis. Ran Moreno [phone]     Outreach call to daughter to ensure she brought pt shoes to wear. Outreach call to dietary due to no tray being sent or ordered for pt.       Michelle CRAIGN, RN   [phone]

## 2023-01-26 NOTE — CARE COORDINATION
Patient discharged home via car with daughter- Nuvia Mondragon to [facility]. Patient to receive home nursing and therapy from [facility]. Patient received a Rolling Walker from [employer] supplier.  No further needs voiced by patient or treatment team.       Electronically signed by SONDRA Harry on 1/26/2023 at 1:16 PM

## 2023-01-26 NOTE — CARE COORDINATION
Discharge Planning:     (CM) called Luciana Dozier ([phone]) with [facility] and notified her that the Patient would be discharging today. CM also called Joselin Sorenson with [employer] and notified him that the Patient is discharging today and is in need of a Rolling Walker via [employer].        Electronically signed by SONDRA Cardenas on 1/26/2023 at 8:48 AM

## 2023-01-26 NOTE — CARE COORDINATION
Lorne received a referral to this patient for a rolling walker. Rolling walker has been delivered to the patients room. Thank you for the referral.  Electronically signed by Kashif Kinney on 1/26/2023 at 11:06 AM  Cell ph# [phone]    NOTE: After 5:00 pm, Weekends, Holidays: Call [facility] at [phone] to coordinate delivery of home medical equipment.

## 2023-01-26 NOTE — PLAN OF CARE
Problem: Discharge Planning  Goal: Discharge to home or other facility with appropriate resources  Outcome: Progressing  Flowsheets (Taken 1/25/2023 2109)  Discharge to home or other facility with appropriate resources: Identify barriers to discharge with patient and caregiver     Problem: Pain  Goal: Verbalizes/displays adequate comfort level or baseline comfort level  Outcome: Progressing  Flowsheets (Taken 1/25/2023 2309)  Verbalizes/displays adequate comfort level or baseline comfort level: Encourage patient to monitor pain and request assistance     Problem: Skin/Tissue Integrity  Goal: Absence of new skin breakdown  Description: 1. Monitor for areas of redness and/or skin breakdown  2. Assess vascular access sites hourly  3. Every 4-6 hours minimum:  Change oxygen saturation probe site  4. Every 4-6 hours:  If on nasal continuous positive airway pressure, respiratory therapy assess nares and determine need for appliance change or resting period.   Outcome: Progressing     Problem: Safety - Adult  Goal: Free from fall injury  Outcome: Progressing     Problem: ABCDS Injury Assessment  Goal: Absence of physical injury  Outcome: Progressing

## 2023-01-26 NOTE — PROGRESS NOTES
[facility] Discharge Assessment    Transportation  \"Has lack of transportation kept you from medical appointments, meetings, work, or from getting things needed for daily living? \"Check all that apply:  [] A.  Yes, it has kept me from medical appointments or from getting my medications  [] B.  Yes, it has kept me from non-medical meetings, appointments, work, or from getting things that I need  [x] C.  No  [] X. Patient unable to respond  [] Y. Patient declines to respond    Provision of Current Reconciled Medication List to Subsequent Provider at Discharge  [] No, current reconciled medication list not provided to the subsequent provider. [x] Yes, current reconciled medication list provided to the subsequent provider. (**Select route of transmission below**)   [x] Via Electronic Health Record   [x] [employer]  [] Verbal (e.g. in person, telephone, video conferencing)  [x] Paper-based (e.g. fax, copies, printouts)   [] Other Methods (e.g. texting, email, CDs)    Provision of Current Reconciled Medication List to Patient at Discharge  [] No, current reconciled medication list not provided to the patient, family and/or caregiver. [x] Yes, current reconciled medication list provided to the patient, family and/or caregiver.  (**Select route of transmission below**)   [x] Via Electronic Health Record (e.g., electronic access to patient portal)   [] Via [employer] [facility]  [x] Verbal (e.g. in person, telephone, video conferencing)  [x] Paper-based (e.g. fax, copies, printouts)   [] Other Methods (e.g. texting, email, CDs)    Health Literacy  \"How often do you need to have someone help you when you read instructions, pamphlets, or other written material from your doctor or pharmacy? \"  [x]  0. Never  []  1. Rarely  []  2. Sometimes  []  3. Often  []  4. Always  []  8.   Patient unable to respond    BIMS - **Must be completed in the flowsheet at discharge prior to proceeding with Delirium Assessment**  [x] BIMS completed in flowsheet at discharge    Signs and Symptoms of Delirium  A. Acute Onset Mental Status Change - Is there evidence of an acute change in mental status from the patient's baseline? [x] 0. No  [] 1. Yes    B. Inattention - Did the patient have difficulty focusing attention, for example being easily distractible or having difficulty keeping track of what was being said?  []  0. Behavior not present  []  1. Behavior continuously present, does not fluctuate  [x]  2. Behavior present, fluctuates (comes and goes, changes in severity)    C. Disorganized thinking - Was the patient's thinking disorganized or incoherent (rambling or irrelevant conversation, unclear or illogical flow of ideas, or unpredictable switching from subject to subject)? []  0. Behavior not present  []  1. Behavior continuously present, does not fluctuate  [x]  2. Behavior present, fluctuates (comes and goes, changes in severity)    D. Altered level of consciousness - Did the patient have altered level of consciousness as indicated by any of the following criteria? Vigilant - startled easily to any sound or touch  Lethargic - repeatedly dozed off while being asked questions, but responded to voice or touch  Stuporous - very difficulty to arouse and keep aroused for the interview  Comatose - could not be aroused  [x]  0. Behavior not present  []  1. Behavior continuously present, does not fluctuate  []  2. Behavior present, fluctuates (comes and goes, changes in severity)    Mood    \"Over the last 2 weeks, have you been bothered by any of the following problems?\" 1. Symptom Presence    0 = No  1 = Yes  9 = No Response 2.  Symptom Frequency    0 = Never or 1 day  1 = 2-6 days (several days)  2 = 7-11 days (half or more of the days)  3 = 12-14 days (nearly every day)  **Leave blank if 'No Reponse'**      Enter scores in boxes    Column 1 Column 2   Little interest or pleasure in doing things   1 3   Feeling down, depressed, or hopeless   0 0   **If either A or B in column 2 is coded 2 or 3, CONTINUE asking the questions below. If not, END the interview. **     Trouble falling or staying asleep, or sleeping too much   1 3   Feeling tired or having little energy   0 0   Poor appetite or overeating   0 0   Feeling bad about yourself - or that you are a failure or have let yourself or your family down   0 0   Trouble concentrating on things, such as reading the newspaper or watching television   0 0   Moving or speaking so slowly that other people could have noticed. Or the opposite- being so fidgety or restless that you have been moving around a lot more than usual.   0 0   Thoughts that you would be better off dead, or of hurting yourself in some way. 0 0   Total Severity: Add scores for all frequency responses in column 2 (possible score 0-27, or enter 99 if unable to complete (if symptom frequency (column 2) is blank for 3 or more items). 6     Social Isolation  \"How often do you feel lonely or isolated from those around you? \"  [x] 0. Never  [] 1. Rarely  [] 2. Sometimes  [] 3. Often  [] 4. Always  [] 7. Patient declines to respond  [] 8. Patient unable to respond    Pain Effect on Sleep  \"Over the past 5 days, how much of the time has pain made it hard for you to sleep at night? \"  []  0. Does not apply - I have not had any pain or hurting in the past 5 days  [x]  1. Rarely or not at all  []  2. Occasionally  []  3. Frequently  []  4. Almost constantly  []  8. Unable to answer    **If the patient answers \"0. Does not apply\" to this question, skip the next two \"Pain Effect. Algis Broaden Algis Broaden \" questions**    Pain Interference with Therapy Activities  \"Over the past 5 days, how often have you limited your participation in rehabilitation therapy sessions due to pain? \"  []  0. Does not apply - I have not received rehabilitation therapy in the past 5 days  [x]  1.   Rarely or not at all  []  2. Occasionally  []  3. Frequently  []  4. Almost constantly  []  8. Unable to answer    Pain Interference with Day-to-Day Activities: \"Over the past 5 days, how often have you limited your day-to-day activities (excluding rehabilitation therapy session)? \"  [x]  1. Rarely or not at all  []  2. Occasionally  []  3. Frequently  []  4. Almost constantly  []  8. Unable to answer    Nutritional Approaches  Last 7 Days - Check all of the following nutritional approaches that were received within the last 7 days:  []  A. Parenteral/IV feeding  []  B. Feeding tube (e.g., nasogastric or abdominal (PEG))  []  C. Mechanically altered diet - requires change in texture of food or liquids (e.g., pureed food, thickened liquids)  []  D. Therapeutic diet (e.g., low salt, diabetic, low cholesterol)  [x]  Z. None of the above    At time of Discharge - Check all of the following nutritional approaches that were being received at discharge:  []  A. Parenteral/IV feeding (including IV fluids if needed for hydration, but not as part of dialysis/chemo)  []  B. Feeding tube (e.g., nasogastric or abdominal (PEG))  []  C. Mechanically altered diet - requires change in texture of food or liquids (e.g., pureed food, thickened liquids)  []  D. Therapeutic diet (e.g., low salt, diabetic, low cholesterol  [x]  Z. None of the above    High Risk Drug Classes:  Use and Indication    Is taking: Check if the pt is taking any medications by pharmacological classification, not how it is used, in the following classes  Indication noted:  If column 1 is checked, check if there is an indication noted for all meds in the drug class Is taking  (check all that apply) Indication noted (check all that apply)   Antipsychotic [x] [x]   Anticoagulant [x] [x]   Antibiotic [] []   Opioid [x] [x]   Antiplatelet [] []   Hypoglycemic (including insulin) [] []   None of the above []     Special Treatments, Procedures, and Programs    Check all of the following treatments, procedures, and programs that apply at discharge. At Discharge (check all that apply)   Cancer Treatments   A1. Chemotherapy []           A2. IV []           A3. Oral []           A10. Other []   B1. Radiation []   Respiratory Therapies   C1. Oxygen Therapy []           C2. Continuous (continuously for at least 14 hours per day) []           C3. Intermittent [x]           C4. High-concentration []   D1. Suctioning (Does not include oral suctioning) []           D2. Scheduled []           D3. As needed []   E1. Tracheostomy Care []   F1. Invasive Mechanical Ventilator (ventilator or respirator) []   G1. Non-invasive Mechanical Ventilator []           G2. BiPAP []           G3. CPAP []   Other   H1. IV Medications (Do not include sub Q pumps, flushes, Dextrose 50% or lactated ringers) []           H2. Vasoactive medications []           H3. Antibiotics []           H4. Anticoagulation []           H10. Other []   I1. Transfusions []   J1. Dialysis []           J2. Hemodialysis []           J3. Peritoneal dialysis []   O1. IV access (including a catheter in a vein) []           O2. Peripheral []           O3. Midline []           O4.  Central (PICC, tunneled, port) []      None of the above (select if no Cancer, Respiratory, or Other boxes are checked) []      Alec Price BSN, RN   [phone]

## 2023-01-26 NOTE — DISCHARGE SUMMARY
Physical Medicine & Rehabilitation  Discharge Summary     Patient Identification:  Beatris Olszewski  : 1935  Admit date: 2023  Discharge date: 23  Attending provider: Esperanza Gee MD        Primary care provider: Jenn Weber DO     Discharge Diagnoses:   Patient Active Problem List   Diagnosis    Benign prostatic hyperplasia without urinary obstruction    Mixed hyperlipidemia    Bradycardia    Primary hypertension    Pre-diabetes    Primary osteoarthritis of right knee    Memory impairment    Nipple soreness    Skin lesion on examination    Late onset Alzheimer's dementia without behavioral disturbance ([address]    Vitamin D insufficiency    Nondisplaced fracture of greater trochanter of left femur, initial encounter for closed fracture ([address]    Unable to ambulate    Alzheimer's dementia ([address]    Imbalance    Ataxia    Closed fracture of left femur, sequela       Discharge Functional Status:      Physical therapy:  Supine to Sit: Independent  Sit to Supine: Independent      Sit to Stand: Independent  Chair/Bed to Chair Transfer: Independent  Car Transfer: Independent     Ambulation 10 ft: Supervision or touching assistance  Ambulation 50 ft: Supervision or touching assistance  Ambulation 150 ft: Supervision or touching assistance  Stairs - 1 Step: Supervision or touching assistance  Stairs - 4 Step: Supervision or touching assistance  Stairs - 12 Step: Supervision or touching assistance    Occupational therapy:  Eating: Setup or clean-up assistance  Oral Hygiene: Independent  Bathing: Supervision or touching assistance  Upper Body Dressing: Independent  Lower Body Dressing: Independent     Toilet Transfer: Independent  Toilet Hygiene: Independent    Speech therapy:    Pt continues to present with Moderate  and Severe  Cognitive-Linguistic Deficits . Difficulty/limitations persist with short term memory and functional carryover recalling what happened, where he is, and discharge plan. Continue to target functional short term memory and problem solving to increase level of independence at next level of care. Pt is to be discharged to an independent living facility, suspect pt will need increased supports/ assistance due to reduced short term memory, poor carryover, and agitation. Inpatient Rehabilitation Course:   Roel Sutton is a 81 y.o. male admitted to inpatient rehabilitation on 1/12/2023 s/p Closed fracture of left femur, sequela. The patient participated in an aggressive multidisciplinary inpatient rehabilitation program involving 3 hours of therapy per day, at least 5 days per week. He progressed slowly in therapy but was able to be discharged home with home health care. His medical rehab course was significant for below:    Left greater trochanter femur fracture: 50% WBAT in LLE. PT/OT. Pain control. Ortho followed. OP follow up. Pt incapable of following restrictions cognitively. Dementia: resumed namenda and aricept per home regimen. Seroquel 25 as needed while in house. Discussed with family on multiple occasions that the patient requires 24-hour assistance for safety due to severe cognitive deficits. HTN: resumed home lisinopril 10. Continue this regimen at discharge. Discharge Exam:  Patient Vitals for the past 24 hrs:   BP Temp Temp src Pulse Resp SpO2   01/26/23 0707 (!) 144/74 97.7 °F (36.5 °C) Oral 54 17 95 %   01/26/23 0641 -- -- -- -- 16 --     Gen: No distress, pleasant. Resting in bedside chair  HEENT: Normocephalic, atraumatic. CV: Regular rate and rhythm. No MRG   Resp: No respiratory distress. CTAB   Abd: Soft, nontender, nondistended  Ext: No edema. Neuro: Alert, oriented, appropriately interactive. Higher level cognitive deficits present    Roel Sutton was evaluated today and a DME order was entered for a wheeled walker because he requires this to successfully complete daily living tasks of personal cares and ambulating.   A wheeled walker is necessary due to the patient's unsteady gait, upper body weakness, and inability to  an ambulation device; and he can ambulate only by pushing a walker instead of a lesser assistive device such as a cane, crutch, or standard walker. The need for this equipment was discussed with the patient and he understands and is in agreement. Significant Diagnostics:   Lab Results   Component Value Date    CREATININE 0.8 01/23/2023    BUN 23 (H) 01/23/2023     01/23/2023    K 4.0 01/23/2023     01/23/2023    CO2 24 01/23/2023       Lab Results   Component Value Date    WBC 5.7 01/23/2023    HGB 10.7 (L) 01/23/2023    HCT 31.4 (L) 01/23/2023    MCV 91.4 01/23/2023     01/23/2023       Disposition:  Home in stable condition. Follow-up:  See after visit summary from hospitalization    Discharge Medications:     Medication List        CONTINUE taking these medications      donepezil 10 MG tablet  Commonly known as: ARICEPT  Notes to patient: Uses: treatment of confusion (dementia)  Side Effect: nausea, vomiting, diarrhea, dizziness, drowsiness     dorzolamide 2 % ophthalmic solution  Commonly known as: TRUSOPT  Notes to patient: Uses: to decrease eye pressure due to glaucoma  Side Effects: burning, stinging, redness, watery, and irritation of the eye     lisinopril 10 MG tablet  Commonly known as: PRINIVIL;ZESTRIL  Notes to patient: Uses: To treat high blood pressure  Side Effects: dizziness, lightheadedness,tiredness, headache, dry cough            STOP taking these medications      traMADol 50 MG tablet  Commonly known as: ULTRAM            ASK your doctor about these medications      memantine 5 MG tablet  Commonly known as: NAMENDA  Take 1 tablet by mouth 2 times daily              I spent over 35 minutes on this discharge encounter between counseling, coordination of care, and medication reconciliation.       To comply with [address] [facility] bylaw R.II.[id number]:  Discharge order placed in advance to facilitate patient's discharge needs. Yuri Stacy MD    * This document was created using dictation software. While all precautions were taken to ensure accuracy, errors may have occurred. Please disregard any typographical errors.

## 2023-01-26 NOTE — PROGRESS NOTES
Patient and family received discharge instructions along with a list of medications, why they take the particular medication and when it is due the next time. Pt's daughter to call Dr. Dylan Cooper regarding Maritza Drake on AVS due to not having medication and pt receiving while inpt. And possible Telephone visit to review medication prior to 2/17/2023 appt. Pt discharged with eye drops, [place] and all personal belongings. Patient and family verbalized understanding of discharge instructions. Patient left with family via wheel chair.      Talisha CRAIGN, RN   [phone]

## 2023-01-26 NOTE — PROGRESS NOTES
Spoke to daughter this AM. Updated her to patient status this shift. All questions and concerns addressed. Educated on dementia symptoms and management. She could benefit from reifrced teaching. She works as home health aide and has basic understanding. Daughter thanked us nurses for taking good care of her dad and will be here around 11am today for discharge.

## 2023-01-26 NOTE — PROGRESS NOTES
Shift assessment completed. See documentation. Patient agitated - ambulating in room without walker, verbally agressive to staff, difficult to redirect. Patient refused vital signs, lovenox injection and eye drops. He did eventually take all other scheduled medications and Seroquel. After taking medications and speaking with daughter patient calmed down. He was agreeable to taking tramadol for left hip pain 7/10. Assisted patient to bed. Placed ice to left hip. Patient was then able to fall asleep. Sitter remains with patient, camera in room and operating. Bed in lowest position with brakes locked and bed alarm set. Call light and bedside table within reach.

## 2023-01-26 NOTE — PLAN OF CARE
Problem: Discharge Planning  Goal: Discharge to home or other facility with appropriate resources  1/26/2023 0723 by Samanta Maldonado RN  Outcome: Completed  1/26/2023 0101 by Antionette Anthony RN  Outcome: Progressing     Problem: Pain  Goal: Verbalizes/displays adequate comfort level or baseline comfort level  1/26/2023 0723 by Samanta Maldonado RN  Outcome: Completed  1/26/2023 0101 by Antionette Anthony RN  Outcome: Progressing     Problem: Skin/Tissue Integrity  Goal: Absence of new skin breakdown  1/26/2023 0723 by Samanta Maldonado RN  Outcome: Completed  1/26/2023 0101 by Antionette Anthony RN  Outcome: Progressing     Problem: Safety - Adult  Goal: Free from fall injury  1/26/2023 0723 by Samanta Maldonado RN  Outcome: Completed  1/26/2023 0101 by Antionette Anthony RN  Outcome: Progressing     Problem: ABCDS Injury Assessment  Goal: Absence of physical injury  1/26/2023 0723 by Samanta Maldonado RN  Outcome: Completed  1/26/2023 0101 by Antionette Anthony RN  Outcome: Progressing     Pt requiring frequent reminders of plans for discharge today. Pt up in chair with sitter present and camera for safety due to impulsiveness and recent falls.      Samanta Maldonado BSN, RN   [phone]

## 2023-01-27 ENCOUNTER — HOSPITAL ENCOUNTER (EMERGENCY)
Age: 88
Discharge: PSYCHIATRIC OR SUBSTANCE USE FACILITY | End: 2023-01-28
Attending: EMERGENCY MEDICINE
Payer: MEDICARE

## 2023-01-27 ENCOUNTER — TELEPHONE (OUTPATIENT)
Dept: INTERNAL MEDICINE CLINIC | Age: 88
End: 2023-01-27

## 2023-01-27 ENCOUNTER — APPOINTMENT (OUTPATIENT)
Dept: CT IMAGING | Age: 88
End: 2023-01-27
Payer: MEDICARE

## 2023-01-27 ENCOUNTER — APPOINTMENT (OUTPATIENT)
Dept: GENERAL RADIOLOGY | Age: 88
End: 2023-01-27
Payer: MEDICARE

## 2023-01-27 DIAGNOSIS — F03.918 SENILE DEMENTIA WITH DEPRESSION WITH BEHAVIORAL DISTURBANCE: Primary | ICD-10-CM

## 2023-01-27 DIAGNOSIS — F03.93 SENILE DEMENTIA WITH DEPRESSION WITH BEHAVIORAL DISTURBANCE: Primary | ICD-10-CM

## 2023-01-27 LAB
A/G RATIO: 1.1 (ref 1.1–2.2)
ACETAMINOPHEN LEVEL: <5 UG/ML (ref 10–30)
ALBUMIN SERPL-MCNC: 3.5 G/DL (ref 3.4–5)
ALP BLD-CCNC: 101 U/L (ref 40–129)
ALT SERPL-CCNC: 38 U/L (ref 10–40)
ANION GAP SERPL CALCULATED.3IONS-SCNC: 13 MMOL/L (ref 3–16)
AST SERPL-CCNC: 16 U/L (ref 15–37)
BASOPHILS ABSOLUTE: 0.1 K/UL (ref 0–0.2)
BASOPHILS RELATIVE PERCENT: 0.8 %
BILIRUB SERPL-MCNC: 0.4 MG/DL (ref 0–1)
BUN BLDV-MCNC: 18 MG/DL (ref 7–20)
CALCIUM SERPL-MCNC: 8.9 MG/DL (ref 8.3–10.6)
CHLORIDE BLD-SCNC: 103 MMOL/L (ref 99–110)
CO2: 21 MMOL/L (ref 21–32)
CREAT SERPL-MCNC: 0.8 MG/DL (ref 0.8–1.3)
EOSINOPHILS ABSOLUTE: 0 K/UL (ref 0–0.6)
EOSINOPHILS RELATIVE PERCENT: 0.4 %
ETHANOL: NORMAL MG/DL (ref 0–0.08)
GFR SERPL CREATININE-BSD FRML MDRD: >60 ML/MIN/{1.73_M2}
GLUCOSE BLD-MCNC: 114 MG/DL (ref 70–99)
HCT VFR BLD CALC: 37 % (ref 40.5–52.5)
HEMOGLOBIN: 12 G/DL (ref 13.5–17.5)
LYMPHOCYTES ABSOLUTE: 1.2 K/UL (ref 1–5.1)
LYMPHOCYTES RELATIVE PERCENT: 17.4 %
MCH RBC QN AUTO: 30.8 PG (ref 26–34)
MCHC RBC AUTO-ENTMCNC: 32.5 G/DL (ref 31–36)
MCV RBC AUTO: 94.6 FL (ref 80–100)
MONOCYTES ABSOLUTE: 0.6 K/UL (ref 0–1.3)
MONOCYTES RELATIVE PERCENT: 8.7 %
NEUTROPHILS ABSOLUTE: 4.8 K/UL (ref 1.7–7.7)
NEUTROPHILS RELATIVE PERCENT: 72.7 %
PDW BLD-RTO: 12.8 % (ref 12.4–15.4)
PLATELET # BLD: 333 K/UL (ref 135–450)
PMV BLD AUTO: 7.7 FL (ref 5–10.5)
POTASSIUM REFLEX MAGNESIUM: 4.1 MMOL/L (ref 3.5–5.1)
RAPID INFLUENZA  B AGN: NEGATIVE
RAPID INFLUENZA A AGN: NEGATIVE
RBC # BLD: 3.91 M/UL (ref 4.2–5.9)
SALICYLATE, SERUM: <0.3 MG/DL (ref 15–30)
SARS-COV-2, NAAT: NOT DETECTED
SODIUM BLD-SCNC: 137 MMOL/L (ref 136–145)
TOTAL PROTEIN: 6.7 G/DL (ref 6.4–8.2)
WBC # BLD: 6.7 K/UL (ref 4–11)

## 2023-01-27 PROCEDURE — 96374 THER/PROPH/DIAG INJ IV PUSH: CPT

## 2023-01-27 PROCEDURE — 80143 DRUG ASSAY ACETAMINOPHEN: CPT

## 2023-01-27 PROCEDURE — 87635 SARS-COV-2 COVID-19 AMP PRB: CPT

## 2023-01-27 PROCEDURE — 93005 ELECTROCARDIOGRAM TRACING: CPT | Performed by: PHYSICIAN ASSISTANT

## 2023-01-27 PROCEDURE — 99285 EMERGENCY DEPT VISIT HI MDM: CPT

## 2023-01-27 PROCEDURE — 70450 CT HEAD/BRAIN W/O DYE: CPT

## 2023-01-27 PROCEDURE — 80179 DRUG ASSAY SALICYLATE: CPT

## 2023-01-27 PROCEDURE — 85025 COMPLETE CBC W/AUTO DIFF WBC: CPT

## 2023-01-27 PROCEDURE — 80053 COMPREHEN METABOLIC PANEL: CPT

## 2023-01-27 PROCEDURE — 87804 INFLUENZA ASSAY W/OPTIC: CPT

## 2023-01-27 PROCEDURE — 82077 ASSAY SPEC XCP UR&BREATH IA: CPT

## 2023-01-27 PROCEDURE — 71045 X-RAY EXAM CHEST 1 VIEW: CPT

## 2023-01-27 PROCEDURE — 6360000002 HC RX W HCPCS: Performed by: PHYSICIAN ASSISTANT

## 2023-01-27 PROCEDURE — 6370000000 HC RX 637 (ALT 250 FOR IP): Performed by: PHYSICIAN ASSISTANT

## 2023-01-27 PROCEDURE — 36415 COLL VENOUS BLD VENIPUNCTURE: CPT

## 2023-01-27 RX ORDER — QUETIAPINE FUMARATE 25 MG/1
25 TABLET, FILM COATED ORAL 2 TIMES DAILY PRN
Qty: 60 TABLET | Refills: 0 | Status: ON HOLD | OUTPATIENT
Start: 2023-01-27

## 2023-01-27 RX ORDER — QUETIAPINE FUMARATE 25 MG/1
25 TABLET, FILM COATED ORAL ONCE
Status: COMPLETED | OUTPATIENT
Start: 2023-01-27 | End: 2023-01-27

## 2023-01-27 RX ORDER — DONEPEZIL HYDROCHLORIDE 10 MG/1
TABLET, FILM COATED ORAL
Qty: 90 TABLET | Refills: 1 | Status: ON HOLD
Start: 2023-01-27 | End: 2023-02-07 | Stop reason: HOSPADM

## 2023-01-27 RX ORDER — LORAZEPAM 2 MG/ML
0.5 INJECTION INTRAMUSCULAR ONCE
Status: COMPLETED | OUTPATIENT
Start: 2023-01-27 | End: 2023-01-27

## 2023-01-27 RX ADMIN — QUETIAPINE FUMARATE 25 MG: 25 TABLET ORAL at 20:30

## 2023-01-27 RX ADMIN — LORAZEPAM 0.5 MG: 2 INJECTION INTRAMUSCULAR; INTRAVENOUS at 20:30

## 2023-01-27 ASSESSMENT — PAIN - FUNCTIONAL ASSESSMENT: PAIN_FUNCTIONAL_ASSESSMENT: NONE - DENIES PAIN

## 2023-01-27 NOTE — TELEPHONE ENCOUNTER
Care Transitions Initial Follow Up Call    Outreach made within 2 business days of discharge: Yes    Patient: Caroline Cornelius Patient : 1935   MRN: [id number]  Reason for Admission: There are no discharge diagnoses documented for the most recent discharge. Discharge Date: 23       Spoke with: Neha    Discharge department/facility: per telephone call  family was trying to get him moved into an independent living facility.          Scheduled appointment with PCP within 7-14 days    Follow Up  Future Appointments   Date Time Provider [place]   2023  3:00 PM DO Trinh Baez 588

## 2023-01-27 NOTE — TELEPHONE ENCOUNTER
Called and spoke to patient.  Would not talk to me today. Asked to be called next week.     Effie- can you please touch base with him on Monday? Thanks     Priti- I think he would benefit from care management. I would work with his daughter Daria. Thanks

## 2023-01-27 NOTE — TELEPHONE ENCOUNTER
Daughter states that he has dementia and is in the hospital and wants to go home but can't because he can't take care of himself. Daughter is really upset, the pt is really upset. She is asking if there's something Dr. Shepard can give him to calm him down. Please advise. [phone].

## 2023-01-27 NOTE — TELEPHONE ENCOUNTER
Called and spoke to Juancho Valdivia- patient was d/c'd from the hospital today. She took [facility] to [facility], asked to leave after 4 hours as he was not appropriate for IL. He is very agitated and will not calm down . Took to [facility] ED and was \"turned away as he is of sound mind\". Called 9-1-1 and they would not take him in as he is \"of sound mind\". In background on phone call, patient is very agitated, yelling. Called and spoke to [facility] ED. Cannot promise admission but would be agreeable to evaluating him. No SW available at this time but would have SW available tomorrow morning. Needs memory care. May need holger-psych for agitation management. Was given Seroquel 25 mg hs in hospital for agitation. Not sent home with script. Offer Seroquel 25 mg qd prn vs taking to ED tonight. While I was on call with patient, she placed me on hold x 10 minutes. I did call her back and left message that I would call back again later. Called back at 7 pm. Left message again. I did speak to Chanelle Bhagat CNP-ARNP who is on call this weekend and he indicated that a crisis worker had reached out to him and was on her way out to evaluate Morgan.

## 2023-01-28 ENCOUNTER — HOSPITAL ENCOUNTER (INPATIENT)
Age: 88
LOS: 10 days | Discharge: HOME | DRG: 057 | End: 2023-02-07
Attending: PSYCHIATRY & NEUROLOGY | Admitting: PSYCHIATRY & NEUROLOGY
Payer: MEDICARE

## 2023-01-28 VITALS
OXYGEN SATURATION: 97 % | RESPIRATION RATE: 22 BRPM | DIASTOLIC BLOOD PRESSURE: 54 MMHG | HEART RATE: 69 BPM | TEMPERATURE: 98 F | SYSTOLIC BLOOD PRESSURE: 108 MMHG

## 2023-01-28 PROBLEM — F03.918 DEMENTIA WITH BEHAVIORAL DISTURBANCE: Status: ACTIVE | Noted: 2023-01-28

## 2023-01-28 PROBLEM — S72.002S CLOSED LEFT HIP FRACTURE, SEQUELA: Status: ACTIVE | Noted: 2023-01-28

## 2023-01-28 LAB
AMPHETAMINE SCREEN, URINE: NORMAL
BACTERIA: ABNORMAL /HPF
BARBITURATE SCREEN URINE: NORMAL
BENZODIAZEPINE SCREEN, URINE: NORMAL
BILIRUBIN URINE: NEGATIVE
BLOOD, URINE: NEGATIVE
CANNABINOID SCREEN URINE: NORMAL
CLARITY: CLEAR
COCAINE METABOLITE SCREEN URINE: NORMAL
COLOR: YELLOW
COMMENT UA: ABNORMAL
EKG ATRIAL RATE: 67 BPM
EKG DIAGNOSIS: NORMAL
EKG P AXIS: 50 DEGREES
EKG P-R INTERVAL: 186 MS
EKG Q-T INTERVAL: 410 MS
EKG QRS DURATION: 108 MS
EKG QTC CALCULATION (BAZETT): 433 MS
EKG R AXIS: -5 DEGREES
EKG T AXIS: 12 DEGREES
EKG VENTRICULAR RATE: 67 BPM
EPITHELIAL CELLS, UA: 1 /HPF (ref 0–5)
FENTANYL SCREEN, URINE: NORMAL
GLUCOSE URINE: NEGATIVE MG/DL
HYALINE CASTS: 0 /LPF (ref 0–8)
KETONES, URINE: ABNORMAL MG/DL
LEUKOCYTE ESTERASE, URINE: NEGATIVE
Lab: NORMAL
METHADONE SCREEN, URINE: NORMAL
MICROSCOPIC EXAMINATION: YES
NITRITE, URINE: NEGATIVE
OPIATE SCREEN URINE: NORMAL
OXYCODONE URINE: NORMAL
PH UA: 6.5
PH UA: 6.5 (ref 5–8)
PHENCYCLIDINE SCREEN URINE: NORMAL
PROTEIN UA: ABNORMAL MG/DL
RBC UA: ABNORMAL /HPF (ref 0–4)
SPECIFIC GRAVITY UA: 1.02 (ref 1–1.03)
TSH SERPL DL<=0.05 MIU/L-ACNC: 3.3 UIU/ML (ref 0.27–4.2)
URINE REFLEX TO CULTURE: ABNORMAL
URINE TYPE: ABNORMAL
UROBILINOGEN, URINE: 1 E.U./DL
WBC UA: 5 /HPF (ref 0–5)
YEAST: PRESENT /HPF

## 2023-01-28 PROCEDURE — 97162 PT EVAL MOD COMPLEX 30 MIN: CPT

## 2023-01-28 PROCEDURE — 80061 LIPID PANEL: CPT

## 2023-01-28 PROCEDURE — 6370000000 HC RX 637 (ALT 250 FOR IP)

## 2023-01-28 PROCEDURE — 97166 OT EVAL MOD COMPLEX 45 MIN: CPT

## 2023-01-28 PROCEDURE — 97530 THERAPEUTIC ACTIVITIES: CPT

## 2023-01-28 PROCEDURE — 93010 ELECTROCARDIOGRAM REPORT: CPT | Performed by: INTERNAL MEDICINE

## 2023-01-28 PROCEDURE — 97116 GAIT TRAINING THERAPY: CPT

## 2023-01-28 PROCEDURE — 80307 DRUG TEST PRSMV CHEM ANLYZR: CPT

## 2023-01-28 PROCEDURE — 99221 1ST HOSP IP/OBS SF/LOW 40: CPT | Performed by: NURSE PRACTITIONER

## 2023-01-28 PROCEDURE — 99223 1ST HOSP IP/OBS HIGH 75: CPT

## 2023-01-28 PROCEDURE — 83036 HEMOGLOBIN GLYCOSYLATED A1C: CPT

## 2023-01-28 PROCEDURE — 36415 COLL VENOUS BLD VENIPUNCTURE: CPT

## 2023-01-28 PROCEDURE — 81001 URINALYSIS AUTO W/SCOPE: CPT

## 2023-01-28 PROCEDURE — 84443 ASSAY THYROID STIM HORMONE: CPT

## 2023-01-28 PROCEDURE — 1240000000 HC EMOTIONAL WELLNESS R&B

## 2023-01-28 RX ORDER — DIPHENHYDRAMINE HYDROCHLORIDE 50 MG/ML
50 INJECTION INTRAMUSCULAR; INTRAVENOUS EVERY 4 HOURS PRN
Status: DISCONTINUED | OUTPATIENT
Start: 2023-01-28 | End: 2023-02-07 | Stop reason: HOSPADM

## 2023-01-28 RX ORDER — ACETAMINOPHEN 325 MG/1
650 TABLET ORAL EVERY 4 HOURS PRN
Status: DISCONTINUED | OUTPATIENT
Start: 2023-01-28 | End: 2023-01-30

## 2023-01-28 RX ORDER — MECOBALAMIN 5000 MCG
5 TABLET,DISINTEGRATING ORAL EVERY EVENING
Status: DISCONTINUED | OUTPATIENT
Start: 2023-01-28 | End: 2023-02-02

## 2023-01-28 RX ORDER — DONEPEZIL HYDROCHLORIDE 5 MG/1
5 TABLET, FILM COATED ORAL NIGHTLY
Status: DISCONTINUED | OUTPATIENT
Start: 2023-01-28 | End: 2023-02-07 | Stop reason: HOSPADM

## 2023-01-28 RX ORDER — HYDROXYZINE 50 MG/1
50 TABLET, FILM COATED ORAL 3 TIMES DAILY PRN
Status: DISCONTINUED | OUTPATIENT
Start: 2023-01-28 | End: 2023-02-07 | Stop reason: HOSPADM

## 2023-01-28 RX ORDER — OLANZAPINE 5 MG/1
5 TABLET ORAL EVERY 4 HOURS PRN
Status: DISCONTINUED | OUTPATIENT
Start: 2023-01-28 | End: 2023-02-07 | Stop reason: HOSPADM

## 2023-01-28 RX ORDER — QUETIAPINE FUMARATE 25 MG/1
25 TABLET, FILM COATED ORAL 2 TIMES DAILY PRN
Status: DISCONTINUED | OUTPATIENT
Start: 2023-01-28 | End: 2023-01-28

## 2023-01-28 RX ORDER — POLYETHYLENE GLYCOL 3350 17 G
2 POWDER IN PACKET (EA) ORAL
Status: DISCONTINUED | OUTPATIENT
Start: 2023-01-28 | End: 2023-02-07 | Stop reason: HOSPADM

## 2023-01-28 RX ORDER — QUETIAPINE FUMARATE 25 MG/1
25 TABLET, FILM COATED ORAL 2 TIMES DAILY
Status: DISCONTINUED | OUTPATIENT
Start: 2023-01-28 | End: 2023-01-30

## 2023-01-28 RX ORDER — MAGNESIUM HYDROXIDE/ALUMINUM HYDROXICE/SIMETHICONE 120; 1200; 1200 MG/30ML; MG/30ML; MG/30ML
30 SUSPENSION ORAL EVERY 6 HOURS PRN
Status: DISCONTINUED | OUTPATIENT
Start: 2023-01-28 | End: 2023-02-07 | Stop reason: HOSPADM

## 2023-01-28 RX ORDER — TRAZODONE HYDROCHLORIDE 50 MG/1
50 TABLET ORAL NIGHTLY PRN
Status: DISCONTINUED | OUTPATIENT
Start: 2023-01-28 | End: 2023-02-07 | Stop reason: HOSPADM

## 2023-01-28 RX ORDER — MEMANTINE HYDROCHLORIDE 5 MG/1
5 TABLET ORAL DAILY
Status: DISCONTINUED | OUTPATIENT
Start: 2023-01-28 | End: 2023-02-07 | Stop reason: HOSPADM

## 2023-01-28 RX ADMIN — QUETIAPINE FUMARATE 25 MG: 25 TABLET ORAL at 20:36

## 2023-01-28 RX ADMIN — Medication 5 MG: at 19:03

## 2023-01-28 RX ADMIN — DONEPEZIL HYDROCHLORIDE 5 MG: 5 TABLET, FILM COATED ORAL at 20:36

## 2023-01-28 RX ADMIN — ACETAMINOPHEN 650 MG: 325 TABLET ORAL at 12:46

## 2023-01-28 RX ADMIN — QUETIAPINE FUMARATE 25 MG: 25 TABLET ORAL at 12:14

## 2023-01-28 RX ADMIN — MEMANTINE 5 MG: 5 TABLET ORAL at 12:14

## 2023-01-28 ASSESSMENT — PAIN SCALES - GENERAL
PAINLEVEL_OUTOF10: 0
PAINLEVEL_OUTOF10: 0
PAINLEVEL_OUTOF10: 3

## 2023-01-28 ASSESSMENT — PAIN SCALES - WONG BAKER
WONGBAKER_NUMERICALRESPONSE: 0
WONGBAKER_NUMERICALRESPONSE: 0

## 2023-01-28 ASSESSMENT — PAIN DESCRIPTION - DESCRIPTORS: DESCRIPTORS: ACHING

## 2023-01-28 ASSESSMENT — SLEEP AND FATIGUE QUESTIONNAIRES
DO YOU USE A SLEEP AID: NO
DO YOU HAVE DIFFICULTY SLEEPING: YES
AVERAGE NUMBER OF SLEEP HOURS: 8
SLEEP PATTERN: RESTLESSNESS

## 2023-01-28 ASSESSMENT — ENCOUNTER SYMPTOMS
VOMITING: 0
COUGH: 0
SHORTNESS OF BREATH: 0
PHOTOPHOBIA: 0
SINUS PRESSURE: 0
RESPIRATORY NEGATIVE: 1
BACK PAIN: 0
CHEST TIGHTNESS: 0
DIARRHEA: 0
RHINORRHEA: 0
CONSTIPATION: 0
COLOR CHANGE: 0
ABDOMINAL PAIN: 0
SORE THROAT: 0
VOICE CHANGE: 0
SINUS PAIN: 0
NAUSEA: 0
TROUBLE SWALLOWING: 0

## 2023-01-28 ASSESSMENT — PAIN DESCRIPTION - ORIENTATION: ORIENTATION: LEFT

## 2023-01-28 ASSESSMENT — PAIN DESCRIPTION - LOCATION: LOCATION: HIP

## 2023-01-28 ASSESSMENT — LIFESTYLE VARIABLES
HOW MANY STANDARD DRINKS CONTAINING ALCOHOL DO YOU HAVE ON A TYPICAL DAY: PATIENT DECLINED
HOW OFTEN DO YOU HAVE A DRINK CONTAINING ALCOHOL: PATIENT DECLINED

## 2023-01-28 NOTE — PROGRESS NOTES
Pt is alert and oriented to self and \"hospital\". Very confused, severe short term memory impairment. Pt was initially very irritable and angry this shift. Pt preoccupied w/ asking why he is here and wanting to go home. Pt is unable to understand why he is here due to cognitive impairment, therefore he becomes angry easily when told it is unsafe for him to go home at this time. Pt verbally aggressive toward staff, banging on tables/doors a few times, but no physical aggression noted throughout this shift. Pt eventually was able to settle in and calm down once redirected and reassured many times throughout the day. Pt given scheduled meds whole w/o difficulty. These were effective in helping lower his anxiety and irritability. Pt is up w/ walker and SBA. Steady gait, following 50% weight bearing recommendation by using walker appropriately. Pt only c/o mild pain in left hip. Given tylenol @ [phone] 96 for 3/10 pain. This was effective. Good appetite and fluid intake. Denies SI/HI/AVH, no RTIS noted. Will continue to monitor.

## 2023-01-28 NOTE — PROGRESS NOTES
Occupational Therapy  Inpatient Occupational Therapy  Evaluation and Treatment    Unit:   [facility]  Date:  1/28/2023  Patient Name:    Néstor Bryan  Admitting diagnosis:  Dementia with behavioral disturbance [F03.918]  Admit Date:  1/28/2023  Precautions/Restrictions/WB Status/ Lines/ Wounds/ Oxygen:  Standard BHI Precautions  WB restrictions (50%)      Treatment Number:  1    Treatment Time: 0871-2969  Timed Code Treatment Minutes:    34 minutes   Total Treatment Time:    44  minutes    Patient Goals for Therapy:  None stated        Discharge Recommendations: Home with 24/7 assist and home therapy  DME needs for discharge: Shower Chair       Therapy recommendations for staff:   Assist of 1 with use of rolling walker (RW) and gait belt for all transfers to/from bathroom  within room  within community room  to/from chair    History of Present Illness:  Per H&P  \"The patient is a 81 y.o. male with Dementia, recent hip fracture, hypertension, and hyperlipidemia who presented to [facility] ED with complaint of with increased confusion and combative behavior. Patient was seen and evaluated in the ED by the ED medical provider, patient was medically cleared for admission to [facility] at [facility]. This note serves as an admission medical H&P. Patient was at [facility] 1/11-1/13. Went to ARU there 1/13-1/26/23. Then went to live with his daughter. \"    Ortho evaluated and suggested 50% weightbearing on the left lower extremity. No abd of LLE. AM-PAC Score: AM-PAC Inpatient Daily Activity Raw Score: 19   Home Health S4 Level: NA      Pre-Admission Information Information gathered from previous OT evaluation on 1/12/23    Lives With: alone                     Type of Home: house  Home Layout: two level, basement with full flight  Home Access:  1 step to enter with handrail. Handrails are located on R side. Bathroom Layout: tub/shower unit, walk in shower--walk-in shower in basement; tub shower on main level. Pt typically uses walk-in shower downstairs. Bathroom Equipment:  no prior equipment  Toilet Height: standard height  Home Equipment: no prior equipment  Transfer Assistance: Independent without use of device  Ambulation Assistance:Independent without use of device  ADL Assistance: independent with all ADL's  IADL Assistance: independent with homemaking tasks--son fills medications; pt able to take them independently every morning. Active :        [x] Yes                 [] No  Hand Dominance: [] Left                 [x] Right  Current Employment: retired. Occupation: real estate  Hobbies: golf with son every weekend  Recent Falls: no recent falls. Note: Social functional information acquired via phone call with son while in pt room. Daughter Fab Nicole in room at [address] [place] [address] and able to confirm. Pt has baseline dementia. Daughter states, Flaquita Dose is functional but forgetful\". She indicates his confusion has been exacerbated since his admission due to unfamiliar setting and events of hospital course. Pain   No  Rating:NA  Location: NA  Pain Medicine Status: No request made      Cognition    A&O to orientation not directly assessed. Able to follow:  1 step commands inconsistently  Poor carryover. Upper Extremity ROM:    WFL, pt able to perform all bed mobility, transfers, and gait without ROM limitation. Upper Extremity Strength:    BUE strength WFL, but not formally assessed w/ MMT      Upper Extremity Sensation    NT    Upper Extremity Proprioception:  WFL    Coordination and Tone  WFL    Balance  Static Sitting:  Good   Dynamic Sitting: Not tested  Static Standing:  Good   Dynamic Standing: Not tested    Bed mobility:    Supine to sit:   SBA- HOB elevated. Sit to supine:   SBA- HOB elevated.  pt did a good job maintaining precautions (no ABD LLE)  Scooting to head of bed: Not Tested   Scooting in sitting:  SBA  Rolling:   Not Tested  Bridging:   Not Tested    Transfers:    Sit to stand:    SBA       CGA from toilet. BSC placed over toilet following examination to provided pt with bilateral UE support during sit>stand. Stand to sit:    SBA  Community room to/from toilet: SBA- with RW. Dressing:      UE:   Not Tested  LE:    Not Tested    Bathing:    UE:  Not Tested  LE:  Not Tested    Eating:   Independent- observed eating a snack prior to evaluation. Toileting:  Not Tested    Grooming/hygiene: Not Tested    Exercise / Activities Initiated:   Pt. Educated on role of OT. Pt. Participated in:  Eval,     Assessment of Deficits:   Pt demonstrated decreased activity tolerance, decreased safety awareness, decreased strength, decreased ROM, decreased balance,  decreased bed mobility, decreased transfer skills, and decreased ADL/IADL status. Pt. Limited during evaluation by . . . At end of evaluation, pt. In room/gathering room/group. Goal(s) : To be met in 3 Visits:  1). Bed to toilet/BSC:  Independent     To be met in 5 Visits:  1). Supine to/from Sit:             Independent   2). Upper Body Bathing:         Independent   3). Lower Body Bathing:         Independent   4). Upper Body Dressing:       Independent   5). Lower Body Dressing:       Independent   6). Pt to demonstrate UE exs x 15 reps with minimal cues    Rehabilitation Potential:  Good for goals listed above. Strengths for achieving goals include:  PLOF  Barriers to achieving goals include: Decreased cognition    Plan: To be seen 3-5x/week while in acute care setting for therapeutic exercises/act, ADL retraining, NMR and patient/caregiver ed/instruction. Signature and License Number  [employer].  Mitra Kaur OTR/L #[id number]      If patient discharges from this facility prior to next visit, this note will serve as the Discharge Summary

## 2023-01-28 NOTE — BH NOTE
[facility]  Admission Note     Admission Type:   Admission Type: Involuntary    Reason for admission:  Reason for Admission: Pt exhibiting increased confusion and agitation since experiencing hip fracture on 1/11/23. Addictive Behavior:   Addictive Behavior  In the Past 3 Months, Have You Felt or Has Someone Told You That You Have a Problem With  :  (FER due to current mental status)    Medical Problems:   Past Medical History:   Diagnosis Date    Benign prostatic hypertrophy without urinary obstruction     Bradycardia 01/15/2013    COPD (chronic obstructive pulmonary disease) ([facility])     Dementia (HCC)     Glaucoma     Gout     Hyperlipidemia     Hypertension     Hypoxemia     Macular degeneration disease     Osteoarthritis     Sleep apnea     Unspecified sleep apnea        Status EXAM:  Mental Status and Behavioral Exam  Normal: No  Level of Assistance: Independent/Self  Facial Expression: Worried, Hostile  Affect: Unstable  Level of Consciousness: Confused  Frequency of Checks: 4 times per hour, close  Mood:Normal: No  Mood: Anxious, Labile, Suspicious, Angry  Motor Activity:Normal: Yes  Eye Contact: Fair  Observed Behavior: Preoccupied, Guarded, Exit seeking, Impulsive  Sexual Misconduct History: Current - no  Preception: [place] to person, [place] to place (Oriented to hospital)  Attention:Normal: No  Attention: Unable to concentrate  Thought Processes: Perseveration  Thought Content:Normal: No  Thought Content: Poverty of content, Preoccupations  Depression Symptoms: Increased irritability, Impaired concentration  Anxiety Symptoms: Generalized  [place] Symptoms: No problems reported or observed.   Hallucinations: Unable to assess (FER d/t cognitive impairment)  Delusions: No  Memory:Normal: No  Memory: Confabulation, Poor recent, Poor remote  Insight and Judgment: No  Insight and Judgment: Poor judgment, Poor insight    Tobacco Screening:  Practical Counseling, on admission, christi X, if applicable and completed (first 3 are required if patient doesn't refuse):            (X) Recognizing danger situations (included triggers and roadblocks)                    (X) Coping skills (new ways to manage stress,relaxation techniques, changing routine, distraction)                                                           ( ) Basic information about quitting (benefits of quitting, techniques in how to quit, available resources  ( ) Referral for counseling faxed to Rachel                                                                                                                   ( ) Patient refused counseling  (X) Patient has not smoked in the last 30 days    Metabolic Screening:    Lab Results   Component Value Date    LABA1C 5.5 02/15/2022       Lab Results   Component Value Date    CHOL 173 02/15/2022    CHOL 147 12/02/2019    CHOL 164 11/02/2018    CHOL 173 11/27/2017    CHOL 183 09/28/2016    CHOL 178 10/20/2015    CHOL 164 08/27/2014    CHOL 173 01/15/2013    CHOL 177 02/10/2012    CHOL 169 05/03/2010     Lab Results   Component Value Date    TRIG 82 02/15/2022    TRIG 96 12/02/2019    TRIG 50 11/02/2018    TRIG 63 11/27/2017    TRIG 61 09/28/2016    TRIG 80 10/20/2015    TRIG 101 08/27/2014    TRIG 44 01/15/2013    TRIG 56 02/10/2012    TRIG 80 05/03/2010     Lab Results   Component Value Date    HDL 59 02/15/2022    HDL 57 12/02/2019    HDL 57 11/02/2018    HDL 63 (H) 11/27/2017    HDL 63 (H) 09/28/2016    HDL 60 10/20/2015    HDL 50 08/27/2014    HDL 61 (H) 01/15/2013    HDL 54 02/10/2012    HDL 46 05/03/2010     No components found for: LDLCAL  Lab Results   Component Value Date    LABVLDL 16 02/15/2022    LABVLDL 19 12/02/2019    LABVLDL 10 11/02/2018    LABVLDL 13 11/27/2017    LABVLDL 12 09/28/2016    LABVLDL 16 10/20/2015    LABVLDL 20 08/27/2014    LABVLDL 9 01/15/2013    LABVLDL 11 02/10/2012    LABVLDL 16 05/03/2010         Body mass index is 28.94 kg/m².     BP Readings from Last 2 Encounters:   01/28/23 (!) 143/65   01/28/23 (!) 108/54       Pt admitted with followings belongings:  Dental Appliances: None  Vision - Corrective Lenses:  At home, Eyeglasses  Hearing Aid: None  Jewelry: Watch (2x watches on pt's wrist)  Body Piercings Removed: N/A  Clothing: Socks, Shirt, Pants, Jacket/Coat, Footwear (Long sleeve shirt, white t shirt, maroon sweats, loafers, socks)  Other Valuables: Wallet, Money, Other (Comment) ($79.00 cash in wallet, 's license and ss card, checkbook - in safe)    Bridger Bermeo RN

## 2023-01-28 NOTE — ED TRIAGE NOTES
Patient in by [facility] EMS from home. Per EMS daughter and mobile crisis unit were on scene. EMS stated daughter and patient were yelling at each other when they arrived so daughter called mobile crisis and 911 to help deescalate patient. Patient has severe dementia and is hyper fixated on \"getting my car back. EMS stated that patient was supposed to get a pill for his mood per monica but script was not called in. Patient I don't [place] be here and I Tarajessie Giordano go home in my car\". Patient keeps stating he doesn't wanna be here and wants to get home. Patient concerned about how he is going to get home. Mobile crisis at bedside with patient.

## 2023-01-28 NOTE — ED NOTES
Mobile crisis worker signed hold on patient, mobile crisis no longer at bedside.       Lisa Zurita, RN  01/27/23 2040

## 2023-01-28 NOTE — ED NOTES
Report called in to [facility] @ [facility].       Joe Pablo RN  01/28/23 [id number]

## 2023-01-28 NOTE — PROGRESS NOTES
Inpatient Geriatric  Physical Therapy Evaluation and Treatment    Unit:  [facility]  Date:  1/28/2023  Patient Name:    Janis Gregg  Admitting diagnosis:  Dementia with behavioral disturbance [F03.918]  Admit Date:  1/28/2023  Precautions/Restrictions/Medical Indications    Standard BHI Precautions  Fall Risk  confusion  WB restrictions (50% LLE, no abduction)  History of Present Illness:  Per H&P  \"The patient is a 81 y.o. male with Dementia, recent hip fracture, hypertension, and hyperlipidemia who presented to [facility] ED with complaint of with increased confusion and combative behavior. Patient was seen and evaluated in the ED by the ED medical provider, patient was medically cleared for admission to [facility] at [facility]. This note serves as an admission medical H&P. Patient was at [facility] 1/11-1/13. Went to ARU there 1/13-1/26/23. Then went to live with his daughter. \"     Ortho evaluated and suggested 50% weightbearing on the left lower extremity. No abd of LLE. Past Medical History:  has a past medical history of Benign prostatic hypertrophy without urinary obstruction, Bradycardia, COPD (chronic obstructive pulmonary disease) ([facility] Dementia ([facility] Glaucoma, Gout, Hyperlipidemia, Hypertension, Hypoxemia, Macular degeneration disease, Osteoarthritis, Sleep apnea, and Unspecified sleep apnea. Scheduled for ortho f/u ~1 month  Treatment Time:  15:35-16:10  Treatment Number:  1         Discharge Recommendations from PHYSICAL perspective:                                          24/7 assist and continued PT    DME needs for discharge:     Shower Chair     Therapy recommendations for staff:   Ax1 with RW /gait belt for transfers and ambulation within the unit 50% weight bearing LLE-use RW at all times.   No abduction of the LLE  Recommended bed alarm   Recommended patient sit in chair with arms    Reviewed with RN      Therapy recommendation for EMS Transport: can transport by wheelchair     Home Health S4 Level: Level 1- Standard        AM-PAC Mobility Score     AM-PAC Inpatient Mobility Raw Score : 23        Pre-Admission Information Information gathered from previous OT evaluation on 1/12/23     Lives With: alone                     Type of Home: house  Home Layout: two level, basement with full flight  Home Access:  1 step to enter with handrail. Handrails are located on R side. Bathroom Layout: tub/shower unit, walk in shower--walk-in shower in basement; tub shower on main level. Pt typically uses walk-in shower downstairs. Bathroom Equipment:  no prior equipment  Toilet Height: standard height  Home Equipment: RW  Transfer Assistance: Independent without use of device  Ambulation Assistance:Independent without use of device  ADL Assistance: independent with all ADL's  IADL Assistance: independent with homemaking tasks--son fills medications; pt able to take them independently every morning. Active :        [x] Yes                 [] No  Hand Dominance: [] Left                 [x] Right  Current Employment: retired. Occupation: real estate  Hobbies: golf with son every weekend  Recent Falls: fall that led to current admission    Note: Social functional information acquired via phone call with son while in pt room. Daughter Kenneth Kidney in room at [address] [place] [address] and able to confirm. Pt has baseline dementia. Daughter states, Phil Babin is functional but forgetful\". She indicates his confusion has been exacerbated since his admission due to unfamiliar setting and events of hospital course. Subjective:   Pt agreeable to evaluation and treatment as needed. Patient sitting in common area, observed standing and walking with RW    Pain   none reported      Cognition    A&O to orientation not directly assessed.    Able to follow:  1 step commands inconsistently  Patient asking several questions repeatedly during session, unable to recall answer ( where is my wallet,when did I get here, when do I get to leave)  Patient was pleasant and cooperative    Upper Extremity ROM/Strength  Deferred to OT evaluation: please see OT note    Lower Extremity ROM / Strength (use of 5/5 scale if strength formally assessed)    AROM: WFL    [facility] and ambulation      Trunk Control   Good    Vision:   WFL  Comments:     Hearing:   WFL  Comments:     Balance  Static Sitting:  Good   Dynamic Sitting: Good   Static Standing:  Good   Dynamic Standing: Good       Bed mobility    Rolling to left:    Not Tested  Rolling to right:   Not Tested  Scooting in supine:   SBA  Scooting in sitting:   SBA  Supine to sit with HOB elevated:  SBA, from the right, no abduction  Sit to supine with HOB elevated:  SBApivoted BLE's independently,no abduction noted    Transfers    Sit to stand:  SBA; GA from low toilet with only 1 UE support; BSC placed over toilet to provided BUE support  Stand to sit:  SBA  Community room to toilet:  SBA with RW    Toileting  Sit>stand from toilet (standard height): CGA from low toilet with 1 UE support, OT provided BSC frame to provide BUE support  Stand>sit to toilet (standard height):   CGA  Dominga-care:      N/A  Hand hygiene:     Not Tested  LE pants management:   Not Tested  Cues:    Gait gait completed as indicated below  Deviations (firm surface/linoleum):  forward flexed posture, step through pattern, and moves quickly  Level of assist:    CGA  Assistive Device Used:    gait belt and rolling walker (RW)  Distance:  300 ft  Cued on:     Stair Training deferred, pt unsafe/ not appropriate to complete stairs at this time and no step available at this time      Activity Tolerance   No adverse symptoms noted w/activity    Positioning Needs   Patient up in chair in common area. Nursing staff is not currently using an alarm. Therapeutic Exercises Initiated    Exercises in BOLD performed in unit today.   Items not bolded are carried forward from prior visits for continuity of the record. Exercise/Equipment Resistance/Repetitions Other comments                                        Patient/Family Education   Educated on importance of continued activity   Educated on safety with transfers  Educated on proper gait pattern and RW distance    Patients response to evaluation/treatment:   Pt tolerated treatment well    Impairments/ Deficits  Decreased functional status below baseline    Assessment of Deficits  Pt is 81year old male presenting within the [facility] at [facility] with medical diagnosis of Dementia with behavioral disturbance [F03.918]. Presents today with poor safety awareness and gait abnormality . Would benefit from continued IP PT to address restore function. Patient will benefit from skilled PT to maximize independence. Appears to be moving well and not limited by pain at this time    Recommending 24/7 assist and continued PT at discharge          Goals :   Patient Goals for Therapy: none stated     To be met in 3 visits:  1). Demonstrate improved safety awareness with functional mobility requiring less overall cueing. To be met in 6 visits:  1). Supine to/from sit  Independent to promote return to functional ADLs  2). Sit to/from stand Modified Independent to promote return to functional ADLs  3). Gait: Ambulate Sridhar's with Modified Independent and use of rolling walker (RW) demonstrating improved gait pattern  4). Tolerate B LE exercises 3 sets of 10-15 reps to improve strength   5).   Tolerated standing balance exercises with improved balance to Good  grade    Rehabilitation Potential    Good  Strengths for achieving goals include:   PLOF, Family Support, and Pt cooperative  Barriers to achieving goals include:    Cognitive deficits      Plan    To be seen 2-5x/week while in [place] setting for   Therapeutic Exercise, Gait training, and Therapeutic Activity     Timed Code Treatment Minutes:  25 minutes  Total Treatment Time:   35 david Cruz, PT #[id number]       If patient discharges from this facility prior to next visit, this note will serve as the Discharge Summary.

## 2023-01-28 NOTE — BH NOTE
4 Eyes Skin Assessment     The patient is being assessed for  Admission    I agree that 2 RN's have performed a thorough Head to Toe Skin Assessment on the patient. ALL assessment sites listed below have been assessed. Areas assessed for pressure by both nurses:   [x]   Head, Face, and Ears   [x]   Shoulders, Back, and Chest  [x]   Arms, Elbows, and Hands   []   Coccyx, Sacrum, and Ischium - refused assessment  [x]   Legs, Feet, and Heels                                Skin Assessed Under all Medical Devices by both nurses:  N/A               All Mepilex Borders were peeled back and area peeked at by both nurses:  No: N/A  Please list where Mepilex Borders are located:  N/A                 Does the Patient have Skin Breakdown related to pressure?   No     (Insert Photo here N/A)         Benjamín Prevention initiated:  NA   Wound Care Orders initiated:  NA      [facility] nurse consulted for Pressure Injury (Stage 3,4, Unstageable, DTI, NWPT, Complex wounds)and New or Established Ostomies:  NA        Nurse 1 eSignature: Electronically signed by Mayra Lee RN on 1/28/23 at 3:49 PM EST    **SHARE this note so that the co-signing nurse is able to place an eSignature**    Nurse 2 eSignature: Electronically signed by Bisi Cason RN on 1/28/23 at 5:23 PM EST
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
## 2023-01-28 NOTE — ED NOTES
Pt in bed resting. SI precautions continued, sitter at bedside.       Josh Polk, KAYLEY  01/28/23 4662

## 2023-01-28 NOTE — ED NOTES
Patient resting in bed with room door open. No acute distress noted.       Louise Ricardo RN  01/27/23 3325
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
## 2023-01-28 NOTE — ED NOTES
Security called to collect patient belongings. Patient had shirt, jacket, pants and shoes.       Tricia Lisa RN  01/28/23 5446
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
## 2023-01-28 NOTE — ED NOTES
A safety risk assessment of the patient environment was conducted and the room was modified for safety. The room is free of all removed equipment, medical supplies, and articles that may pose a threat to the patient or others such as sharp items and needle boxes. Items were removed from unlocked drawers, cabinets are locked when locks are available, extra linens, medical cords, cables, pictures from wall, ect. Are all removed. The patient call light was left in place due to the medical nature of the unit ad the needs of the patient. The patient was place in a room close to the nursing desk. The patient was placed in a hospital gown. A search of the patient and patient environment was performed. Two staff members (including ) conducted a witnessed search of all belongings in the presence of the patient. All personal items including sharp objects, belts, ties, and ingestibles were removed and secured. The patient and family were educated regarding items brought in by family members and visitors will be searched to verify that no potentially dangerous items are brought in to the patient. If a visitor refuses search of items- visitor will be instructed that the items cannot enter patient room. Patient  at bedside. Bed locked and in lowest position with both side rails raised. Call light within reach. Will monitor pt. hourly.           Aylin López RN  01/28/23 9301

## 2023-01-28 NOTE — ED NOTES
Mo, RN to assume patient care. Report given, verbalized understanding and denies any questions.       Marii Killian RN  01/28/23 5845

## 2023-01-28 NOTE — H&P
Hospital Medicine History & Physical      PCP: Jefferson Kumari DO    Date of Admission: 1/28/2023    Date of Service: Pt seen/examined on 1/28/2023     Chief Complaint:  increased confusion, combative behavior      History Of Present Illness: The patient is a 81 y.o. male with Dementia, recent hip fracture, hypertension, and hyperlipidemia who presented to [facility] ED with complaint of with increased confusion and combative behavior. Patient was seen and evaluated in the ED by the ED medical provider, patient was medically cleared for admission to [facility]. This note serves as an admission medical H&P. Patient was at [facility] 1/11-1/13. Went to ARU there 1/13-1/26/23. Then went to live with his daughter. PCP: Jefferson Kumari DO    Patient denies any symptoms/complaints. Past Medical History:        Diagnosis Date    Benign prostatic hypertrophy without urinary obstruction     Bradycardia 01/15/2013    COPD (chronic obstructive pulmonary disease) (HCC)     Dementia (HCC)     Glaucoma     Gout     Hyperlipidemia     Hypertension     Hypoxemia     Macular degeneration disease     Osteoarthritis     Sleep apnea     Unspecified sleep apnea        Past Surgical History:        Procedure Laterality Date    COLONOSCOPY      JOINT REPLACEMENT      hip blateral    KNEE SURGERY      TOTAL HIP ARTHROPLASTY  03/2005    TURP         Medications Prior to Admission:    Prior to Admission medications    Medication Sig Start Date End Date Taking?  Authorizing Provider   donepezil (ARICEPT) 10 MG tablet TAKE ONE TABLET BY MOUTH ONCE NIGHTLY 1/27/23   Charlette Mchugh DO   QUEtiapine (SEROQUEL) 25 MG tablet Take 1 tablet by mouth 2 times daily as needed for Agitation 1/27/23   Charlette Mchugh DO   dorzolamide (TRUSOPT) 2 % ophthalmic solution Place 1 drop into both eyes in the morning and at bedtime    Historical Provider, MD   memantine (NAMENDA) 5 MG tablet Take 1 tablet by mouth 2 times daily  Patient not taking: No sig reported 1/12/23   Bijan Sales MD   lisinopril (PRINIVIL;ZESTRIL) 10 MG tablet Take 10 mg by mouth daily    Historical Provider, MD       Allergies:  Patient has no known allergies. Social History:  The patient currently was at his daughter's house    TOBACCO:   reports that he has never smoked. He has never used smokeless tobacco.  ETOH:   reports no history of alcohol use. Family History:   Positive as follows:        Problem Relation Age of Onset    Heart Disease Mother     Sudden Death Father 68        drowning       REVIEW OF SYSTEMS:       Difficult to obtain ROS: Dementia  +left hip pain  Psychiatric/Behavorial: per psychiatric evaluation    PHYSICAL EXAM:    BP (!) 143/65   Pulse 56   Temp 98 °F (36.7 °C) (Temporal)   Resp 18   SpO2 97%     Gen: No distress. Alert. Eyes: PERRL. No sclera icterus. No conjunctival injection. ENT: No discharge. Pharynx clear. Neck: Trachea midline. Resp: No accessory muscle use. No crackles. No wheezes. No rhonchi. CV: Regular rate. Regular rhythm. No murmur. No rub. No edema. GI: Non-tender. Non-distended. Normal bowel sounds. Skin: Warm and dry. No nodule on exposed extremities. No rash on exposed extremities. M/S: No cyanosis. No joint deformity. No clubbing. Left hip pain  Neuro: Awake. No focal neurologic deficit on exam.  Cranial nerves II through XII intact. Patient is able to ambulate without difficulty.   Psych: Per psychiatry team evaluation       CBC:   Recent Labs     01/27/23 2019   WBC 6.7   HGB 12.0*   HCT 37.0*   MCV 94.6        BMP:   Recent Labs     01/27/23 2019      K 4.1      CO2 21   BUN 18   CREATININE 0.8     LIVER PROFILE:   Recent Labs     01/27/23 2019   AST 16   ALT 38   BILITOT 0.4   ALKPHOS 101     UA:  Recent Labs     01/28/23  0047   COLORU Yellow   PHUR 6.5  6.5   WBCUA 5   RBCUA 3-4   YEAST Present*   BACTERIA Rare*   CLARITYU Clear   SPECGRAV 1.020 LEUKOCYTESUR Negative   UROBILINOGEN 1.0   BILIRUBINUR Negative   BLOODU Negative   GLUCOSEU Negative           CULTURES  COVID: not detected  Flu: negative    EKG:  I have reviewed the EKG with the following interpretation:   Sinus rhythm with occasional Premature ventricular complexes, Cannot rule out Anterior infarct , age undetermined    RADIOLOGY  CT HEAD WO CONTRAST   Final Result   Mild atrophy and mild chronic microischemic changes scattered in the deep   white matter which is unchanged with no acute abnormality seen. XR CHEST PORTABLE   Final Result   No radiographic evidence of an acute cardiopulmonary process. ASSESSMENT/PLAN:  Left greater trochanter fracture  -PT/OT  -walking with a walker  -to Follow up with Gabbi Davenport in 1 month. Hypertension   -BP currently stable  -holding Lisinopril for now. Dementia  -management per psychiatry    Pt has no medical complaints at this time. They were informed that should a medical concern arise during their admission they may have [facility] contact us.     Michell Matamoros FNP-C  1/28/2023 1:57 PM

## 2023-01-28 NOTE — ED NOTES
Patient resting comfortably in bed with eyes closed. No acute distress noted at this time.      Graciela Jordan RN  01/27/23 0520

## 2023-01-28 NOTE — ED NOTES
Patient resting in bed with room door open and sitter at bedside. No distress noted at this time.       Dillan Teixeira RN  01/28/23 5986
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
## 2023-01-28 NOTE — ED NOTES
Patient resting in bed with eyes closed, no acute distress noted. Room door open.       Louise Ricardo RN  01/27/23 8313

## 2023-01-28 NOTE — PROGRESS NOTES
Behavioral Services  Medicare Certification Upon Admission    I certify that this patient's inpatient psychiatric hospital admission is medically necessary for:    [x] (1) Treatment which could reasonably be expected to improve this patient's condition,       [x] (2) Or for diagnostic study;     AND     [x](2) The inpatient psychiatric services are provided while the individual is under the care of a physician and are included in the individualized plan of care.     Estimated length of stay/service 2-5 days    Plan for post-hospital care outpatient services, possible SNF    Electronically signed by ADRIANA Elmore CNP on 1/28/2023 at 9:36 AM

## 2023-01-28 NOTE — ED NOTES
Patient resting in bed, no acute distress noted at this time. Room door open.       Esperanza Toussaint RN  01/27/23 5947

## 2023-01-29 LAB
CHOLESTEROL, TOTAL: 196 MG/DL (ref 0–199)
ESTIMATED AVERAGE GLUCOSE: 111.2 MG/DL
HBA1C MFR BLD: 5.5 %
HDLC SERPL-MCNC: 52 MG/DL (ref 40–60)
LDL CHOLESTEROL CALCULATED: 131 MG/DL
TRIGL SERPL-MCNC: 66 MG/DL (ref 0–150)
VLDLC SERPL CALC-MCNC: 13 MG/DL

## 2023-01-29 PROCEDURE — 6370000000 HC RX 637 (ALT 250 FOR IP): Performed by: NURSE PRACTITIONER

## 2023-01-29 PROCEDURE — 6370000000 HC RX 637 (ALT 250 FOR IP)

## 2023-01-29 PROCEDURE — 1240000000 HC EMOTIONAL WELLNESS R&B

## 2023-01-29 RX ORDER — DORZOLAMIDE HCL 20 MG/ML
1 SOLUTION/ DROPS OPHTHALMIC 2 TIMES DAILY
Status: DISCONTINUED | OUTPATIENT
Start: 2023-01-29 | End: 2023-02-07 | Stop reason: HOSPADM

## 2023-01-29 RX ORDER — LISINOPRIL 10 MG/1
10 TABLET ORAL DAILY
Status: DISCONTINUED | OUTPATIENT
Start: 2023-01-29 | End: 2023-02-07 | Stop reason: HOSPADM

## 2023-01-29 RX ADMIN — ACETAMINOPHEN 650 MG: 325 TABLET ORAL at 05:59

## 2023-01-29 RX ADMIN — HYDROXYZINE HYDROCHLORIDE 50 MG: 50 TABLET, FILM COATED ORAL at 23:46

## 2023-01-29 RX ADMIN — DORZOLAMIDE HYDROCHLORIDE 1 DROP: 20 SOLUTION/ DROPS OPHTHALMIC at 20:05

## 2023-01-29 RX ADMIN — DONEPEZIL HYDROCHLORIDE 5 MG: 5 TABLET, FILM COATED ORAL at 20:05

## 2023-01-29 RX ADMIN — ACETAMINOPHEN 650 MG: 325 TABLET ORAL at 21:40

## 2023-01-29 RX ADMIN — DORZOLAMIDE HYDROCHLORIDE 1 DROP: 20 SOLUTION/ DROPS OPHTHALMIC at 10:08

## 2023-01-29 RX ADMIN — OLANZAPINE 5 MG: 5 TABLET, FILM COATED ORAL at 08:08

## 2023-01-29 RX ADMIN — TRAZODONE HYDROCHLORIDE 50 MG: 50 TABLET ORAL at 23:46

## 2023-01-29 RX ADMIN — MEMANTINE 5 MG: 5 TABLET ORAL at 08:08

## 2023-01-29 RX ADMIN — QUETIAPINE FUMARATE 25 MG: 25 TABLET ORAL at 20:06

## 2023-01-29 RX ADMIN — QUETIAPINE FUMARATE 25 MG: 25 TABLET ORAL at 08:08

## 2023-01-29 RX ADMIN — OLANZAPINE 5 MG: 5 TABLET, FILM COATED ORAL at 21:40

## 2023-01-29 RX ADMIN — Medication 5 MG: at 17:13

## 2023-01-29 ASSESSMENT — PAIN DESCRIPTION - LOCATION
LOCATION: HIP

## 2023-01-29 ASSESSMENT — PAIN - FUNCTIONAL ASSESSMENT: PAIN_FUNCTIONAL_ASSESSMENT: PREVENTS OR INTERFERES SOME ACTIVE ACTIVITIES AND ADLS

## 2023-01-29 ASSESSMENT — PAIN SCALES - PAIN ASSESSMENT IN ADVANCED DEMENTIA (PAINAD)
NEGVOCALIZATION: 1
BREATHING: 1
TOTALSCORE: 5
FACIALEXPRESSION: 2
CONSOLABILITY: 0
BODYLANGUAGE: 1

## 2023-01-29 ASSESSMENT — PAIN SCALES - GENERAL
PAINLEVEL_OUTOF10: 3
PAINLEVEL_OUTOF10: 3
PAINLEVEL_OUTOF10: 0

## 2023-01-29 ASSESSMENT — PAIN DESCRIPTION - ORIENTATION
ORIENTATION: LEFT
ORIENTATION: LEFT

## 2023-01-29 ASSESSMENT — PAIN DESCRIPTION - DESCRIPTORS
DESCRIPTORS: ACHING
DESCRIPTORS: ACHING

## 2023-01-29 ASSESSMENT — PAIN SCALES - WONG BAKER
WONGBAKER_NUMERICALRESPONSE: 0
WONGBAKER_NUMERICALRESPONSE: 0

## 2023-01-29 NOTE — PROGRESS NOTES
Senior Purposeful Rounding     Position:Back and Repositions Self     Physical Environment:Room free from clutter, Clear path to bathroom , Adequate lighting, and No safety hazards noted     Pain Rating/ Nonverbal Pain Behaviors:0     Pain interventions Attempted: None     Patient Toileted:Continent and Independent

## 2023-01-29 NOTE — PLAN OF CARE
Problem: Safety - Adult  Goal: Free from fall injury  Outcome: Progressing     Problem: ABCDS Injury Assessment  Goal: Absence of physical injury  Outcome: Progressing     Problem: Risk for Elopement  Goal: Patient will not exit the unit/facility without proper excort  Outcome: Progressing  Flowsheets (Taken 1/28/2023 1400 by Dylan Alvarado RN)  Nursing Interventions for Elopement Risk:   Collaborate with family members/caregivers to mitigate the elopement risk   Communicate/escalate to charge nurse the risk of elopement   Make sure patient has all necessary personal care items   Reduce environmental triggers     Problem: Behavior  Goal: Pt/Family maintain appropriate behavior and adhere to behavioral management agreement, if implemented  Description: INTERVENTIONS:  1. Assess patient/family's coping skills and  non-compliant behavior (including use of illegal substances)  2. Notify security of behavior or suspected illegal substances which indicate the need for search of the family and/or belongings  3. Encourage verbalization of thoughts and concerns in a socially appropriate manner  4. Utilize positive, consistent limit setting strategies supporting safety of patient, staff and others  5. Encourage participation in the decision making process about the behavioral management agreement  6. If a visitor's behavior poses a threat to safety call refer to organization policy. 7. Initiate consult with , Psychosocial CNS, Spiritual Care as appropriate  Outcome: Progressing     Problem: Anxiety  Goal: Will report anxiety at manageable levels  Description: INTERVENTIONS:  1. Administer medication as ordered  2. Teach and rehearse alternative coping skills  3. Provide emotional support with 1:1 interaction with staff  Outcome: Progressing     Problem: Confusion  Goal: Confusion, delirium, dementia, or psychosis is improved or at baseline  Description: INTERVENTIONS:  1.  Assess for possible contributors to thought disturbance, including medications, impaired vision or hearing, underlying metabolic abnormalities, dehydration, psychiatric diagnoses, and notify attending LIP  2. [place] high risk fall precautions, as indicated  3. Provide frequent short contacts to provide reality reorientation, refocusing and direction  4. Decrease environmental stimuli, including noise as appropriate  5. Monitor and intervene to maintain adequate nutrition, hydration, elimination, sleep and activity  6. If unable to ensure safety without constant attention obtain sitter and review sitter guidelines with assigned personnel  7. Initiate Psychosocial CNS and Spiritual Care consult, as indicated  Outcome: Not Progressing    Patient slept in chair most of evening. Easily awakens for care. Pleasantly confused. Oriented to self and place. Asks many questions and repeats questions. Medication compliant. No outbursts thus far this shift. No verbal aggression. No response to internal stimulus.

## 2023-01-29 NOTE — PROGRESS NOTES
Senior Purposeful Rounding    Position:Left Side and Repositions Self    Physical Environment:Room free from clutter, Clear path to bathroom , Adequate lighting, and No safety hazards noted    Pain Rating/ Nonverbal Pain Behaviors:0    Pain interventions Attempted: None    Patient Toileted:Continent

## 2023-01-29 NOTE — PLAN OF CARE
Ana Tilley is up ad avery with a slow steady gait using a walker. He is alert with confusion. He is forgetful and repetitive. He asks to shave frequently, although he shaved this am using a surgical electric razor. They do not shave close so pt believes he did not shave. He is fixated on his wallet and whether is social security card is in there. The wallet was removed from the safe and pt was shown all contents and his checkbook. He then requested the two watches he was wearing be placed in the bag as well. They were all put in the safe. He continues to perseverate on the wallet and is paranoid about where his money went. He states he gets social security and should have more than $79 in his wallet. He also does not remember seeing the wallet today. He needs frequent redirection. He denies SI/HI and AVH. He has not been noted to be RTIS. Problem: Anxiety  Goal: Will report anxiety at manageable levels  Description: INTERVENTIONS:  1. Administer medication as ordered  2. Teach and rehearse alternative coping skills  3. Provide emotional support with 1:1 interaction with staff  Outcome: Not Progressing  Flowsheets (Taken 1/29/2023 1745)  Will report anxiety at manageable levels:   Administer medication as ordered   Teach and rehearse alternative coping skills   Provide emotional support with 1:1 interaction with staff     Problem: Confusion  Goal: Confusion, delirium, dementia, or psychosis is improved or at baseline  Description: INTERVENTIONS:  1. Assess for possible contributors to thought disturbance, including medications, impaired vision or hearing, underlying metabolic abnormalities, dehydration, psychiatric diagnoses, and notify attending LIP  2. [place] high risk fall precautions, as indicated  3. Provide frequent short contacts to provide reality reorientation, refocusing and direction  4. Decrease environmental stimuli, including noise as appropriate  5.  Monitor and intervene to maintain adequate nutrition, hydration, elimination, sleep and activity  6. If unable to ensure safety without constant attention obtain sitter and review sitter guidelines with assigned personnel  7.  Initiate Psychosocial CNS and Spiritual Care consult, as indicated  Outcome: Not Progressing  Flowsheets (Taken 1/29/2023 1745)  Effect of thought disturbance (confusion, delirium, dementia, or psychosis) are managed with adequate functional status:   Provide frequent short contacts to provide reality reorientation, refocusing and direction   Decrease environmental stimuli, including noise as appropriate

## 2023-01-29 NOTE — PROGRESS NOTES
Senior Purposeful Rounding    Position:Sitting and Repositions Self    Physical Environment:Room free from clutter, Clear path to bathroom , Adequate lighting, and No safety hazards noted    Pain Rating/ Nonverbal Pain Behaviors:0    Pain interventions Attempted: None    Patient Toileted:Continent and Independent

## 2023-01-30 ENCOUNTER — TELEPHONE (OUTPATIENT)
Dept: INTERNAL MEDICINE CLINIC | Age: 88
End: 2023-01-30

## 2023-01-30 PROCEDURE — 1240000000 HC EMOTIONAL WELLNESS R&B

## 2023-01-30 PROCEDURE — 99232 SBSQ HOSP IP/OBS MODERATE 35: CPT

## 2023-01-30 PROCEDURE — 97110 THERAPEUTIC EXERCISES: CPT

## 2023-01-30 PROCEDURE — 6370000000 HC RX 637 (ALT 250 FOR IP): Performed by: PSYCHIATRY & NEUROLOGY

## 2023-01-30 PROCEDURE — 6370000000 HC RX 637 (ALT 250 FOR IP)

## 2023-01-30 PROCEDURE — 6370000000 HC RX 637 (ALT 250 FOR IP): Performed by: NURSE PRACTITIONER

## 2023-01-30 PROCEDURE — 97116 GAIT TRAINING THERAPY: CPT

## 2023-01-30 RX ORDER — QUETIAPINE FUMARATE 25 MG/1
50 TABLET, FILM COATED ORAL 2 TIMES DAILY
Status: DISCONTINUED | OUTPATIENT
Start: 2023-01-30 | End: 2023-02-02

## 2023-01-30 RX ORDER — IBUPROFEN 400 MG/1
400 TABLET ORAL EVERY 6 HOURS PRN
Status: DISCONTINUED | OUTPATIENT
Start: 2023-01-30 | End: 2023-02-07 | Stop reason: HOSPADM

## 2023-01-30 RX ORDER — OLANZAPINE 5 MG/1
5 TABLET ORAL ONCE
Status: COMPLETED | OUTPATIENT
Start: 2023-01-30 | End: 2023-01-30

## 2023-01-30 RX ADMIN — DORZOLAMIDE HYDROCHLORIDE 1 DROP: 20 SOLUTION/ DROPS OPHTHALMIC at 21:05

## 2023-01-30 RX ADMIN — Medication 5 MG: at 18:43

## 2023-01-30 RX ADMIN — MEMANTINE 5 MG: 5 TABLET ORAL at 09:56

## 2023-01-30 RX ADMIN — LISINOPRIL 10 MG: 10 TABLET ORAL at 09:56

## 2023-01-30 RX ADMIN — QUETIAPINE FUMARATE 50 MG: 25 TABLET ORAL at 21:04

## 2023-01-30 RX ADMIN — DORZOLAMIDE HYDROCHLORIDE 1 DROP: 20 SOLUTION/ DROPS OPHTHALMIC at 09:56

## 2023-01-30 RX ADMIN — DONEPEZIL HYDROCHLORIDE 5 MG: 5 TABLET, FILM COATED ORAL at 21:04

## 2023-01-30 RX ADMIN — OLANZAPINE 5 MG: 5 TABLET, FILM COATED ORAL at 01:37

## 2023-01-30 RX ADMIN — QUETIAPINE FUMARATE 25 MG: 25 TABLET ORAL at 09:57

## 2023-01-30 RX ADMIN — ACETAMINOPHEN 650 MG: 325 TABLET ORAL at 01:38

## 2023-01-30 ASSESSMENT — PAIN SCALES - GENERAL
PAINLEVEL_OUTOF10: 3
PAINLEVEL_OUTOF10: 0

## 2023-01-30 NOTE — PROGRESS NOTES
Pt continued to be anxious for several hrs after receiving prns.  Pt did eventually lay down at 0430 and has been resting w/ RR easy and unlabored

## 2023-01-30 NOTE — BH NOTE
Patient has not been as focused on discharged this afternoon after being told multiple times that he would not be leaving today and that [facility], NP has left for the day. Much calmer and cooperative with care.

## 2023-01-30 NOTE — BH NOTE
Spoke with pt's daughter Brandi Buchanan and obtained collateral ([phone])      Debo Cote reports pt was diagnosed with dementia over 5 yrs ago. He has lived alone and was driving up until he broke his hip. He has declined significantly since his hip injury. She stated pt is typically very sweet and not aggressive at all. The agitation and aggression have started since his hip injury. Daria's plan is for pt to live with her post hospitalization. She stated she promised pt she would never put him \"in a home,\" and she plans to keep that promise. She feels like she has enough support and resources to care for him safely at home. She has many of the equipment she will need to care for pt (shower chairs/benches, keyed deadbolts, etc, and we discussed other safety related items that may be beneficial.  She is interested in referrals for specialized dementia care and discussed the [address] She feels like this would be a good option for pt and is interested in a referral for treatment. Daria's goal for this admission is for pt's agitation to be stabilized, and to have him prescribed a PRN medication she can give in case of any agitated/aggressive episodes. Obtained verbal consent from Debo Cote (pt's POA) for admission and treatment.

## 2023-01-30 NOTE — PROGRESS NOTES
Department of Psychiatry  AttendingProgress Note  Chief Complaint: Dementia with behavioral disturbance    Patient's chart was reviewed and collaborated with  about the treatment plan. SUBJECTIVE:    Pt remains confused. Focused on his wallet, continually asking to see it and count his money. Pt also asking to go home. He is afraid he is being sent to another place, and he just wants to be in his home with his car. Pt oriented to self, able to recall events from his past, but thinks [place] is in [place] and the year is 2021. Pt did not sleep well overnight. Goal is discharge home with family. Plan:  Increase Seroquel to 50mg BID    Patient is feeling unchanged. Suicidal ideation:  denies suicidal ideation. Patient does not have medication side effects. ROS: Patient has new complaints: no  Sleeping adequately:  No:    Appetite adequate: Yes  Attending groups: No:   Visitors:No    OBJECTIVE    Physical  VITALS:  /63   Pulse 66   Temp 97.7 °F (36.5 °C) (Oral)   Resp 14   Ht 5' 11\" (1.803 m)   Wt 207 lb 1.6 oz (93.9 kg)   SpO2 99%   BMI 28.88 kg/m²     Mental Status Examination:  Patients appearance was well-appearing, street clothes, and in chair. Thoughts are Obsessive. Homicidal ideations none. No abnormal movements, tics or mannerisms. Memory impaired immediate recall and recent memory Aims 0. Concentration Fair. Alert and oriented X 4. Insight and Judgement impaired insight. Patient was cooperative.  Patient gait normal. Mood irritable, affect agitation Hallucinations Absent, suicidal ideations no specific plan to harm self Speech normal pitch and normal volume    Data  Labs:   Admission on 01/28/2023   Component Date Value Ref Range Status    TSH 01/28/2023 3.30  0.27 - 4.20 uIU/mL Final    Cholesterol, Total 01/28/2023 196  0 - 199 mg/dL Final    Triglycerides 01/28/2023 66  0 - 150 mg/dL Final    HDL 01/28/2023 52  40 - 60 mg/dL Final    Comment: An HDL cholesterol less than 40 mg/dL is low and  constitutes a coronary heart disease risk factor. An HDL cholesterol greater than 60 mg/dL is a  negative risk factor for coronary heart disease. LDL Calculated 01/28/2023 131 (A)  <100 mg/dL Final    VLDL Cholesterol Calculated 01/28/2023 13  Not Established mg/dL Final    Hemoglobin A1C 01/28/2023 5.5  See comment % Final    Comment: Comment:  Diagnosis of Diabetes: > or = 6.5%  Increased risk of diabetes (Prediabetes): 5.7-6.4%  Glycemic Control: Nonpregnant Adults: <7.0%                    Pregnant: <6.0%        eAG 01/28/2023 111.2  mg/dL Final            Medications  Current Facility-Administered Medications: QUEtiapine (SEROQUEL) tablet 50 mg, 50 mg, Oral, BID  ibuprofen (ADVIL;MOTRIN) tablet 400 mg, 400 mg, Oral, Q6H PRN  lisinopril (PRINIVIL;ZESTRIL) tablet 10 mg, 10 mg, Oral, Daily  dorzolamide (TRUSOPT) 2 % ophthalmic solution 1 drop, 1 drop, Both Eyes, BID  magnesium hydroxide (MILK OF MAGNESIA) 400 MG/5ML suspension 30 mL, 30 mL, Oral, Daily PRN  nicotine polacrilex (COMMIT) lozenge 2 mg, 2 mg, Oral, Q1H PRN  aluminum & magnesium hydroxide-simethicone (MAALOX) 200-200-20 MG/5ML suspension 30 mL, 30 mL, Oral, Q6H PRN  hydrOXYzine HCl (ATARAX) tablet 50 mg, 50 mg, Oral, TID PRN  OLANZapine (ZYPREXA) tablet 5 mg, 5 mg, Oral, Q4H PRN **OR** OLANZapine (ZYPREXA) 10 mg in sterile water 2 mL injection, 10 mg, IntraMUSCular, Q4H PRN  diphenhydrAMINE (BENADRYL) injection 50 mg, 50 mg, IntraMUSCular, Q4H PRN  traZODone (DESYREL) tablet 50 mg, 50 mg, Oral, Nightly PRN  donepezil (ARICEPT) tablet 5 mg, 5 mg, Oral, Nightly  memantine (NAMENDA) tablet 5 mg, 5 mg, Oral, Daily  melatonin disintegrating tablet 5 mg, 5 mg, Oral, QPM    ASSESSMENT AND PLAN    Principal Problem:    Dementia with behavioral disturbance  Active Problems:    Closed left hip fracture, sequela    Hypertension, essential  Resolved Problems:    * No resolved hospital problems. *       1. Patient's symptoms show no change  2. Probable discharge is next week  3. Discharge planning is incomplete  4. Suicidal ideation is better  5. Total time with patient was 40 minutes and more than 50 % of that time was spent counseling the patient on their symptoms, treatment and expected goals.      Chelsea Edmonds, PMHNP-BC

## 2023-01-30 NOTE — PROGRESS NOTES
Pt continuing anxiety, restless, and agitation. Pt hitting his walker on the ground and asking repetitive questions to leave and where his keys and car is. Verbal redirection has not been effective.  Prn trazodone and atarax given at 2346

## 2023-01-30 NOTE — GROUP NOTE
Group Therapy Note    Date: 1/30/2023    Group Start Time: 1300  Group End Time: 1345  Group Topic: Activity    [facility]    Tammie Chavez        Group Therapy Note    Attendees: 4      Facilitator led a balloon toss activity. Patients and facilitator took turns bounces a balloon to each other with the objective of keeping the balloon from touching the group. When patients dropped the balloon they would answer a reminiscing question, when the facilitator dropped the balloon patients could ask questions to the facilitator.     After the activity concluded, patients sat and discussed \"favorites\" and built connections.        Patient's Goal:  Patient will participate in balloon activity to develop coordination and motor skills, and build group cohesion. Patient will join in group discussion.     Notes:  Patient did not participate in the balloon activity but did join the group discussion.     Status After Intervention:  Improved    Participation Level: Active Listener and Interactive    Participation Quality: Appropriate and Attentive      Speech:  normal      Thought Process/Content: Logical      Affective Functioning: Congruent      Mood: euthymic      Level of consciousness:  Alert and Attentive      Response to Learning: Able to verbalize current knowledge/experience, Able to change behavior, and Progressing to goal      Endings: None Reported    Modes of Intervention: Socialization and Activity      Discipline Responsible: /Counselor      Signature:  LELAND Tamez

## 2023-01-30 NOTE — DISCHARGE INSTRUCTIONS
Advanced Directives:  Patient does have a surrogate decision maker appointed   Name (if yes): Brendon Pavon                         Phone Number: [phone]  Patient does have a psychiatric and/or medical advanced directive or power of . Discharge Planning is Complete. 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 If you have any questions please call the unit at [phone] for the adult unit or [phone] for [facility]. Please note that we have a patient family advisory Kiana that meets the second Wednesday of January and the second Wednesday of July at [address] [facility] in [place] at [facility]. Department leadership would love for you to attend to give feedback on what we are doing well and areas in which we can improve our patient care. Please come if you are able and feel free to reach out to [place] [address] at [phone] with any questions. The crisis number for [facility] is [phone]. You can use this number at any time to access emergency mental health services. Please follow up with your PCP regarding any pending labs. Your next appointment is:  Name of Provider: Eulogio Griggs DO  Provider specialty/license: Primary Care   Date and time of appointment: Mopnday, February 13, 2023 at 9:20 a.m. The type/s of services requested are: [address] name: [facility]  [facility] [address] [facility] [address]  Phone Number: [phone]  Special instructions (what to bring to appointment, etc.): Please bring a photo ID, insurance card, current medications/medication list, and co-pay if you have one.      Other appointments:   Name of Provider: [address] [place] [address]    Provider specialty/license: Geriatric Medicine   Date and time of appointment:  To Be Determined  The type/s of services requested are: Assessment & Treatment  Agency name: [address] [place] [address]  Address: [address]  Phone Number: [phone]  Special instructions (what to bring to appointment, etc.): We have made a referral on your behalf to the [address] [place] [address]. They will call you directly to schedule your first appointment. If you do not hear from them in 1-2 business days, please follow-up with them directly. Your first appointment with them will last about 90 mins and will involve collecting information about your history and any current symptoms. Please bring 1-2 family members with you to this appointment to assist in providing information. Also, please bring a photo ID, insurance card, current medications/medication list, and co-pay if you have one. Discharge Completed By: LELAND Paris  Fax to:   [facility]: Has [employer] access, No fax needed. [address] [place] [address]: [phone]    The following personal items were collected during your admission and were returned to you:    Belongings  Dental Appliances: None  Vision - Corrective Lenses:  At home, [employer] [facility]: None  Clothing: Socks, Shirt, Pants, Jacket/Coat, Footwear (Long sleeve shirt, white t shirt, maroon sweats, loafers, socks)  Jewelry: Watch (2x watches on pt's wrist)  Body Piercings Removed: N/A  Electronic Devices: None  Weapons (Notify Protective Services/Security): None  Other Valuables: Wallet, Money, Other (Comment) ($79.00 cash in wallet, 's license and ss card, checkbook - in safe)  Home Medications: None  Valuables Given To: Locker, Other (Comment) (Safe)  Provide Name(s) of Who Valuable(s) Were Given To: N/A  Responsible person(s) in the waiting room: N/A  Patient approves for provider to speak to responsible person post operatively: Yes    By signing below, I understand and acknowledge receipt of the instructions indicated above.

## 2023-01-30 NOTE — PROGRESS NOTES
Pt continues to be anxious and restless. Prn tylenol given for hip pain and prn zyprexa given at 2140 has not been effective.  Pt continues to ask to leave and is difficult to redirect

## 2023-01-30 NOTE — BH NOTE
Morgan was visible in the dayroom for breakfast this morning. He has since been withdrawn to his room. Asking the same questions multiple times in conversation. States \"what the hell am I even doing here\" and asks when he will get to go home. He is concerned about his wallet and money which appears per chart review to be a continuous concern he has. Hypertensive this morning, took scheduled medications which included lisinopril. He remains irritable this morning and focused on leaving.

## 2023-01-30 NOTE — TELEPHONE ENCOUNTER
Pt wants help with her father. She states she is having a hard time handling her father. She is wanting either Hospice or Palliative Care. She wants something when he leaves the hospital that will calm him down. She states she is not sending to a home. She works 4 hours out of the house. She states she is going to hire someone for that time she is away. She is also wanting something for pain in his hip.  When he gets to leave the hospital.

## 2023-01-30 NOTE — BH NOTE
Patient was in dayroom when peer began to become agitated. Peer began arguing with Caroline Donis, upsetting Morgan. Caroline Donis was able to be redirected to his room. Morgan let this writer know that he use to wrestle when he was younger, but due to old age he no longer wrestles.

## 2023-01-30 NOTE — PLAN OF CARE
Problem: Risk for Elopement  Goal: Patient will not exit the unit/facility without proper excort  1/29/2023 2119 by Gavin Mari RN  Outcome: Progressing     Problem: Behavior  Goal: Pt/Family maintain appropriate behavior and adhere to behavioral management agreement, if implemented  Description: INTERVENTIONS:  1. Assess patient/family's coping skills and  non-compliant behavior (including use of illegal substances)  2. Notify security of behavior or suspected illegal substances which indicate the need for search of the family and/or belongings  3. Encourage verbalization of thoughts and concerns in a socially appropriate manner  4. Utilize positive, consistent limit setting strategies supporting safety of patient, staff and others  5. Encourage participation in the decision making process about the behavioral management agreement  6. If a visitor's behavior poses a threat to safety call refer to organization policy. 7. Initiate consult with , Psychosocial CNS, Spiritual Care as appropriate  1/29/2023 2119 by Gavin Mari RN  Outcome: Progressing     Pt has been visible on the unit extremely anxious and restless. He continues to perseverate on his wallet, keys, and house. Despite numerous instances of redirection, he continues to come up to the desk and ask about these items. He is alert to self only. He did eat some snacks and did drink some orange juice. He did talk to his daughter on the phone but it did make him more anxious. BP elevated upon arrival 188/86. Rechecked at 2117 and it was 169/64. He is currently sitting watching tv.  Denies needs currently

## 2023-01-30 NOTE — TELEPHONE ENCOUNTER
On Saturday 1/28/23 the patients daughter called Ameya Cárdenas at [facility] stating that the patient was currently at [facility]. She is expressed her concerns with Lynn Peters is willing fax over a referral for the doctor to sign for patient to have Hospice care. I gave her our fax number and the referral will have everything listed that is needed. Contact info: [phone]     Please advise.  Thank you

## 2023-01-30 NOTE — BH NOTE
Luma Kumari is worried about his finances and paying his bills. He continues to perseverate on where his wallet is, where his doctor is, when he will be discharged, and his daughter Romy Ulloa having his keys.

## 2023-01-30 NOTE — PROGRESS NOTES
Inpatient Geriatric  Behavior Health Physical Therapy Daily Treatment Note    Unit:  [facility]  Date:  1/30/2023  Patient Name:    Morgan Harrell  Admitting diagnosis:  Dementia with behavioral disturbance [F03.918]  Admit Date:  1/28/2023  Precautions/Restrictions:    Standard BHI Precautions  Fall Risk  confusion  WB restrictions (50% LLE, no abduction)    History of Present Illness:  Per H&P  \"The patient is a 87 y.o. male with Dementia, recent hip fracture, hypertension, and hyperlipidemia who presented to [facility] ED with complaint of with increased confusion and combative behavior.  Patient was seen and evaluated in the ED by the ED medical provider, patient was medically cleared for admission to [facility] [place] [facility].  This note serves as an admission medical H&P.     Patient was at [facility] 1/11-1/13.  Went to [facility] there 1/13-1/26/23.  Then went to live with his daughter.  \"     Ortho evaluated and suggested 50% weightbearing on the left lower extremity. No abd of LLE.     Past Medical History:  has a past medical history of Benign prostatic hypertrophy without urinary obstruction, Bradycardia, COPD (chronic obstructive pulmonary disease) ([facility]), Dementia ([facility]), Glaucoma, Gout, Hyperlipidemia, Hypertension, Hypoxemia, Macular degeneration disease, Osteoarthritis, Sleep apnea, and Unspecified sleep apnea.         Discharge Recommendations from PHYSICAL perspective:                                          Home 24 hr assist and with home PT     DME needs for discharge:     Shower Chair     Therapy recommendations for staff:   Ax1 with RW /gait belt for transfers and ambulation within the unit 50% weight bearing LLE-use RW at all times.  No abduction of the LLE  Recommended bed alarm   Recommended patient sit in chair with arms     Reviewed with RN     Therapy recommendation for EMS Transport: can transport by wheelchair    [facility] S4 Level Recommendation: Level 1- Standard   AM-PAC Mobility Score   AM-PAC Inpatient Mobility Raw Score : 23        Treatment Time:  13:49 - 14:13  Treatment number: 2     Subjective:    Pt. Agreeable to tx. Pt requesting to use BR first.    Cognition  A&O to orientation not directly assessed. Able to follow:  2 step commands  Pt very preoccupied with his wallet. Per RN, pt was allowed to look through his wallet earlier today and the wallet is secured at the nurse's station. Discussed this with pt. Pain   Yes  Rating:   mild  Location:   L hip - pt states that hip always hurts  Pain Medicine Status: Denies need      Bed Mobility   Supine to Sit: Not Tested  Sit to Supine:  Not Tested  Rolling:  Not Tested  Scooting:  Supervision    Transfer Training   Sit to stand:  Supervision  Stand to sit:  Supervision  Bed to Chair:  Not Tested with use of N/A    Gait Training gait completed as indicated below  Deviations (firm surface/linoleum): forward flexed posture, decreased step length on right, decreased stance time on left, and varying gait speeds    Level of Assist:    Supervision  Assistive Device Used:    rolling walker (RW)  Distance:  275 ft  Cued on: steady, no LOB    Toileting  Sit>stand from toilet (standard height):  Not Tested  Stand>sit to toilet (standard height):   Not Tested  Dominga-care:      Not Tested  Hand hygiene:     Not Tested  LE pants management:    Not Tested  Cues: Therapeutic Exercises   Exercises in BOLD performed in unit today. Items not bolded are carried forward from prior visits for continuity of the record.   Exercise/Equipment Resistance/Repetitions Other comments     Strengthening exercises       Seated Ankle pumps x 20 reps B      LAQ x 20 reps B      Seated marching x 20 reps B      Standing heel raises x 20 reps B      Standing marching x 20 reps B      Standing mini squats x 10 reps B              Neuromuscular re-ed - balance exercises                               Balance  Static Sitting:  Normal  Dynamic Sitting:  Good   Static Standing: Good   Dynamic Standing: Good -   See above neuromuscular re-ed exercises    Patient Education       Educated on importance of continued strengthening to facilitate functional return  Educated on importance of continued activity   Educated on proper gait pattern and RW distance  Educated on role of PT, POC as well as importance of continued activity      Positioning Needs       Pt in community room, no alarm needed, positioned in proper neutral alignment and pressure relief provided. Needs within reach. Within line of sight of nursing staff. Response to Treatment and Activity Tolerance  Pt tolerated treatment well  No adverse symptoms noted w/activity      Assessment   Pt cooperative with PT tx, however very preoccupied with why he is being held at the hospital when he wants to go home. Pt demonstrating steady gait but with excessive forward flexion. PT adjusted RW height by raising it up by one inch and instructed pt on proper fit and use to promote upright posture. Noted less trunk lean with gait. Recommending Home 24 hr supervision and with home PT upon discharge as patient functioning close to baseline level and would benefit from continued therapy services    Goals (all goals ongoing unless otherwise indicated)  Patient Goals for Therapy: none stated     To be met in 3 visits:  1). Demonstrate improved safety awareness with functional mobility requiring less overall cueing. To be met in 6 visits:  1). Supine to/from sit  Independent to promote return to functional ADLs  2). Sit to/from stand   Modified Independent to promote return to functional ADLs  3). Gait: Ambulate Sridhar's with Modified Independent and use of rolling walker (RW) demonstrating improved gait pattern  4). Tolerate B LE exercises 3 sets of 10-15 reps to improve strength   5). Tolerated standing balance exercises with improved balance to Good  grade    Plan   Continue with plan of care.     Time Coded Treatment Minutes:   24 minutes    Total Treatment Time:   24 minutes      Brad Taylor PT, DPT, OMT-C  #[id number]      If patient discharges from this facility prior to next visit, this note will serve as the Discharge Summary.

## 2023-01-30 NOTE — PROGRESS NOTES
Pt continues to be restless, anxious, and paranoid. He is concerned about his keys, wallet, and how he is going to get home from [facility]. All attempts of redirection and reorientation have been ineffective. Pt remains confused. Received order from Dr. Rochelle Niño for 5mg of zyprexa po. Given at [phone]. Pt also appears to be in pain from his hip but continues to ambulate on it despite staffs attempt to get him to lay down and rest. Prn tylenol also given.

## 2023-01-30 NOTE — TELEPHONE ENCOUNTER
Patients daughter Светлана Elizabeth is requesting to speak with Sebastian Grace regarding her dad Morgan. Светлана Elizabeth refused to give me any info to sesnd back in a message. Please advise.  Thank you

## 2023-01-31 PROCEDURE — 97535 SELF CARE MNGMENT TRAINING: CPT

## 2023-01-31 PROCEDURE — 1240000000 HC EMOTIONAL WELLNESS R&B

## 2023-01-31 PROCEDURE — 6370000000 HC RX 637 (ALT 250 FOR IP): Performed by: NURSE PRACTITIONER

## 2023-01-31 PROCEDURE — 97530 THERAPEUTIC ACTIVITIES: CPT

## 2023-01-31 PROCEDURE — 99232 SBSQ HOSP IP/OBS MODERATE 35: CPT

## 2023-01-31 PROCEDURE — 6370000000 HC RX 637 (ALT 250 FOR IP)

## 2023-01-31 RX ADMIN — DORZOLAMIDE HYDROCHLORIDE 1 DROP: 20 SOLUTION/ DROPS OPHTHALMIC at 20:22

## 2023-01-31 RX ADMIN — QUETIAPINE FUMARATE 50 MG: 25 TABLET ORAL at 09:59

## 2023-01-31 RX ADMIN — Medication 5 MG: at 17:07

## 2023-01-31 RX ADMIN — QUETIAPINE FUMARATE 50 MG: 25 TABLET ORAL at 20:21

## 2023-01-31 RX ADMIN — IBUPROFEN 400 MG: 400 TABLET ORAL at 10:13

## 2023-01-31 RX ADMIN — MEMANTINE 5 MG: 5 TABLET ORAL at 09:59

## 2023-01-31 RX ADMIN — LISINOPRIL 10 MG: 10 TABLET ORAL at 09:59

## 2023-01-31 RX ADMIN — DONEPEZIL HYDROCHLORIDE 5 MG: 5 TABLET, FILM COATED ORAL at 20:22

## 2023-01-31 RX ADMIN — DORZOLAMIDE HYDROCHLORIDE 1 DROP: 20 SOLUTION/ DROPS OPHTHALMIC at 11:50

## 2023-01-31 ASSESSMENT — PAIN DESCRIPTION - LOCATION: LOCATION: HIP

## 2023-01-31 ASSESSMENT — PAIN SCALES - GENERAL
PAINLEVEL_OUTOF10: 7
PAINLEVEL_OUTOF10: 0

## 2023-01-31 ASSESSMENT — PAIN DESCRIPTION - ORIENTATION: ORIENTATION: LEFT

## 2023-01-31 NOTE — BH NOTE
Assumed care of patient at 0730. Patient remains sleeping in his bed at this time. Breathing even and unlabored.

## 2023-01-31 NOTE — BH NOTE
Patient c/o left hip pain. Given Ibuprofen per order, see MAR. Patient states pain is 7/10.  Will discuss pain options with PA and Psychiatrist.

## 2023-01-31 NOTE — PLAN OF CARE
Problem: Safety - Adult  Goal: Free from fall injury  1/31/2023 1111 by Juan River RN  Outcome: Progressing     Problem: ABCDS Injury Assessment  Goal: Absence of physical injury  1/31/2023 1111 by Juan River RN  Outcome: Progressing     Problem: Risk for Elopement  Goal: Patient will not exit the unit/facility without proper excort  1/31/2023 1111 by Juan River RN  Outcome: Progressing     Problem: Behavior  Goal: Pt/Family maintain appropriate behavior and adhere to behavioral management agreement, if implemented  Description: INTERVENTIONS:  1. Assess patient/family's coping skills and  non-compliant behavior (including use of illegal substances)  2. Notify security of behavior or suspected illegal substances which indicate the need for search of the family and/or belongings  3. Encourage verbalization of thoughts and concerns in a socially appropriate manner  4. Utilize positive, consistent limit setting strategies supporting safety of patient, staff and others  5. Encourage participation in the decision making process about the behavioral management agreement  6. If a visitor's behavior poses a threat to safety call refer to organization policy. 7. Initiate consult with , Psychosocial CNS, Spiritual Care as appropriate  1/31/2023 1111 by Juan River RN  Outcome: Progressing     Problem: Anxiety  Goal: Will report anxiety at manageable levels  Description: INTERVENTIONS:  1. Administer medication as ordered  2. Teach and rehearse alternative coping skills  3. Provide emotional support with 1:1 interaction with staff  1/31/2023 1111 by Juan River RN  Outcome: Progressing     Problem: Confusion  Goal: Confusion, delirium, dementia, or psychosis is improved or at baseline  Description: INTERVENTIONS:  1.  Assess for possible contributors to thought disturbance, including medications, impaired vision or hearing, underlying metabolic abnormalities, dehydration, psychiatric diagnoses, and notify attending LIP  2. [place] high risk fall precautions, as indicated  3. Provide frequent short contacts to provide reality reorientation, refocusing and direction  4. Decrease environmental stimuli, including noise as appropriate  5. Monitor and intervene to maintain adequate nutrition, hydration, elimination, sleep and activity  6. If unable to ensure safety without constant attention obtain sitter and review sitter guidelines with assigned personnel  7. Initiate Psychosocial CNS and Spiritual Care consult, as indicated  1/31/2023 1111 by Adolfo Moralez RN  Outcome: Progressing     Problem: Pain  Goal: Verbalizes/displays adequate comfort level or baseline comfort level  1/31/2023 1111 by Adolfo Moralez RN  Outcome: Not Progressing  Flowsheets (Taken 1/31/2023 1111)  Verbalizes/displays adequate comfort level or baseline comfort level: Assess pain using appropriate pain scale

## 2023-01-31 NOTE — FLOWSHEET NOTE
Senior Purposeful Rounding     Position:Repositions Self     Physical Environment:Room free from clutter, Clear path to bathroom , Adequate lighting, Bed alarm functioning, and No safety hazards noted     Pain Rating/ Nonverbal Pain Behaviors:0; asleep     Pain interventions Attempted: n/a     Patient Toileted:Independent

## 2023-01-31 NOTE — PLAN OF CARE
Shelly Flight has had a good evening. He has not been irritable at all. Continues to ask about wallet and keys but much less frequently. Written reminder that his wallet is locked up for safety and that his keys are with his daughter has been helpful. He is slow and steady with his walker. Oriented to person and place. Thinks he is here for his hip. Denies SI/HI/AVH. Med compliant. In bed asleep at present. Will continue to monitor overnight.

## 2023-01-31 NOTE — PROGRESS NOTES
Department of Psychiatry  AttendingProgress Note  Chief Complaint: Dementia with behavioral disturbance    Patient's chart was reviewed and collaborated with  about the treatment plan. SUBJECTIVE:    Pt remains confused, MOCA 15/30. Pt able to sleep overnight, currently resting in a chair in the common area. No behaviors or aggression were reported or noted since yesterday. Pt still asking repeatedly about his discharge, why he cannot leave, about his car and wallet. Goal is discharge home with family. Plan:  1/30: Increase Seroquel to 50mg BID    Patient is feeling unchanged. Suicidal ideation:  denies suicidal ideation. Patient does not have medication side effects. ROS: Patient has new complaints: no  Sleeping adequately:  No:    Appetite adequate: Yes  Attending groups: No:   Visitors:No    OBJECTIVE    Physical  VITALS:  /71   Pulse 74   Temp 98.4 °F (36.9 °C) (Temporal)   Resp 16   Ht 5' 11\" (1.803 m)   Wt 207 lb 1.6 oz (93.9 kg)   SpO2 95%   BMI 28.88 kg/m²     Mental Status Examination:  Patients appearance was well-appearing, street clothes, and in chair. Thoughts are Obsessive. Homicidal ideations none. No abnormal movements, tics or mannerisms. Memory impaired immediate recall and recent memory Aims 0. Concentration Fair. Alert and oriented X 4. Insight and Judgement impaired insight. Patient was cooperative.  Patient gait normal. Mood irritable, affect agitation Hallucinations Absent, suicidal ideations no specific plan to harm self Speech normal pitch and normal volume    Data  Labs:   Admission on 01/28/2023   Component Date Value Ref Range Status    TSH 01/28/2023 3.30  0.27 - 4.20 uIU/mL Final    Cholesterol, Total 01/28/2023 196  0 - 199 mg/dL Final    Triglycerides 01/28/2023 66  0 - 150 mg/dL Final    HDL 01/28/2023 52  40 - 60 mg/dL Final    Comment: An HDL cholesterol less than 40 mg/dL is low and  constitutes a coronary heart disease risk factor. An HDL cholesterol greater than 60 mg/dL is a  negative risk factor for coronary heart disease. LDL Calculated 01/28/2023 131 (A)  <100 mg/dL Final    VLDL Cholesterol Calculated 01/28/2023 13  Not Established mg/dL Final    Hemoglobin A1C 01/28/2023 5.5  See comment % Final    Comment: Comment:  Diagnosis of Diabetes: > or = 6.5%  Increased risk of diabetes (Prediabetes): 5.7-6.4%  Glycemic Control: Nonpregnant Adults: <7.0%                    Pregnant: <6.0%        eAG 01/28/2023 111.2  mg/dL Final            Medications  Current Facility-Administered Medications: QUEtiapine (SEROQUEL) tablet 50 mg, 50 mg, Oral, BID  ibuprofen (ADVIL;MOTRIN) tablet 400 mg, 400 mg, Oral, Q6H PRN  lisinopril (PRINIVIL;ZESTRIL) tablet 10 mg, 10 mg, Oral, Daily  dorzolamide (TRUSOPT) 2 % ophthalmic solution 1 drop, 1 drop, Both Eyes, BID  magnesium hydroxide (MILK OF MAGNESIA) 400 MG/5ML suspension 30 mL, 30 mL, Oral, Daily PRN  nicotine polacrilex (COMMIT) lozenge 2 mg, 2 mg, Oral, Q1H PRN  aluminum & magnesium hydroxide-simethicone (MAALOX) 200-200-20 MG/5ML suspension 30 mL, 30 mL, Oral, Q6H PRN  hydrOXYzine HCl (ATARAX) tablet 50 mg, 50 mg, Oral, TID PRN  OLANZapine (ZYPREXA) tablet 5 mg, 5 mg, Oral, Q4H PRN **OR** OLANZapine (ZYPREXA) 10 mg in sterile water 2 mL injection, 10 mg, IntraMUSCular, Q4H PRN  diphenhydrAMINE (BENADRYL) injection 50 mg, 50 mg, IntraMUSCular, Q4H PRN  traZODone (DESYREL) tablet 50 mg, 50 mg, Oral, Nightly PRN  donepezil (ARICEPT) tablet 5 mg, 5 mg, Oral, Nightly  memantine (NAMENDA) tablet 5 mg, 5 mg, Oral, Daily  melatonin disintegrating tablet 5 mg, 5 mg, Oral, QPM    ASSESSMENT AND PLAN    Principal Problem:    Dementia with behavioral disturbance  Active Problems:    Closed left hip fracture, sequela    Hypertension, essential  Resolved Problems:    * No resolved hospital problems. *       1. Patient's symptoms   show no change  2. Probable discharge is this week  3. Discharge planning is incomplete  4. Suicidal ideation is better  5. Total time with patient was 40 minutes and more than 50 % of that time was spent counseling the patient on their symptoms, treatment and expected goals.      Rebekah Garcia, PMTERESAP-BC

## 2023-01-31 NOTE — FLOWSHEET NOTE
Senior Purposeful Rounding    Position:Sitting    Physical Environment:Room free from clutter, Clear path to bathroom , Adequate lighting, Bed alarm functioning, and No safety hazards noted    Pain Rating/ Nonverbal Pain Behaviors:denies; 0    Pain interventions Attempted: n/a    Patient Toileted:Independent

## 2023-01-31 NOTE — BH NOTE
Morgan woke up this morning extremely confused on where he was. He is confused as to why he is inpatient. States he is having increased pain in left hip. Asks about his home, if he still has it, and who is taking care of his finances. He does ask if his children, Morgan and Carey have called. He is easily redirectable although looks distressed when discussing hospitalization and states he would just like to return home. He has shown no aggressive behaviors thus far this shift.

## 2023-01-31 NOTE — PROGRESS NOTES
Inpatient Occupational Therapy Treatment Note    Unit:  [facility]  Date:  1/31/2023  Patient Name:    Mara Mustafa  Admitting diagnosis:  Dementia with behavioral disturbance [J21.691]  Admit Date:  1/28/2023  Precautions/Restrictions/WB Status/ Lines/ Wounds/ Oxygen:  Standard BHI Precautions  WB restrictions (50%), No abd of LLE. History of Present Illness:  Per H&P  \"The patient is a 81 y.o. male with Dementia, recent hip fracture, hypertension, and hyperlipidemia who presented to [facility] ED with complaint of with increased confusion and combative behavior. Patient was seen and evaluated in the ED by the ED medical provider, patient was medically cleared for admission to [facility] at [facility]. This note serves as an admission medical H&P. Patient was at [facility] 1/11-1/13. Went to [facility]U there 1/13-1/26/23. Then went to live with his daughter. \"     Ortho evaluated and suggested 50% weightbearing on the left lower extremity. No abd of LLE. Treatment Number:  2    Treatment Time: [id number]  Timed Code Treatment Minutes:    41 minutes   Total Treatment Time:    41  minutes    Staff Recommendations:    Assist of 1 with use of rolling walker (RW) and gait belt for all transfers to/from bathroom  within room  within community room  to/from chair    Discharge Recommendations:  Home with 24/7 assist and home therapy    DME needs for discharge:  Shower Chair    AM-PAC Score: [address] [address] Level: Level 2- Social    MOCA:  Michael Cognitive Assessment ([address] [place] [address], Ne)  (See pt folder behind nurses station for copy of assessment while pt is admitted.)   Total Score: Sum of all subscores listed on the right-hand side. Add one point for an individual who has 12 years or fewer of formal education, for a possible maximum of 30 points.  A final total score of 26 and above is considered normal.     Education Level college     Visuospatial/Executive  3/5   Naming  2/3   Attention  6/6   Language 2/3   Abstraction  1/2 Delayed Recall    MIS = 8/15   0/5   Orientation  1/6   (additional point for <12 grade educations)     Total Score    15/30        Subjective:  Pt was found in his room today attempting to change his underwear. Was receptive to OT treatment. ADLs:  Self Care: Toileting: NT  Grooming: declined  Dressing: Mod A with LB, assist to don shoes and to thread B feet in to brief. Verbal cues needed  Bathing: NT  Self-Feeding: setup    Pain   Yes  Rating:mild  Location: L hip  Pain Medicine Status: RN notified      Cognition    A&O to Person  Able to follow:  1 step commands    Balance:     Good with RW    Bed mobility:  NT    Transfer Training:   Sit to stand:   SBA  Stand to sit:  SBA  Bed to Chair:  SBA and with use of RW  Standard toilet:   Not Tested    Activity Tolerance   Pt completed therapy session with Pain noted with standing/ambulation    Therapeutic Exercise: NT    Patient Education:   Role of OT, ADL retraining, and Recommendations for DC    Positioning Needs: In common room with needs met    Family Present:  No    Assessment: Pt tolerated session well. Was confused as to why he was here. Needed reminders as to why, then he would state, \"That's right, I fell at [facility]. I can't believe I broke my hip. \"    GOALS  To be met in 3 Visits:  1). Bed to toilet/BSC:   Independent   2). Added: Pt to tolerated performance of MoCA. -MET 1/31/23    To be met in 5 Visits:  1). Supine to/from Sit:             Independent   2). Upper Body Bathing:         Independent   3). Lower Body Bathing:         Independent   4). Upper Body Dressing:       Independent   5). Lower Body Dressing:       Independent   6). Pt to demonstrate UE exs x 15 reps with minimal cues.       Plan: cont with [facility] MS, OTR/L  #[id number]       If patient discharges from this facility prior to next visit, this note will serve as the Discharge Summary

## 2023-01-31 NOTE — TELEPHONE ENCOUNTER
Noted.  Spoke with patient's daughter. Advised patient's daughter to hold off on making any arrangements until he is ready for discharge from [facility] and encouraged her to work closely with  and discharge planner.

## 2023-01-31 NOTE — TELEPHONE ENCOUNTER
Called and spoke to Carey. Patient is not appropriate for hospice care. Can consider palliative care. Recommend holding off on making any decisions until he is ready for discharge from [facility] psych unit. Strongly recommend that she work closely with  for discharge planning and listen to care team regarding patient's needs. Strategies provided to patient to deal with dementia and agitation. Strongly recommend patient make the 36-Hour Day and  Liz Playbook. Carey expresses interest in these books and will acquire them from [employer].

## 2023-01-31 NOTE — FLOWSHEET NOTE
Senior Purposeful Rounding     Position:Sitting     Physical Environment:Room free from clutter, Clear path to bathroom , Adequate lighting, Bed alarm functioning, and No safety hazards noted     Pain Rating/ Nonverbal Pain Behaviors:denies; 0     Pain interventions Attempted: n/a     Patient Toileted:Independent and continent of bladder

## 2023-02-01 PROCEDURE — 1240000000 HC EMOTIONAL WELLNESS R&B

## 2023-02-01 PROCEDURE — 99232 SBSQ HOSP IP/OBS MODERATE 35: CPT

## 2023-02-01 PROCEDURE — 6370000000 HC RX 637 (ALT 250 FOR IP)

## 2023-02-01 PROCEDURE — 6370000000 HC RX 637 (ALT 250 FOR IP): Performed by: NURSE PRACTITIONER

## 2023-02-01 RX ADMIN — IBUPROFEN 400 MG: 400 TABLET ORAL at 00:17

## 2023-02-01 RX ADMIN — MEMANTINE 5 MG: 5 TABLET ORAL at 09:26

## 2023-02-01 RX ADMIN — Medication 5 MG: at 17:32

## 2023-02-01 RX ADMIN — DORZOLAMIDE HYDROCHLORIDE 1 DROP: 20 SOLUTION/ DROPS OPHTHALMIC at 20:19

## 2023-02-01 RX ADMIN — QUETIAPINE FUMARATE 50 MG: 25 TABLET ORAL at 20:17

## 2023-02-01 RX ADMIN — OLANZAPINE 5 MG: 5 TABLET, FILM COATED ORAL at 00:17

## 2023-02-01 RX ADMIN — DORZOLAMIDE HYDROCHLORIDE 1 DROP: 20 SOLUTION/ DROPS OPHTHALMIC at 09:26

## 2023-02-01 RX ADMIN — LISINOPRIL 10 MG: 10 TABLET ORAL at 09:26

## 2023-02-01 RX ADMIN — QUETIAPINE FUMARATE 50 MG: 25 TABLET ORAL at 09:26

## 2023-02-01 RX ADMIN — IBUPROFEN 400 MG: 400 TABLET ORAL at 21:25

## 2023-02-01 RX ADMIN — DONEPEZIL HYDROCHLORIDE 5 MG: 5 TABLET, FILM COATED ORAL at 20:17

## 2023-02-01 ASSESSMENT — PAIN DESCRIPTION - ORIENTATION
ORIENTATION: LEFT
ORIENTATION: LEFT

## 2023-02-01 ASSESSMENT — PAIN - FUNCTIONAL ASSESSMENT
PAIN_FUNCTIONAL_ASSESSMENT: PREVENTS OR INTERFERES SOME ACTIVE ACTIVITIES AND ADLS

## 2023-02-01 ASSESSMENT — PAIN SCALES - GENERAL
PAINLEVEL_OUTOF10: 2
PAINLEVEL_OUTOF10: 6
PAINLEVEL_OUTOF10: 2
PAINLEVEL_OUTOF10: 2

## 2023-02-01 ASSESSMENT — PAIN DESCRIPTION - DESCRIPTORS
DESCRIPTORS: ACHING

## 2023-02-01 ASSESSMENT — PAIN DESCRIPTION - PAIN TYPE: TYPE: ACUTE PAIN

## 2023-02-01 ASSESSMENT — PAIN DESCRIPTION - LOCATION
LOCATION: HIP

## 2023-02-01 ASSESSMENT — PAIN DESCRIPTION - ONSET: ONSET: ON-GOING

## 2023-02-01 ASSESSMENT — PAIN DESCRIPTION - FREQUENCY: FREQUENCY: CONTINUOUS

## 2023-02-01 NOTE — GROUP NOTE
Group Therapy Note    Date: 2/1/2023    Group Start Time: 1300  Group End Time: 5758  Group Topic: Activity    [address]        Group Therapy Note    Attendees: 3    Facilitator met with group members individually due to lack of interest in group activity. Patient spent time reminiscing on favorite pass times and hobbies. Patients also spent time grounding to the current moment by naming items in local surroundings and by counting objects/steps taken. Facilitator then led a group walk around the unit. Patients talked and walked together whilst sharing stories, singing, and discussing favorite songs. Patient's Goal:  Patient will spend 15 minutes meeting 1-1 with facilitator reminiscing and engaging in grounding practice. Patient will walk for 5 minutes around the unit. Notes:  Patient spoke with facilitator for 15 minutes and engaged in reminiscing and grounding activities. Patient also participated in group walk. Status After Intervention:  Improved    Participation Level:  Active Listener and Interactive    Participation Quality: Appropriate      Speech:  normal      Thought Process/Content: Logical      Affective Functioning: Congruent      Mood: euthymic      Level of consciousness:  Alert      Response to Learning: Able to verbalize current knowledge/experience and Progressing to goal      Endings: None Reported    Modes of Intervention: Support, Socialization, and Activity      Discipline Responsible: /Counselor      Signature:  LELAND Vu

## 2023-02-01 NOTE — FLOWSHEET NOTE
Senior Purposeful Rounding    Position:Repositions Self    Physical Environment:Room free from clutter, Clear path to bathroom , Adequate lighting, and No safety hazards noted    Pain Rating/ Nonverbal Pain Behaviors:denies;        Patient Toileted:Independent

## 2023-02-01 NOTE — PROGRESS NOTES
Department of Psychiatry  AttendingProgress Note  Chief Complaint: Dementia with behavioral disturbance    Patient's chart was reviewed and collaborated with  about the treatment plan. SUBJECTIVE:    Pt remains confused, MOCA 15/30. Pt resting in a chair in common area, asking about why he is here and who brought him here. He continues to ask about his car, why he cannot go back to his home. Pt states he wished he had never had this last fall, blames himself for being in this situation. Pt walking with walker, states he has some discomfort, but able to ambulate with out issue. Plan:  1/30: Increase Seroquel to 50mg BID    Patient is feeling unchanged. Suicidal ideation:  denies suicidal ideation. Patient does not have medication side effects. ROS: Patient has new complaints: no  Sleeping adequately:  No:    Appetite adequate: Yes  Attending groups: No:   Visitors:No    OBJECTIVE    Physical  VITALS:  /73   Pulse 65   Temp 97.5 °F (36.4 °C) (Oral)   Resp 18   Ht 5' 11\" (1.803 m)   Wt 207 lb 1.6 oz (93.9 kg)   SpO2 95%   BMI 28.88 kg/m²     Mental Status Examination:  Patients appearance was well-appearing, street clothes, and in chair. Thoughts are Obsessive. Homicidal ideations none. No abnormal movements, tics or mannerisms. Memory impaired immediate recall and recent memory Aims 0. Concentration Fair. Alert and oriented X 4. Insight and Judgement impaired insight. Patient was cooperative.  Patient gait normal. Mood irritable, affect agitation Hallucinations Absent, suicidal ideations no specific plan to harm self Speech normal pitch and normal volume    Data  Labs:   Admission on 01/28/2023   Component Date Value Ref Range Status    TSH 01/28/2023 3.30  0.27 - 4.20 uIU/mL Final    Cholesterol, Total 01/28/2023 196  0 - 199 mg/dL Final    Triglycerides 01/28/2023 66  0 - 150 mg/dL Final    HDL 01/28/2023 52  40 - 60 mg/dL Final    Comment: An HDL cholesterol less than 40 mg/dL is low and  constitutes a coronary heart disease risk factor. An HDL cholesterol greater than 60 mg/dL is a  negative risk factor for coronary heart disease. LDL Calculated 01/28/2023 131 (A)  <100 mg/dL Final    VLDL Cholesterol Calculated 01/28/2023 13  Not Established mg/dL Final    Hemoglobin A1C 01/28/2023 5.5  See comment % Final    Comment: Comment:  Diagnosis of Diabetes: > or = 6.5%  Increased risk of diabetes (Prediabetes): 5.7-6.4%  Glycemic Control: Nonpregnant Adults: <7.0%                    Pregnant: <6.0%        eAG 01/28/2023 111.2  mg/dL Final            Medications  Current Facility-Administered Medications: QUEtiapine (SEROQUEL) tablet 50 mg, 50 mg, Oral, BID  ibuprofen (ADVIL;MOTRIN) tablet 400 mg, 400 mg, Oral, Q6H PRN  lisinopril (PRINIVIL;ZESTRIL) tablet 10 mg, 10 mg, Oral, Daily  dorzolamide (TRUSOPT) 2 % ophthalmic solution 1 drop, 1 drop, Both Eyes, BID  magnesium hydroxide (MILK OF MAGNESIA) 400 MG/5ML suspension 30 mL, 30 mL, Oral, Daily PRN  nicotine polacrilex (COMMIT) lozenge 2 mg, 2 mg, Oral, Q1H PRN  aluminum & magnesium hydroxide-simethicone (MAALOX) 200-200-20 MG/5ML suspension 30 mL, 30 mL, Oral, Q6H PRN  hydrOXYzine HCl (ATARAX) tablet 50 mg, 50 mg, Oral, TID PRN  OLANZapine (ZYPREXA) tablet 5 mg, 5 mg, Oral, Q4H PRN **OR** OLANZapine (ZYPREXA) 10 mg in sterile water 2 mL injection, 10 mg, IntraMUSCular, Q4H PRN  diphenhydrAMINE (BENADRYL) injection 50 mg, 50 mg, IntraMUSCular, Q4H PRN  traZODone (DESYREL) tablet 50 mg, 50 mg, Oral, Nightly PRN  donepezil (ARICEPT) tablet 5 mg, 5 mg, Oral, Nightly  memantine (NAMENDA) tablet 5 mg, 5 mg, Oral, Daily  melatonin disintegrating tablet 5 mg, 5 mg, Oral, QPM    ASSESSMENT AND PLAN    Principal Problem:    Dementia with behavioral disturbance  Active Problems:    Closed left hip fracture, sequela    Hypertension, essential  Resolved Problems:    * No resolved hospital problems. *       1. Patient's symptoms   show no change  2. Probable discharge is Tuesday  3. Discharge planning is incomplete  4. Suicidal ideation is better  5. Total time with patient was 40 minutes and more than 50 % of that time was spent counseling the patient on their symptoms, treatment and expected goals.      Gallito Hyman, PMHNP-BC

## 2023-02-01 NOTE — PLAN OF CARE
Problem: Risk for Elopement  Goal: Patient will not exit the unit/facility without proper excort  1/31/2023 2122 by Ramesh Glover LPN  Outcome: Progressing  Flowsheets (Taken 1/31/2023 2109)  Nursing Interventions for Elopement Risk: Assist with personal care needs such as toileting, eating, dressing, as needed to reduce the risk of wandering   0 attempts of leaving unit. Q 15 minute checks continue. Problem: Anxiety  Goal: Will report anxiety at manageable levels  Description: INTERVENTIONS:  1. Administer medication as ordered  2. Teach and rehearse alternative coping skills  3. Provide emotional support with 1:1 interaction with staff  1/31/2023 2122 by Ramesh Glover LPN  Outcome: Progressing  Flowsheets (Taken 1/31/2023 2109)  Will report anxiety at manageable levels:   Administer medication as ordered   Teach and rehearse alternative coping skills   Provide emotional support with 1:1 interaction with staff   Patient calm and cooperative. + meds.    Problem: Pain  Goal: Verbalizes/displays adequate comfort level or baseline comfort level  1/31/2023 1111 by Kj Raygoza RN  Outcome: Not Progressing  Flowsheets (Taken 1/31/2023 1111)  Verbalizes/displays adequate comfort level or baseline comfort level: Assess pain using appropriate pain scale

## 2023-02-01 NOTE — FLOWSHEET NOTE
Senior Purposeful Rounding    Position:Sitting and Repositions Self    Physical Environment:Room free from clutter, Clear path to bathroom , Adequate lighting, and No safety hazards noted    Pain Rating/ Nonverbal Pain Behaviors:denies;      Patient Toileted:Independent

## 2023-02-01 NOTE — BH NOTE
Patient wanting to go home and asking for wallet and saying his hip hurts. Patient given 5 mg Zyprexa and 400mg of ibuprofen PRN.

## 2023-02-01 NOTE — PLAN OF CARE
Problem: Confusion  Goal: Confusion, delirium, dementia, or psychosis is improved or at baseline  Description: INTERVENTIONS:  1. Assess for possible contributors to thought disturbance, including medications, impaired vision or hearing, underlying metabolic abnormalities, dehydration, psychiatric diagnoses, and notify attending LIP  2. [place] high risk fall precautions, as indicated  3. Provide frequent short contacts to provide reality reorientation, refocusing and direction  4. Decrease environmental stimuli, including noise as appropriate  5. Monitor and intervene to maintain adequate nutrition, hydration, elimination, sleep and activity  6. If unable to ensure safety without constant attention obtain sitter and review sitter guidelines with assigned personnel  7. Initiate Psychosocial CNS and Spiritual Care consult, as indicated  Outcome: True Singh was visible in the milieu for much of the day. He was focused on his wallet and on going home for much of the day. He asked to see his wallet several times today. He also talked about checking on his house and his car. Morgan was compliant with care and medications this shift. He did not have any visitors or make any phone calls today. His daughter called twice and he talked with her once.

## 2023-02-02 PROBLEM — Z00.00 ENCOUNTER FOR GENERAL MEDICAL EXAMINATION: Status: ACTIVE | Noted: 2023-02-02

## 2023-02-02 PROCEDURE — 99232 SBSQ HOSP IP/OBS MODERATE 35: CPT

## 2023-02-02 PROCEDURE — 6370000000 HC RX 637 (ALT 250 FOR IP)

## 2023-02-02 PROCEDURE — 99231 SBSQ HOSP IP/OBS SF/LOW 25: CPT

## 2023-02-02 PROCEDURE — 6370000000 HC RX 637 (ALT 250 FOR IP): Performed by: NURSE PRACTITIONER

## 2023-02-02 PROCEDURE — 1240000000 HC EMOTIONAL WELLNESS R&B

## 2023-02-02 RX ORDER — QUETIAPINE FUMARATE 25 MG/1
50 TABLET, FILM COATED ORAL 2 TIMES DAILY
Status: DISCONTINUED | OUTPATIENT
Start: 2023-02-02 | End: 2023-02-07 | Stop reason: HOSPADM

## 2023-02-02 RX ORDER — MECOBALAMIN 5000 MCG
10 TABLET,DISINTEGRATING ORAL EVERY EVENING
Status: DISCONTINUED | OUTPATIENT
Start: 2023-02-02 | End: 2023-02-07 | Stop reason: HOSPADM

## 2023-02-02 RX ADMIN — DORZOLAMIDE HYDROCHLORIDE 1 DROP: 20 SOLUTION/ DROPS OPHTHALMIC at 08:15

## 2023-02-02 RX ADMIN — Medication 10 MG: at 17:01

## 2023-02-02 RX ADMIN — MEMANTINE 5 MG: 5 TABLET ORAL at 08:15

## 2023-02-02 RX ADMIN — DONEPEZIL HYDROCHLORIDE 5 MG: 5 TABLET, FILM COATED ORAL at 20:15

## 2023-02-02 RX ADMIN — QUETIAPINE FUMARATE 50 MG: 25 TABLET ORAL at 08:15

## 2023-02-02 RX ADMIN — LISINOPRIL 10 MG: 10 TABLET ORAL at 08:15

## 2023-02-02 RX ADMIN — QUETIAPINE FUMARATE 50 MG: 25 TABLET ORAL at 17:01

## 2023-02-02 RX ADMIN — DORZOLAMIDE HYDROCHLORIDE 1 DROP: 20 SOLUTION/ DROPS OPHTHALMIC at 20:15

## 2023-02-02 ASSESSMENT — PAIN DESCRIPTION - PAIN TYPE: TYPE: ACUTE PAIN

## 2023-02-02 ASSESSMENT — PAIN SCALES - GENERAL
PAINLEVEL_OUTOF10: 0
PAINLEVEL_OUTOF10: 3

## 2023-02-02 ASSESSMENT — PAIN - FUNCTIONAL ASSESSMENT: PAIN_FUNCTIONAL_ASSESSMENT: PREVENTS OR INTERFERES SOME ACTIVE ACTIVITIES AND ADLS

## 2023-02-02 ASSESSMENT — PAIN DESCRIPTION - ONSET: ONSET: ON-GOING

## 2023-02-02 ASSESSMENT — PAIN DESCRIPTION - DESCRIPTORS: DESCRIPTORS: ACHING

## 2023-02-02 ASSESSMENT — PAIN DESCRIPTION - LOCATION: LOCATION: HIP

## 2023-02-02 ASSESSMENT — PAIN DESCRIPTION - ORIENTATION: ORIENTATION: LEFT

## 2023-02-02 ASSESSMENT — PAIN DESCRIPTION - FREQUENCY: FREQUENCY: CONTINUOUS

## 2023-02-02 NOTE — FLOWSHEET NOTE
Senior Purposeful Rounding    Position:Repositions Self    Physical Environment:Room free from clutter, Clear path to bathroom , Adequate lighting, and No safety hazards noted    Pain Rating/ Nonverbal Pain Behaviors:0;      Pain interventions Attempted: na    Patient Toileted:Independent

## 2023-02-02 NOTE — PROGRESS NOTES
Group Therapy Note    Date: 2/2/2023  Start Time: 1000  End Time:  1100  Number of Participants: 3    Type of Group: Music Group    Notes:  Pt present for Music Group. Pt actively participated by making song selections and singing along to music. Participation Level:  Active Listener and Interactive    Participation Quality: Appropriate and Attentive      Speech:  normal      Affective Functioning: Congruent      Endings: None Reported    Modes of Intervention: Support, Socialization, Activity, and Media      Discipline Responsible: Chaplain Radha Serrano       02/02/23 1109   Encounter Summary   Encounter Overview/Reason  Behavioral Health   Service Provided For: Patient   Last Encounter    (2/2 Music Group)   Complexity of Encounter Moderate   Begin Time 1000   End Time  1100   Total Time Calculated 60 min   Behavioral Health    Type  Spirituality Group

## 2023-02-02 NOTE — PROGRESS NOTES
Department of Psychiatry  AttendingProgress Note  Chief Complaint: Dementia with behavioral disturbance    Patient's chart was reviewed and collaborated with  about the treatment plan. SUBJECTIVE:    Pt remains confused, MOCA 15/30. He was in groups today, singing with peers. Pt had some increased agitation last night. Able to sleep 6.5 hours. Pt calm this morning, usually with increased behaviors in late afternoon. Will adjust medication schedule. Family preparing for him to come home next Tuesday with home health support. Plan:  1/30: Increase Seroquel to 50mg BID  2/2: Increase Melatonin to 10mg every evening, change seroquel to evenings as well. Patient is feeling unchanged. Suicidal ideation:  denies suicidal ideation. Patient does not have medication side effects. ROS: Patient has new complaints: no  Sleeping adequately:  No:    Appetite adequate: Yes  Attending groups: No:   Visitors:No    OBJECTIVE    Physical  VITALS:  BP (!) 152/68   Pulse 60   Temp 97.8 °F (36.6 °C) (Temporal)   Resp 18   Ht 5' 11\" (1.803 m)   Wt 207 lb 1.6 oz (93.9 kg)   SpO2 99%   BMI 28.88 kg/m²     Mental Status Examination:  Patients appearance was well-appearing, street clothes, and in chair. Thoughts are Obsessive. Homicidal ideations none. No abnormal movements, tics or mannerisms. Memory impaired immediate recall and recent memory Aims 0. Concentration Fair. Alert and oriented X 4. Insight and Judgement impaired insight. Patient was cooperative.  Patient gait normal. Mood irritable, affect agitation Hallucinations Absent, suicidal ideations no specific plan to harm self Speech normal pitch and normal volume    Data  Labs:   Admission on 01/28/2023   Component Date Value Ref Range Status    TSH 01/28/2023 3.30  0.27 - 4.20 uIU/mL Final    Cholesterol, Total 01/28/2023 196  0 - 199 mg/dL Final    Triglycerides 01/28/2023 66  0 - 150 mg/dL Final    HDL 01/28/2023 52  40 - 60 mg/dL Final    Comment: An HDL cholesterol less than 40 mg/dL is low and  constitutes a coronary heart disease risk factor.  An HDL cholesterol greater than 60 mg/dL is a  negative risk factor for coronary heart disease.      LDL Calculated 01/28/2023 131 (A)  <100 mg/dL Final    VLDL Cholesterol Calculated 01/28/2023 13  Not Established mg/dL Final    Hemoglobin A1C 01/28/2023 5.5  See comment % Final    Comment: Comment:  Diagnosis of Diabetes: > or = 6.5%  Increased risk of diabetes (Prediabetes): 5.7-6.4%  Glycemic Control: Nonpregnant Adults: <7.0%                    Pregnant: <6.0%        eAG 01/28/2023 111.2  mg/dL Final            Medications  Current Facility-Administered Medications: melatonin disintegrating tablet 10 mg, 10 mg, Oral, QPM  QUEtiapine (SEROQUEL) tablet 50 mg, 50 mg, Oral, BID  ibuprofen (ADVIL;MOTRIN) tablet 400 mg, 400 mg, Oral, Q6H PRN  lisinopril (PRINIVIL;ZESTRIL) tablet 10 mg, 10 mg, Oral, Daily  dorzolamide (TRUSOPT) 2 % ophthalmic solution 1 drop, 1 drop, Both Eyes, BID  magnesium hydroxide (MILK OF MAGNESIA) 400 MG/5ML suspension 30 mL, 30 mL, Oral, Daily PRN  nicotine polacrilex (COMMIT) lozenge 2 mg, 2 mg, Oral, Q1H PRN  aluminum & magnesium hydroxide-simethicone (MAALOX) 200-200-20 MG/5ML suspension 30 mL, 30 mL, Oral, Q6H PRN  hydrOXYzine HCl (ATARAX) tablet 50 mg, 50 mg, Oral, TID PRN  OLANZapine (ZYPREXA) tablet 5 mg, 5 mg, Oral, Q4H PRN **OR** OLANZapine (ZYPREXA) 10 mg in sterile water 2 mL injection, 10 mg, IntraMUSCular, Q4H PRN  diphenhydrAMINE (BENADRYL) injection 50 mg, 50 mg, IntraMUSCular, Q4H PRN  traZODone (DESYREL) tablet 50 mg, 50 mg, Oral, Nightly PRN  donepezil (ARICEPT) tablet 5 mg, 5 mg, Oral, Nightly  memantine (NAMENDA) tablet 5 mg, 5 mg, Oral, Daily    ASSESSMENT AND PLAN    Principal Problem:    Dementia with behavioral disturbance  Active Problems:    Closed left hip fracture, sequela    Encounter for general medical examination    Hypertension, essential  Resolved  Problems:    * No resolved hospital problems. *       1. Patient's symptoms   show no change  2. Probable discharge is Tuesday  3. Discharge planning is incomplete  4. Suicidal ideation is better  5. Total time with patient was 40 minutes and more than 50 % of that time was spent counseling the patient on their symptoms, treatment and expected goals.      Chelsea Edmonds, PMHNP-BC

## 2023-02-02 NOTE — GROUP NOTE
Group Therapy Note    Date: 2/2/2023    Group Start Time: 1100  Group End Time: 4770  Group Topic: Activity    [address] [facility]        Group Therapy Note    Group members collaborated in Handy, compiling a list of tv shows, one show for each letter of the alphabet. Reminiscence throughout on various tv shows, discussing characters, theme songs, etc.    Attendees: 3       Notes:  Present and engaged during gameplay. Ana Tilley was collaborative and engaged with peers, staff, and nursing students throughout.     Status After Intervention:  Unchanged    Participation Level: Interactive    Participation Quality: Appropriate and Attentive      Speech:  normal      Thought Process/Content: Logical      Affective Functioning: Congruent      Mood: euthymic      Level of consciousness:  Attentive      Response to Learning: Progressing to goal      Endings: None Reported    Modes of Intervention: Activity, Media, and Reality-testing      Discipline Responsible: Psychoeducational Specialist      Signature:  Celi Menon MM, MT-BC

## 2023-02-02 NOTE — PLAN OF CARE
Problem: Risk for Elopement  Goal: Patient will not exit the unit/facility without proper excort  Outcome: Progressing     Problem: Anxiety  Goal: Will report anxiety at manageable levels  Description: INTERVENTIONS:  1. Administer medication as ordered  2. Teach and rehearse alternative coping skills  3. Provide emotional support with 1:1 interaction with staff  Outcome: Progressing    Pt has been visible on the unit. He has increased in anxiety as the shift progresses. He has been asking for his wallet, his keys, and where his car is. He has been coming up to the desk multiple times and asking the same questions. Pt can be difficult to redirect due to his short term memory loss. He was medication compliant. He accepted snacks and drinks. He is currently sitting in the DR watching tv.  Ibuprofen given at 2125 for left hip pain

## 2023-02-02 NOTE — PROGRESS NOTES
Brief Progress Note    Date: 02/02/23    Subjective: Patient seen eating snack at table. He repeatedly asks why he is here and when he will be going home. He denies any current medical concerns. Objective:  Vitals:    02/01/23 1501 02/01/23 1930 02/02/23 0325 02/02/23 0800   BP: (!) 149/81 (!) 143/94 134/61 (!) 152/68   Pulse: 69 68 65 60   Resp: 16 18 18 18   Temp:  98.4 °F (36.9 °C) 97.6 °F (36.4 °C) 97.8 °F (36.6 °C)   TempSrc: Temporal Oral Oral Temporal   SpO2: 99% 94% 96% 99%   Weight:       Height:           Physical Exam:  Gen: No distress. Alert. Eyes: PERRL. No sclera icterus. No conjunctival injection. Neck: No JVD. Trachea midline. Resp: No accessory muscle use. No crackles. No wheezes. No rhonchi. CV: Regular rate. Regular rhythm. No murmur. No rub. No edema. Skin: Warm and dry. No nodule on exposed extremities. No rash on exposed extremities. M/S: No cyanosis. No joint deformity. No clubbing. Neuro: Awake.  Grossly nonfocal    Psych: Per psychiatry team        Assessment & Plan:    #Dementia  -management per psychiatry    #Left greater trochanter fracture  -PT/OT  -walking with a walker  -to Follow up with [facility] in 1 month     #Hypertension   -BP currently stable  -continue Lisinopril      Maggie Cuadra PA-C  2/2/2023  2:01 PM

## 2023-02-03 PROCEDURE — 6370000000 HC RX 637 (ALT 250 FOR IP): Performed by: NURSE PRACTITIONER

## 2023-02-03 PROCEDURE — 1240000000 HC EMOTIONAL WELLNESS R&B

## 2023-02-03 PROCEDURE — 6370000000 HC RX 637 (ALT 250 FOR IP)

## 2023-02-03 RX ADMIN — QUETIAPINE FUMARATE 50 MG: 25 TABLET ORAL at 08:19

## 2023-02-03 RX ADMIN — LISINOPRIL 10 MG: 10 TABLET ORAL at 08:18

## 2023-02-03 RX ADMIN — DORZOLAMIDE HYDROCHLORIDE 1 DROP: 20 SOLUTION/ DROPS OPHTHALMIC at 08:20

## 2023-02-03 RX ADMIN — DONEPEZIL HYDROCHLORIDE 5 MG: 5 TABLET, FILM COATED ORAL at 20:36

## 2023-02-03 RX ADMIN — QUETIAPINE FUMARATE 50 MG: 25 TABLET ORAL at 17:12

## 2023-02-03 RX ADMIN — DORZOLAMIDE HYDROCHLORIDE 1 DROP: 20 SOLUTION/ DROPS OPHTHALMIC at 20:39

## 2023-02-03 RX ADMIN — Medication 10 MG: at 17:12

## 2023-02-03 RX ADMIN — MEMANTINE 5 MG: 5 TABLET ORAL at 08:18

## 2023-02-03 NOTE — BH NOTE
Barak Vazquez awoke and was irritable this morning. Focused on leaving and demanding that he be discharged right now. He was made aware that he will discharge on Tuesday after his daughter prepares her home for him. Although upset, he was able to be redirected. Participated in group this morning. Has been visible on the unit but stays primarily to self. Good appetite. Declines needs other than the need discharge.

## 2023-02-03 NOTE — BH NOTE
Patient beginning to perseverate on wallet and the need to see the contents of the wallet. Reminded he will receive this at discharge and that his discharge is on Tuesday. Although upset he could not see his wallet, patient returned to [facility] area to watch TV.

## 2023-02-03 NOTE — GROUP NOTE
Group Therapy Note    Date: 2/3/2023    Group Start Time: 1300  Group End Time: 1351  Group Topic: Cognitive Skills    [place] [place] [address]        Group Therapy Note    Attendees: 6    Facilitator met with patients individually to reminisce about life stories for the purpose of a unit newsletter. Patients were explained that information would be used to create a newsletter but that personal, identifying information would not be shared. Patients were asked questions about their hobbies, favorite memories, life achievements, careers, and life highlights. Patient's Goal:  Patient will be able to share an accomplishment or life experience and be able to share specific, concrete details about the events. Notes:  Patient was able to respond to open-ended questions about life experiences and shared stories about life accomplishments    Status After Intervention:  Improved    Participation Level:  Active Listener and Interactive    Participation Quality: Appropriate and Sharing      Speech:  normal      Thought Process/Content: Logical  Linear      Affective Functioning: Congruent      Mood: euthymic      Level of consciousness:  Alert, Oriented x4, and Attentive      Response to Learning: Able to verbalize current knowledge/experience, Able to change behavior, and Progressing to goal      Endings: None Reported    Modes of Intervention: Socialization and Exploration      Discipline Responsible: /Counselor      Signature:  LELAND Vu

## 2023-02-03 NOTE — PROGRESS NOTES
Department of Psychiatry  AttendingProgress Note  Chief Complaint: Dementia with behavioral disturbance    Patient's chart was reviewed and collaborated with  about the treatment plan. SUBJECTIVE:    Pt up in common area, irritable, wants to leave. We talked about Tuesday being his day for discharge, asked me what today was. We went over this information several times. He states he does not like the food here, giving me a thumbs down when talking about how it tastes. Pt remains confused. Irritable but calm, easily redirected in conversation. Plan:  1/30: Increase Seroquel to 50mg BID  2/2: Increase Melatonin to 10mg every evening, change seroquel to evenings as well. Patient is feeling unchanged. Suicidal ideation:  denies suicidal ideation. Patient does not have medication side effects. ROS: Patient has new complaints: no  Sleeping adequately:  No:    Appetite adequate: Yes  Attending groups: No:   Visitors:No    OBJECTIVE    Physical  VITALS:  /72   Pulse 72   Temp 97.8 °F (36.6 °C) (Oral)   Resp 16   Ht 5' 11\" (1.803 m)   Wt 207 lb 1.6 oz (93.9 kg)   SpO2 97%   BMI 28.88 kg/m²     Mental Status Examination:  Patients appearance was well-appearing, street clothes, and in chair. Thoughts are Obsessive. Homicidal ideations none. No abnormal movements, tics or mannerisms. Memory impaired immediate recall and recent memory Aims 0. Concentration Fair. Alert and oriented X 4. Insight and Judgement impaired insight. Patient was cooperative.  Patient gait normal. Mood irritable, affect agitation Hallucinations Absent, suicidal ideations no specific plan to harm self Speech normal pitch and normal volume    Data  Labs:   Admission on 01/28/2023   Component Date Value Ref Range Status    TSH 01/28/2023 3.30  0.27 - 4.20 uIU/mL Final    Cholesterol, Total 01/28/2023 196  0 - 199 mg/dL Final    Triglycerides 01/28/2023 66  0 - 150 mg/dL Final    HDL 01/28/2023 52  40 - 60 mg/dL Final    Comment: An HDL cholesterol less than 40 mg/dL is low and  constitutes a coronary heart disease risk factor. An HDL cholesterol greater than 60 mg/dL is a  negative risk factor for coronary heart disease.       LDL Calculated 01/28/2023 131 (A)  <100 mg/dL Final    VLDL Cholesterol Calculated 01/28/2023 13  Not Established mg/dL Final    Hemoglobin A1C 01/28/2023 5.5  See comment % Final    Comment: Comment:  Diagnosis of Diabetes: > or = 6.5%  Increased risk of diabetes (Prediabetes): 5.7-6.4%  Glycemic Control: Nonpregnant Adults: <7.0%                    Pregnant: <6.0%        eAG 01/28/2023 111.2  mg/dL Final            Medications  Current Facility-Administered Medications: melatonin disintegrating tablet 10 mg, 10 mg, Oral, QPM  QUEtiapine (SEROQUEL) tablet 50 mg, 50 mg, Oral, BID  ibuprofen (ADVIL;MOTRIN) tablet 400 mg, 400 mg, Oral, Q6H PRN  lisinopril (PRINIVIL;ZESTRIL) tablet 10 mg, 10 mg, Oral, Daily  dorzolamide (TRUSOPT) 2 % ophthalmic solution 1 drop, 1 drop, Both Eyes, BID  magnesium hydroxide (MILK OF MAGNESIA) 400 MG/5ML suspension 30 mL, 30 mL, Oral, Daily PRN  nicotine polacrilex (COMMIT) lozenge 2 mg, 2 mg, Oral, Q1H PRN  aluminum & magnesium hydroxide-simethicone (MAALOX) 200-200-20 MG/5ML suspension 30 mL, 30 mL, Oral, Q6H PRN  hydrOXYzine HCl (ATARAX) tablet 50 mg, 50 mg, Oral, TID PRN  OLANZapine (ZYPREXA) tablet 5 mg, 5 mg, Oral, Q4H PRN **OR** OLANZapine (ZYPREXA) 10 mg in sterile water 2 mL injection, 10 mg, IntraMUSCular, Q4H PRN  diphenhydrAMINE (BENADRYL) injection 50 mg, 50 mg, IntraMUSCular, Q4H PRN  traZODone (DESYREL) tablet 50 mg, 50 mg, Oral, Nightly PRN  donepezil (ARICEPT) tablet 5 mg, 5 mg, Oral, Nightly  memantine (NAMENDA) tablet 5 mg, 5 mg, Oral, Daily    ASSESSMENT AND PLAN    Principal Problem:    Dementia with behavioral disturbance  Active Problems:    Closed left hip fracture, sequela    Encounter for general medical examination    Hypertension, essential  Resolved Problems:    * No resolved hospital problems. *       1. Patient's symptoms   show no change  2. Probable discharge is Tuesday  3. Discharge planning is incomplete  4. Suicidal ideation is better  5. Total time with patient was 40 minutes and more than 50 % of that time was spent counseling the patient on their symptoms, treatment and expected goals.      Tim Ng, JEFFP-BC

## 2023-02-03 NOTE — PLAN OF CARE
Problem: Risk for Elopement  Goal: Patient will not exit the unit/facility without proper excort  Outcome: Progressing     Problem: Behavior  Goal: Pt/Family maintain appropriate behavior and adhere to behavioral management agreement, if implemented  Description: INTERVENTIONS:  1. Assess patient/family's coping skills and  non-compliant behavior (including use of illegal substances)  2. Notify security of behavior or suspected illegal substances which indicate the need for search of the family and/or belongings  3. Encourage verbalization of thoughts and concerns in a socially appropriate manner  4. Utilize positive, consistent limit setting strategies supporting safety of patient, staff and others  5. Encourage participation in the decision making process about the behavioral management agreement  6. If a visitor's behavior poses a threat to safety call refer to organization policy. 7. Initiate consult with , Psychosocial CNS, Spiritual Care as appropriate  Outcome: Progressing    Pt has been visible on the unit. Pt has only asked about his belongings twice and was easily redirected. He has been sitting in the DR watching tv. He was pleasant and cooperative with staff. He has been ambulating with his walker. He ate snack/drinks. Denies SI/HI/AVH and no RTIS noted. Offered iburofen for hip pain but he declined.  Denies needs currently

## 2023-02-03 NOTE — BH NOTE
Patients daughter meeting with patient on the unit. Fab Nicole, patients daughter brought POA paperwork to the unit.

## 2023-02-03 NOTE — PLAN OF CARE
Problem: Safety - Adult  Goal: Free from fall injury  Outcome: Progressing     Problem: ABCDS Injury Assessment  Goal: Absence of physical injury  Outcome: Progressing       Problem: Risk for Elopement  Goal: Patient will not exit the unit/facility without proper excort  Outcome: Progressing     Problem: Behavior  Goal: Pt/Family maintain appropriate behavior and adhere to behavioral management agreement, if implemented  Description: INTERVENTIONS:  1. Assess patient/family's coping skills and  non-compliant behavior (including use of illegal substances)  2. Notify security of behavior or suspected illegal substances which indicate the need for search of the family and/or belongings  3. Encourage verbalization of thoughts and concerns in a socially appropriate manner  4. Utilize positive, consistent limit setting strategies supporting safety of patient, staff and others  5. Encourage participation in the decision making process about the behavioral management agreement  6. If a visitor's behavior poses a threat to safety call refer to organization policy. 7. Initiate consult with , Psychosocial CNS, Spiritual Care as appropriate  Outcome: Progressing     Problem: Pain  Goal: Verbalizes/displays adequate comfort level or baseline comfort level  Outcome: Progressing     Problem: Confusion  Goal: Confusion, delirium, dementia, or psychosis is improved or at baseline  Description: INTERVENTIONS:  1. Assess for possible contributors to thought disturbance, including medications, impaired vision or hearing, underlying metabolic abnormalities, dehydration, psychiatric diagnoses, and notify attending LIP  2. [place] high risk fall precautions, as indicated  3. Provide frequent short contacts to provide reality reorientation, refocusing and direction  4. Decrease environmental stimuli, including noise as appropriate  5.  Monitor and intervene to maintain adequate nutrition, hydration, elimination, sleep and activity  6. If unable to ensure safety without constant attention obtain sitter and review sitter guidelines with assigned personnel  7. Initiate Psychosocial CNS and Spiritual Care consult, as indicated  Outcome: Progressing     Problem: Anxiety  Goal: Will report anxiety at manageable levels  Description: INTERVENTIONS:  1. Administer medication as ordered  2. Teach and rehearse alternative coping skills  3.  Provide emotional support with 1:1 interaction with staff  Outcome: Progressing

## 2023-02-04 PROCEDURE — 1240000000 HC EMOTIONAL WELLNESS R&B

## 2023-02-04 PROCEDURE — 6370000000 HC RX 637 (ALT 250 FOR IP)

## 2023-02-04 PROCEDURE — 6370000000 HC RX 637 (ALT 250 FOR IP): Performed by: NURSE PRACTITIONER

## 2023-02-04 RX ADMIN — QUETIAPINE FUMARATE 50 MG: 25 TABLET ORAL at 10:35

## 2023-02-04 RX ADMIN — QUETIAPINE FUMARATE 50 MG: 25 TABLET ORAL at 17:07

## 2023-02-04 RX ADMIN — LISINOPRIL 10 MG: 10 TABLET ORAL at 10:35

## 2023-02-04 RX ADMIN — MEMANTINE 5 MG: 5 TABLET ORAL at 10:35

## 2023-02-04 RX ADMIN — DORZOLAMIDE HYDROCHLORIDE 1 DROP: 20 SOLUTION/ DROPS OPHTHALMIC at 10:42

## 2023-02-04 RX ADMIN — Medication 10 MG: at 17:07

## 2023-02-04 NOTE — FLOWSHEET NOTE
Senior Purposeful Rounding    Position:Sitting    Physical Environment:Room free from clutter, Clear path to bathroom , Adequate lighting, and No safety hazards noted    Pain Rating/ Nonverbal Pain Behaviors:0    Pain interventions Attempted: na    Patient Toileted:Continent and Independent

## 2023-02-04 NOTE — PROGRESS NOTES
Patient calm and cooperative. Patient alert and oriented to person only. Patient independent with walker. Able to reposition self. Room is free from clutter and debris. Bathroom has a clear pathway to it.

## 2023-02-04 NOTE — FLOWSHEET NOTE
Senior Purposeful Rounding    Position:Repositions Self    Physical Environment:Room free from clutter, Clear path to bathroom , Adequate lighting, and No safety hazards noted    Pain Rating/ Nonverbal Pain Behaviors:0    Pain interventions Attempted: na    Patient Toileted:No- Void

## 2023-02-04 NOTE — PROGRESS NOTES
Department of Psychiatry  Attending Progress Note    Chief Complaint: Dementia with behavioral disturbance    Patient's chart was reviewed and collaborated with  about the treatment plan. SUBJECTIVE:        Pt sitting in common area with other pts. Helping to aid in calming down other pts. Telling one pt \"it's okay, just take a breath, okay, okay, you're safe\". Plan:  1/30: Increase Seroquel to 50mg BID  2/2: Increase Melatonin to 10mg every evening, change seroquel to evenings as well. Patient is feeling unchanged. Suicidal ideation:  denies suicidal ideation. Patient does not have medication side effects. ROS: Patient has new complaints: no  Sleeping adequately:  No:    Appetite adequate: Yes  Attending groups: No:   Visitors:No    OBJECTIVE    Physical  VITALS:  BP (!) 154/80   Pulse 58   Temp 98.2 °F (36.8 °C) (Oral)   Resp 16   Ht 5' 11\" (1.803 m)   Wt 207 lb 1.6 oz (93.9 kg)   SpO2 96%   BMI 28.88 kg/m²     Mental Status Examination:  Patients appearance was well-appearing, street clothes, and in chair. Thoughts are Obsessive. Homicidal ideations none. No abnormal movements, tics or mannerisms. Memory impaired immediate recall and recent memory Aims 0. Concentration Fair. Alert and oriented X 4. Insight and Judgement impaired insight. Patient was cooperative. Patient gait normal. Mood irritable, affect agitation Hallucinations Absent, suicidal ideations no specific plan to harm self Speech normal pitch and normal volume    Data  Labs:   Admission on 01/28/2023   Component Date Value Ref Range Status    TSH 01/28/2023 3.30  0.27 - 4.20 uIU/mL Final    Cholesterol, Total 01/28/2023 196  0 - 199 mg/dL Final    Triglycerides 01/28/2023 66  0 - 150 mg/dL Final    HDL 01/28/2023 52  40 - 60 mg/dL Final    Comment: An HDL cholesterol less than 40 mg/dL is low and  constitutes a coronary heart disease risk factor.   An HDL cholesterol greater than 60 mg/dL is a  negative risk factor for coronary heart disease. LDL Calculated 01/28/2023 131 (A)  <100 mg/dL Final    VLDL Cholesterol Calculated 01/28/2023 13  Not Established mg/dL Final    Hemoglobin A1C 01/28/2023 5.5  See comment % Final    Comment: Comment:  Diagnosis of Diabetes: > or = 6.5%  Increased risk of diabetes (Prediabetes): 5.7-6.4%  Glycemic Control: Nonpregnant Adults: <7.0%                    Pregnant: <6.0%        eAG 01/28/2023 111.2  mg/dL Final            Medications  Current Facility-Administered Medications: melatonin disintegrating tablet 10 mg, 10 mg, Oral, QPM  QUEtiapine (SEROQUEL) tablet 50 mg, 50 mg, Oral, BID  ibuprofen (ADVIL;MOTRIN) tablet 400 mg, 400 mg, Oral, Q6H PRN  lisinopril (PRINIVIL;ZESTRIL) tablet 10 mg, 10 mg, Oral, Daily  dorzolamide (TRUSOPT) 2 % ophthalmic solution 1 drop, 1 drop, Both Eyes, BID  magnesium hydroxide (MILK OF MAGNESIA) 400 MG/5ML suspension 30 mL, 30 mL, Oral, Daily PRN  nicotine polacrilex (COMMIT) lozenge 2 mg, 2 mg, Oral, Q1H PRN  aluminum & magnesium hydroxide-simethicone (MAALOX) 200-200-20 MG/5ML suspension 30 mL, 30 mL, Oral, Q6H PRN  hydrOXYzine HCl (ATARAX) tablet 50 mg, 50 mg, Oral, TID PRN  OLANZapine (ZYPREXA) tablet 5 mg, 5 mg, Oral, Q4H PRN **OR** OLANZapine (ZYPREXA) 10 mg in sterile water 2 mL injection, 10 mg, IntraMUSCular, Q4H PRN  diphenhydrAMINE (BENADRYL) injection 50 mg, 50 mg, IntraMUSCular, Q4H PRN  traZODone (DESYREL) tablet 50 mg, 50 mg, Oral, Nightly PRN  donepezil (ARICEPT) tablet 5 mg, 5 mg, Oral, Nightly  memantine (NAMENDA) tablet 5 mg, 5 mg, Oral, Daily    ASSESSMENT AND PLAN    Principal Problem:    Dementia with behavioral disturbance  Active Problems:    Closed left hip fracture, sequela    Encounter for general medical examination    Hypertension, essential  Resolved Problems:    * No resolved hospital problems. *       1. Patient's symptoms   show no change  2. Probable discharge is Tuesday  3. Discharge planning is incomplete  4.  Suicidal ideation is better  5. Total time with patient was 40 minutes and more than 50 % of that time was spent counseling the patient on their symptoms, treatment and expected goals. Addendum to PA student note:  Pt seen, examined, and evaluated with PA student, Sanam Cormier, PA-S2, who acted as my scribe for the above documentation. I have reviewed the current history, physical findings, labs, assessment and plan; and agree with note as documented.      Carol Berumen MD  Physician Psychiatry

## 2023-02-04 NOTE — PROGRESS NOTES
Patient sleeping at shift change. Respirations easy. Patient is able to reposition self. Patient independent with gait using a walker for assistance. There is a clear pathway to the bathroom and room remains free from clutter and debris.

## 2023-02-04 NOTE — PLAN OF CARE
Problem: Risk for Elopement  Goal: Patient will not exit the unit/facility without proper excort  2/3/2023 2126 by Prashant Lindsay LPN  Outcome: Progressing     Problem: Confusion  Goal: Confusion, delirium, dementia, or psychosis is improved or at baseline  Description: INTERVENTIONS:  1. Assess for possible contributors to thought disturbance, including medications, impaired vision or hearing, underlying metabolic abnormalities, dehydration, psychiatric diagnoses, and notify attending LIP  2. [place] high risk fall precautions, as indicated  3. Provide frequent short contacts to provide reality reorientation, refocusing and direction  4. Decrease environmental stimuli, including noise as appropriate  5. Monitor and intervene to maintain adequate nutrition, hydration, elimination, sleep and activity  6. If unable to ensure safety without constant attention obtain sitter and review sitter guidelines with assigned personnel  7. Initiate Psychosocial CNS and Spiritual Care consult, as indicated  2/3/2023 2126 by Prashant Lindsay LPN  Outcome: Progressing   Patient is seen out on the unit, social with staff when he is approached. Up to the nurses station off and on asking about his wallet. Alert to self only this evening. Patient does seem less anxious than previous. No attempts to exit floor. Cooperative with staff and with care this evening. Will continue to monitor patient through shift for symptoms and safety.

## 2023-02-04 NOTE — PROGRESS NOTES
Behavioral Services                                              Medicare Re-Certification    I certify that the inpatient psychiatric hospital services furnished since the previous certification/re-certification were, and continue to be, medically necessary for;    [x] (1) Treatment which could reasonably be expected to improve the patient's condition,    [x] (2) Or for diagnostic study. Estimated length of stay/service 5 d    Plan for post-hospital care outpt    This patient continues to need, on a daily basis, active treatment furnished directly by or requiring the supervision of inpatient psychiatric personnel.     Electronically signed by Lauren Lau MD on 2/4/2023 at 8:04 AM

## 2023-02-04 NOTE — FLOWSHEET NOTE
Senior Purposeful Rounding    Position:Sitting    Physical Environment:Room free from clutter, Clear path to bathroom , Adequate lighting, and No safety hazards noted    Pain Rating/ Nonverbal Pain Behaviors:0    Pain interventions Attempted: na    Patient Toileted:Independent

## 2023-02-05 PROCEDURE — 6370000000 HC RX 637 (ALT 250 FOR IP): Performed by: NURSE PRACTITIONER

## 2023-02-05 PROCEDURE — 1240000000 HC EMOTIONAL WELLNESS R&B

## 2023-02-05 PROCEDURE — 6370000000 HC RX 637 (ALT 250 FOR IP)

## 2023-02-05 RX ADMIN — TRAZODONE HYDROCHLORIDE 50 MG: 50 TABLET ORAL at 01:00

## 2023-02-05 RX ADMIN — DONEPEZIL HYDROCHLORIDE 5 MG: 5 TABLET, FILM COATED ORAL at 21:13

## 2023-02-05 RX ADMIN — QUETIAPINE FUMARATE 50 MG: 25 TABLET ORAL at 09:59

## 2023-02-05 RX ADMIN — QUETIAPINE FUMARATE 50 MG: 25 TABLET ORAL at 17:30

## 2023-02-05 RX ADMIN — DORZOLAMIDE HYDROCHLORIDE 1 DROP: 20 SOLUTION/ DROPS OPHTHALMIC at 10:01

## 2023-02-05 RX ADMIN — Medication 10 MG: at 17:30

## 2023-02-05 RX ADMIN — MEMANTINE 5 MG: 5 TABLET ORAL at 09:59

## 2023-02-05 RX ADMIN — TRAZODONE HYDROCHLORIDE 50 MG: 50 TABLET ORAL at 21:12

## 2023-02-05 RX ADMIN — LISINOPRIL 10 MG: 10 TABLET ORAL at 09:59

## 2023-02-05 NOTE — PLAN OF CARE
Problem: Safety - Adult  Goal: Free from fall injury  Outcome: Progressing     Problem: ABCDS Injury Assessment  Goal: Absence of physical injury  Outcome: Progressing     Problem: Risk for Elopement  Goal: Patient will not exit the unit/facility without proper excort  Outcome: Progressing     Problem: Behavior  Goal: Pt/Family maintain appropriate behavior and adhere to behavioral management agreement, if implemented  Description: INTERVENTIONS:  1. Assess patient/family's coping skills and  non-compliant behavior (including use of illegal substances)  2. Notify security of behavior or suspected illegal substances which indicate the need for search of the family and/or belongings  3. Encourage verbalization of thoughts and concerns in a socially appropriate manner  4. Utilize positive, consistent limit setting strategies supporting safety of patient, staff and others  5. Encourage participation in the decision making process about the behavioral management agreement  6. If a visitor's behavior poses a threat to safety call refer to organization policy. 7. Initiate consult with , Psychosocial CNS, Spiritual Care as appropriate  Outcome: Progressing     Problem: Anxiety  Goal: Will report anxiety at manageable levels  Description: INTERVENTIONS:  1. Administer medication as ordered  2. Teach and rehearse alternative coping skills  3. Provide emotional support with 1:1 interaction with staff  Outcome: Progressing     Problem: Confusion  Goal: Confusion, delirium, dementia, or psychosis is improved or at baseline  Description: INTERVENTIONS:  1. Assess for possible contributors to thought disturbance, including medications, impaired vision or hearing, underlying metabolic abnormalities, dehydration, psychiatric diagnoses, and notify attending LIP  2. [place] high risk fall precautions, as indicated  3. Provide frequent short contacts to provide reality reorientation, refocusing and direction  4. Decrease environmental stimuli, including noise as appropriate  5. Monitor and intervene to maintain adequate nutrition, hydration, elimination, sleep and activity  6. If unable to ensure safety without constant attention obtain sitter and review sitter guidelines with assigned personnel  7. Initiate Psychosocial CNS and Spiritual Care consult, as indicated  Outcome: Progressing     Problem: Pain  Goal: Verbalizes/displays adequate comfort level or baseline comfort level  Outcome: Progressing     Problem: Skin/Tissue Integrity  Goal: Absence of new skin breakdown  Description: 1. Monitor for areas of redness and/or skin breakdown  2. Assess vascular access sites hourly  3. Every 4-6 hours minimum:  Change oxygen saturation probe site  4. Every 4-6 hours:  If on nasal continuous positive airway pressure, respiratory therapy assess nares and determine need for appliance change or resting period. Outcome: Progressing     2/5/23 @ [id number]  Pt makes progress on goals. Safety maintained, skin integrity also maintained. Anxiety held at manageable levels and pt's confusion remains at baseline. Will cont to monitor.

## 2023-02-05 NOTE — PROGRESS NOTES
Pt showered with assistance today. Compliant with medications and all care. No agitation noted today. Pt denies desire to self/other harm, denies AVH. No RTIS noted.

## 2023-02-05 NOTE — PLAN OF CARE
Problem: Safety - Adult  Goal: Free from fall injury  2/5/2023 1634 by Miroslava Wang RN  Outcome: Progressing  2/5/2023 1411 by Miroslava Wang RN  Outcome: Progressing     Problem: ABCDS Injury Assessment  Goal: Absence of physical injury  2/5/2023 1634 by Miroslava Wang RN  Outcome: Progressing  2/5/2023 1411 by Miroslava Wang RN  Outcome: Progressing     Problem: Risk for Elopement  Goal: Patient will not exit the unit/facility without proper excort  2/5/2023 1634 by Miroslava Wang RN  Outcome: Progressing  2/5/2023 1411 by Miroslava Wang RN  Outcome: Progressing     Problem: Behavior  Goal: Pt/Family maintain appropriate behavior and adhere to behavioral management agreement, if implemented  Description: INTERVENTIONS:  1. Assess patient/family's coping skills and  non-compliant behavior (including use of illegal substances)  2. Notify security of behavior or suspected illegal substances which indicate the need for search of the family and/or belongings  3. Encourage verbalization of thoughts and concerns in a socially appropriate manner  4. Utilize positive, consistent limit setting strategies supporting safety of patient, staff and others  5. Encourage participation in the decision making process about the behavioral management agreement  6. If a visitor's behavior poses a threat to safety call refer to organization policy. 7. Initiate consult with , Psychosocial CNS, Spiritual Care as appropriate  2/5/2023 1634 by Miroslava Wang RN  Outcome: Progressing  2/5/2023 1411 by Miroslava Wang RN  Outcome: Progressing     Problem: Anxiety  Goal: Will report anxiety at manageable levels  Description: INTERVENTIONS:  1. Administer medication as ordered  2. Teach and rehearse alternative coping skills  3.  Provide emotional support with 1:1 interaction with staff  2/5/2023 1634 by Miroslava Wang RN  Outcome: Progressing  2/5/2023 1411 by Miroslava Wang RN  Outcome: Progressing     Problem: Confusion  Goal: Confusion, delirium, dementia, or psychosis is improved or at baseline  Description: INTERVENTIONS:  1. Assess for possible contributors to thought disturbance, including medications, impaired vision or hearing, underlying metabolic abnormalities, dehydration, psychiatric diagnoses, and notify attending LIP  2. [place] high risk fall precautions, as indicated  3. Provide frequent short contacts to provide reality reorientation, refocusing and direction  4. Decrease environmental stimuli, including noise as appropriate  5. Monitor and intervene to maintain adequate nutrition, hydration, elimination, sleep and activity  6. If unable to ensure safety without constant attention obtain sitter and review sitter guidelines with assigned personnel  7. Initiate Psychosocial CNS and Spiritual Care consult, as indicated  2/5/2023 1634 by Wu Astorga RN  Outcome: Progressing  2/5/2023 1411 by Wu Astorga RN  Outcome: Progressing     Problem: Pain  Goal: Verbalizes/displays adequate comfort level or baseline comfort level  2/5/2023 1634 by Wu Astorga RN  Outcome: Progressing  2/5/2023 1411 by Wu Astorga RN  Outcome: Progressing     Problem: Skin/Tissue Integrity  Goal: Absence of new skin breakdown  Description: 1. Monitor for areas of redness and/or skin breakdown  2. Assess vascular access sites hourly  3. Every 4-6 hours minimum:  Change oxygen saturation probe site  4. Every 4-6 hours:  If on nasal continuous positive airway pressure, respiratory therapy assess nares and determine need for appliance change or resting period.   2/5/2023 1634 by Wu Astorga RN  Outcome: Progressing  2/5/2023 1411 by Wu Astorga RN  Outcome: Progressing

## 2023-02-05 NOTE — PLAN OF CARE
Problem: Safety - Adult  Goal: Free from fall injury  2/5/2023 1411 by Katelynn Sue RN  Outcome: Progressing  2/5/2023 0149 by Marilee Briones RN  Outcome: Progressing     Problem: ABCDS Injury Assessment  Goal: Absence of physical injury  2/5/2023 1411 by Katelynn Sue RN  Outcome: Progressing  2/5/2023 0149 by Marilee Briones RN  Outcome: Progressing     Problem: Risk for Elopement  Goal: Patient will not exit the unit/facility without proper excort  2/5/2023 1411 by Katelynn Sue RN  Outcome: Progressing  2/5/2023 0149 by Marilee Briones RN  Outcome: Progressing     Problem: Behavior  Goal: Pt/Family maintain appropriate behavior and adhere to behavioral management agreement, if implemented  Description: INTERVENTIONS:  1. Assess patient/family's coping skills and  non-compliant behavior (including use of illegal substances)  2. Notify security of behavior or suspected illegal substances which indicate the need for search of the family and/or belongings  3. Encourage verbalization of thoughts and concerns in a socially appropriate manner  4. Utilize positive, consistent limit setting strategies supporting safety of patient, staff and others  5. Encourage participation in the decision making process about the behavioral management agreement  6. If a visitor's behavior poses a threat to safety call refer to organization policy. 7. Initiate consult with , Psychosocial CNS, Spiritual Care as appropriate  2/5/2023 1411 by Katelynn Sue RN  Outcome: Progressing  2/5/2023 0149 by Marilee Briones RN  Outcome: Progressing     Problem: Anxiety  Goal: Will report anxiety at manageable levels  Description: INTERVENTIONS:  1. Administer medication as ordered  2. Teach and rehearse alternative coping skills  3.  Provide emotional support with 1:1 interaction with staff  2/5/2023 1411 by Katelynn Sue RN  Outcome: Progressing  2/5/2023 0149 by Marilee Briones RN  Outcome: Progressing     Problem: Confusion  Goal: Confusion, delirium, dementia, or psychosis is improved or at baseline  Description: INTERVENTIONS:  1. Assess for possible contributors to thought disturbance, including medications, impaired vision or hearing, underlying metabolic abnormalities, dehydration, psychiatric diagnoses, and notify attending LIP  2. [place] high risk fall precautions, as indicated  3. Provide frequent short contacts to provide reality reorientation, refocusing and direction  4. Decrease environmental stimuli, including noise as appropriate  5. Monitor and intervene to maintain adequate nutrition, hydration, elimination, sleep and activity  6. If unable to ensure safety without constant attention obtain sitter and review sitter guidelines with assigned personnel  7. Initiate Psychosocial CNS and Spiritual Care consult, as indicated  2/5/2023 1411 by Lelo Ashton RN  Outcome: Progressing  2/5/2023 0149 by Adrienne Polk RN  Outcome: Progressing     Problem: Pain  Goal: Verbalizes/displays adequate comfort level or baseline comfort level  2/5/2023 1411 by Lelo Ashton RN  Outcome: Progressing  2/5/2023 0149 by Adrienne Polk RN  Outcome: Progressing     Problem: Skin/Tissue Integrity  Goal: Absence of new skin breakdown  Description: 1. Monitor for areas of redness and/or skin breakdown  2. Assess vascular access sites hourly  3. Every 4-6 hours minimum:  Change oxygen saturation probe site  4. Every 4-6 hours:  If on nasal continuous positive airway pressure, respiratory therapy assess nares and determine need for appliance change or resting period.   2/5/2023 1411 by Lelo Ashton RN  Outcome: Progressing  2/5/2023 0149 by Adrienne Polk RN  Outcome: Progressing

## 2023-02-05 NOTE — PROGRESS NOTES
Senior Purposeful Rounding    Position:Back and Repositions Self    Physical Environment:Room free from clutter, Clear path to bathroom , Adequate lighting, Bed alarm functioning, and No safety hazards noted    Pain Rating/ Nonverbal Pain Behaviors:denies;     Pain interventions Attempted: N/A    Patient Toileted:Continent and Independent

## 2023-02-05 NOTE — PROGRESS NOTES
Senior Purposeful Rounding    Position:Back and Repositions Self    Physical Environment:Room free from clutter, Clear path to bathroom , Adequate lighting, Bed alarm functioning, and No safety hazards noted    Pain Rating/ Nonverbal Pain Behaviors:denies;     Pain interventions Attempted: N/A    Patient Toileted:Continent and Independent    Presently, pt laying in bed resting, with eyes closed.

## 2023-02-05 NOTE — PROGRESS NOTES
This nurse takes over care of this pt @ 1930. Meds @ 2100 were refused (Aricept and eye gtts) despite pt strongly encouraged and educated on the importance of taking them. Pt remains calm and cooperative otherwise this shift and doesn't perseverate on his personal belongings (ie: wallet) at all. Pt. Requests Trazadone later in the night because he was having trouble sleeping due to his roommate snoring. Pt falls back to sleep and rests comfortably. Nothing further. Will cont to monitor.

## 2023-02-05 NOTE — PROGRESS NOTES
Senior Purposeful Rounding    Position:Sitting    Physical Environment:Room free from clutter, Clear path to bathroom , Adequate lighting, and No safety hazards noted    Pain Rating/ Nonverbal Pain Behaviors:denies;       Pain interventions Attempted: N/A    Patient Toileted:Continent and Independent     Pt calm and watching tv in the common area at this time.

## 2023-02-06 PROCEDURE — 6370000000 HC RX 637 (ALT 250 FOR IP): Performed by: NURSE PRACTITIONER

## 2023-02-06 PROCEDURE — 97530 THERAPEUTIC ACTIVITIES: CPT

## 2023-02-06 PROCEDURE — 1240000000 HC EMOTIONAL WELLNESS R&B

## 2023-02-06 PROCEDURE — 6370000000 HC RX 637 (ALT 250 FOR IP)

## 2023-02-06 RX ADMIN — QUETIAPINE FUMARATE 50 MG: 25 TABLET ORAL at 18:23

## 2023-02-06 RX ADMIN — LISINOPRIL 10 MG: 10 TABLET ORAL at 12:07

## 2023-02-06 RX ADMIN — DORZOLAMIDE HYDROCHLORIDE 1 DROP: 20 SOLUTION/ DROPS OPHTHALMIC at 21:02

## 2023-02-06 RX ADMIN — TRAZODONE HYDROCHLORIDE 50 MG: 50 TABLET ORAL at 21:02

## 2023-02-06 RX ADMIN — DONEPEZIL HYDROCHLORIDE 5 MG: 5 TABLET, FILM COATED ORAL at 21:01

## 2023-02-06 RX ADMIN — QUETIAPINE FUMARATE 50 MG: 25 TABLET ORAL at 12:06

## 2023-02-06 RX ADMIN — Medication 10 MG: at 18:23

## 2023-02-06 RX ADMIN — DORZOLAMIDE HYDROCHLORIDE 1 DROP: 20 SOLUTION/ DROPS OPHTHALMIC at 12:07

## 2023-02-06 RX ADMIN — IBUPROFEN 400 MG: 400 TABLET ORAL at 21:01

## 2023-02-06 RX ADMIN — MEMANTINE 5 MG: 5 TABLET ORAL at 12:07

## 2023-02-06 ASSESSMENT — PAIN SCALES - GENERAL
PAINLEVEL_OUTOF10: 3
PAINLEVEL_OUTOF10: 3
PAINLEVEL_OUTOF10: 4

## 2023-02-06 ASSESSMENT — PAIN DESCRIPTION - LOCATION
LOCATION: HIP

## 2023-02-06 ASSESSMENT — PAIN DESCRIPTION - FREQUENCY
FREQUENCY: CONTINUOUS
FREQUENCY: INTERMITTENT

## 2023-02-06 ASSESSMENT — PAIN DESCRIPTION - DESCRIPTORS
DESCRIPTORS: ACHING

## 2023-02-06 ASSESSMENT — PAIN DESCRIPTION - ONSET
ONSET: ON-GOING
ONSET: ON-GOING

## 2023-02-06 ASSESSMENT — PAIN DESCRIPTION - ORIENTATION
ORIENTATION: LEFT

## 2023-02-06 ASSESSMENT — PAIN - FUNCTIONAL ASSESSMENT
PAIN_FUNCTIONAL_ASSESSMENT: PREVENTS OR INTERFERES SOME ACTIVE ACTIVITIES AND ADLS
PAIN_FUNCTIONAL_ASSESSMENT: PREVENTS OR INTERFERES SOME ACTIVE ACTIVITIES AND ADLS

## 2023-02-06 ASSESSMENT — PAIN DESCRIPTION - PAIN TYPE
TYPE: ACUTE PAIN
TYPE: ACUTE PAIN

## 2023-02-06 ASSESSMENT — PAIN SCALES - WONG BAKER: WONGBAKER_NUMERICALRESPONSE: 0

## 2023-02-06 NOTE — PROGRESS NOTES
This nurse takes over care of this pt @ 1930. Pt continues with confusion that requires redirection. Increased agitation noted and Pt questions why he's here on the unit stating that he doesn't understand why he's here and then becomes argumentative with conversation. Pt reassured and encouraged to get rest and not to worry. Pt refuses his night eye gtts again despite encouragement. Pt goes to bed and rests comfortably the rest of the evening.

## 2023-02-06 NOTE — PLAN OF CARE
Problem: Safety - Adult  Goal: Free from fall injury  2/5/2023 2343 by Michael Franco RN  Outcome: Progressing  2/5/2023 1634 by Cm Vega RN  Outcome: Progressing  2/5/2023 1411 by Cm Vega RN  Outcome: Progressing     Problem: ABCDS Injury Assessment  Goal: Absence of physical injury  2/5/2023 2343 by Michael Franco RN  Outcome: Progressing  2/5/2023 1634 by Cm Vega RN  Outcome: Progressing  2/5/2023 1411 by Cm Vega RN  Outcome: Progressing     Problem: Risk for Elopement  Goal: Patient will not exit the unit/facility without proper excort  2/5/2023 2343 by Michael Franco RN  Outcome: Progressing  2/5/2023 1634 by Cm Vega RN  Outcome: Progressing  2/5/2023 1411 by Cm Vega RN  Outcome: Progressing     Problem: Behavior  Goal: Pt/Family maintain appropriate behavior and adhere to behavioral management agreement, if implemented  Description: INTERVENTIONS:  1. Assess patient/family's coping skills and  non-compliant behavior (including use of illegal substances)  2. Notify security of behavior or suspected illegal substances which indicate the need for search of the family and/or belongings  3. Encourage verbalization of thoughts and concerns in a socially appropriate manner  4. Utilize positive, consistent limit setting strategies supporting safety of patient, staff and others  5. Encourage participation in the decision making process about the behavioral management agreement  6. If a visitor's behavior poses a threat to safety call refer to organization policy. 7. Initiate consult with , Psychosocial CNS, Spiritual Care as appropriate  2/5/2023 2343 by Michael Franco RN  Outcome: Progressing  2/5/2023 1634 by Cm Vega RN  Outcome: Progressing  2/5/2023 1411 by Cm Vega RN  Outcome: Progressing     Problem: Anxiety  Goal: Will report anxiety at manageable levels  Description: INTERVENTIONS:  1. Administer medication as ordered  2.  Teach and rehearse alternative coping skills  3. Provide emotional support with 1:1 interaction with staff  2/5/2023 2343 by Chidi Alexander RN  Outcome: Progressing  2/5/2023 1634 by Jose Ndiaye RN  Outcome: Progressing  2/5/2023 1411 by Jose Ndiaye RN  Outcome: Progressing     Problem: Confusion  Goal: Confusion, delirium, dementia, or psychosis is improved or at baseline  Description: INTERVENTIONS:  1. Assess for possible contributors to thought disturbance, including medications, impaired vision or hearing, underlying metabolic abnormalities, dehydration, psychiatric diagnoses, and notify attending LIP  2. [place] high risk fall precautions, as indicated  3. Provide frequent short contacts to provide reality reorientation, refocusing and direction  4. Decrease environmental stimuli, including noise as appropriate  5. Monitor and intervene to maintain adequate nutrition, hydration, elimination, sleep and activity  6. If unable to ensure safety without constant attention obtain sitter and review sitter guidelines with assigned personnel  7. Initiate Psychosocial CNS and Spiritual Care consult, as indicated  2/5/2023 2343 by Chidi Alexander RN  Outcome: Not Progressing  2/5/2023 1634 by Jose Ndiaye RN  Outcome: Progressing  2/5/2023 1411 by Jose Ndiaye RN  Outcome: Progressing     Problem: Pain  Goal: Verbalizes/displays adequate comfort level or baseline comfort level  2/5/2023 2343 by Chidi Alexander RN  Outcome: Progressing  2/5/2023 1634 by Jose Ndiaye RN  Outcome: Progressing  2/5/2023 1411 by Jose Ndiaye RN  Outcome: Progressing     Problem: Skin/Tissue Integrity  Goal: Absence of new skin breakdown  Description: 1. Monitor for areas of redness and/or skin breakdown  2. Assess vascular access sites hourly  3. Every 4-6 hours minimum:  Change oxygen saturation probe site  4.   Every 4-6 hours:  If on nasal continuous positive airway pressure, respiratory therapy assess nares and determine need for appliance change or resting period. 2/5/2023 2343 by González Todd RN  Outcome: Progressing  2/5/2023 1634 by Miroslava Wang RN  Outcome: Progressing  2/5/2023 1411 by Miroslava Wang RN  Outcome: Progressing     Problem: Confusion  Goal: Confusion, delirium, dementia, or psychosis is improved or at baseline  Description: INTERVENTIONS:  1. Assess for possible contributors to thought disturbance, including medications, impaired vision or hearing, underlying metabolic abnormalities, dehydration, psychiatric diagnoses, and notify attending LIP  2. [place] high risk fall precautions, as indicated  3. Provide frequent short contacts to provide reality reorientation, refocusing and direction  4. Decrease environmental stimuli, including noise as appropriate  5. Monitor and intervene to maintain adequate nutrition, hydration, elimination, sleep and activity  6. If unable to ensure safety without constant attention obtain sitter and review sitter guidelines with assigned personnel  7. Initiate Psychosocial CNS and Spiritual Care consult, as indicated  2/5/2023 2343 by González Todd RN  Outcome: Not Progressing  2/5/2023 1634 by Miroslava Wang RN  Outcome: Progressing  2/5/2023 1411 by Miroslava Wang RN  Outcome: Progressing       Pt remains confused. Pt with increasing agitation through the evening. Pt with no skin breakdown, no injuries since admission. Will cont to monitor.

## 2023-02-06 NOTE — PROGRESS NOTES
Department of Psychiatry  AttendingProgress Note  Chief Complaint: Dementia with behavioral disturbance    Patient's chart was reviewed and collaborated with  about the treatment plan. SUBJECTIVE:    Pt up in common area today, calm, talking with peers. Pt asked about going home, smiled when told it was tomorrow. Pt states he feels good, got a haircut, happy to be going home with his daughter. Pt did not ask about his house or car, states he is ready to leave when his daughter comes for him. Plan to discharge home with family tomorrow. Plan:  1/30: Increase Seroquel to 50mg BID  2/2: Increase Melatonin to 10mg every evening, change seroquel to evenings as well. Patient is feeling unchanged. Suicidal ideation:  denies suicidal ideation. Patient does not have medication side effects. ROS: Patient has new complaints: no  Sleeping adequately:  No:    Appetite adequate: Yes  Attending groups: No:   Visitors:No    OBJECTIVE    Physical  VITALS:  /66   Pulse 61   Temp 99 °F (37.2 °C)   Resp 12   Ht 5' 11\" (1.803 m)   Wt 207 lb 1.6 oz (93.9 kg)   SpO2 99%   BMI 28.88 kg/m²     Mental Status Examination:  Patients appearance was well-appearing, street clothes, and in chair. Thoughts are Obsessive. Homicidal ideations none. No abnormal movements, tics or mannerisms. Memory impaired immediate recall and recent memory Aims 0. Concentration Fair. Alert and oriented X 4. Insight and Judgement impaired insight. Patient was cooperative.  Patient gait normal. Mood irritable, affect agitation Hallucinations Absent, suicidal ideations no specific plan to harm self Speech normal pitch and normal volume    Data  Labs:   Admission on 01/28/2023   Component Date Value Ref Range Status    TSH 01/28/2023 3.30  0.27 - 4.20 uIU/mL Final    Cholesterol, Total 01/28/2023 196  0 - 199 mg/dL Final    Triglycerides 01/28/2023 66  0 - 150 mg/dL Final    HDL 01/28/2023 52  40 - 60 mg/dL Final Comment: An HDL cholesterol less than 40 mg/dL is low and  constitutes a coronary heart disease risk factor. An HDL cholesterol greater than 60 mg/dL is a  negative risk factor for coronary heart disease.       LDL Calculated 01/28/2023 131 (A)  <100 mg/dL Final    VLDL Cholesterol Calculated 01/28/2023 13  Not Established mg/dL Final    Hemoglobin A1C 01/28/2023 5.5  See comment % Final    Comment: Comment:  Diagnosis of Diabetes: > or = 6.5%  Increased risk of diabetes (Prediabetes): 5.7-6.4%  Glycemic Control: Nonpregnant Adults: <7.0%                    Pregnant: <6.0%        eAG 01/28/2023 111.2  mg/dL Final            Medications  Current Facility-Administered Medications: melatonin disintegrating tablet 10 mg, 10 mg, Oral, QPM  QUEtiapine (SEROQUEL) tablet 50 mg, 50 mg, Oral, BID  ibuprofen (ADVIL;MOTRIN) tablet 400 mg, 400 mg, Oral, Q6H PRN  lisinopril (PRINIVIL;ZESTRIL) tablet 10 mg, 10 mg, Oral, Daily  dorzolamide (TRUSOPT) 2 % ophthalmic solution 1 drop, 1 drop, Both Eyes, BID  magnesium hydroxide (MILK OF MAGNESIA) 400 MG/5ML suspension 30 mL, 30 mL, Oral, Daily PRN  nicotine polacrilex (COMMIT) lozenge 2 mg, 2 mg, Oral, Q1H PRN  aluminum & magnesium hydroxide-simethicone (MAALOX) 200-200-20 MG/5ML suspension 30 mL, 30 mL, Oral, Q6H PRN  hydrOXYzine HCl (ATARAX) tablet 50 mg, 50 mg, Oral, TID PRN  OLANZapine (ZYPREXA) tablet 5 mg, 5 mg, Oral, Q4H PRN **OR** OLANZapine (ZYPREXA) 10 mg in sterile water 2 mL injection, 10 mg, IntraMUSCular, Q4H PRN  diphenhydrAMINE (BENADRYL) injection 50 mg, 50 mg, IntraMUSCular, Q4H PRN  traZODone (DESYREL) tablet 50 mg, 50 mg, Oral, Nightly PRN  donepezil (ARICEPT) tablet 5 mg, 5 mg, Oral, Nightly  memantine (NAMENDA) tablet 5 mg, 5 mg, Oral, Daily    ASSESSMENT AND PLAN    Principal Problem:    Dementia with behavioral disturbance  Active Problems:    Closed left hip fracture, sequela    Encounter for general medical examination    Hypertension, essential  Resolved Problems:    * No resolved hospital problems. *       1. Patient's symptoms   show no change  2. Probable discharge is Tuesday  3. Discharge planning is incomplete  4. Suicidal ideation is better  5. Total time with patient was 40 minutes and more than 50 % of that time was spent counseling the patient on their symptoms, treatment and expected goals.      Micheal Samuel, PMHNP-BC

## 2023-02-06 NOTE — BH NOTE
Referral clinical faxed to [phone] [facility].     Follow up vm left to confirm fax

## 2023-02-06 NOTE — PROGRESS NOTES
Senior Purposeful Rounding    Position:Back and Repositions Self    Physical Environment:Room free from clutter, Clear path to bathroom , Adequate lighting, and No safety hazards noted    Pain Rating/ Nonverbal Pain Behaviors:denies;     Pain interventions Attempted: N/A    Patient Toileted:Continent and Independent

## 2023-02-06 NOTE — GROUP NOTE
Group Therapy Note    Date: 2/6/2023    Group Start Time: 1000  Group End Time: 3041  Group Topic: Orientation [address]al[address]        Group Therapy Note    Group facilitator led collaborative social group, using semi-structured process to assess reality orientation and psychosocial needs. Group engaged in either/or \"would you rather\" questioning, discussing topics therein; facilitator allowing natural progression of socialization in reflection. Attendees: 7       Notes: This pt was present and actively engaged across group discussions and socialization. Appropriate in all responses provided, attentive to peers and followed social cues appropriately. No needs verbalized at conclusion of session. Status After Intervention:  Improved    Participation Level:  Active Listener and Interactive    Participation Quality: Appropriate, Attentive, and Sharing      Speech:  normal      Thought Process/Content: Logical      Affective Functioning: Congruent      Mood: euthymic      Level of consciousness:  Alert and Attentive      Response to Learning: Capable of insight and Progressing to goal      Endings: None Reported    Modes of Intervention: Support, Socialization, Exploration, Activity, Media, and Reality-testing      Discipline Responsible: Psychoeducational Specialist      Signature:  Sherley Hussein MM, MT-BC

## 2023-02-06 NOTE — GROUP NOTE
Group Therapy Note    Date: 2/6/2023    Group Start Time: 1300  Group End Time: 3067  Group Topic: Activity    [address]        Group Therapy Note    Attendees: 5    Facilitator led an arts and crafts activity. Patients were provided with wooden bird houses and paint supplies. Patients were given the option to pick two colors of paint and were given time to paint their bird houses as they saw fit. Whilst painting, patients discussed bird watching, nature, and personal experiences of interacting with wildlife. Activity supported the development of motor skills, hand-eye coordination, reminiscing, and socialization. Patient's Goal:  Patient will engage in group discussion and paint a wooden birdhouse. Notes:  Patient was an active listener in group discussion but did not participate much in discussion. Patient was attentive on the art work and completed two bird houses during group time. Patient continued to check on project after the conclusion of group and disclosed being fond of his work. Status After Intervention:  Improved    Participation Level: Active Listener     Participation Quality: Appropriate      Speech:  Congruent      Thought Process/Content: Linear      Affective Functioning: Euthymic    Mood: Euthymic      Level of consciousness:  Alert and Aware    Response to Learning: Progressing to goal, able to change behavior, capable of insight.     Endings: None Reported    Modes of Intervention: Socialization and Activity      Discipline Responsible: /Counselor      Signature:  LELAND Vu

## 2023-02-07 VITALS
TEMPERATURE: 98.2 F | HEIGHT: 71 IN | SYSTOLIC BLOOD PRESSURE: 92 MMHG | BODY MASS INDEX: 28.99 KG/M2 | HEART RATE: 66 BPM | OXYGEN SATURATION: 93 % | WEIGHT: 207.1 LBS | DIASTOLIC BLOOD PRESSURE: 63 MMHG | RESPIRATION RATE: 16 BRPM

## 2023-02-07 PROCEDURE — 6370000000 HC RX 637 (ALT 250 FOR IP)

## 2023-02-07 RX ORDER — MECOBALAMIN 5000 MCG
10 TABLET,DISINTEGRATING ORAL EVERY EVENING
Qty: 30 TABLET | Refills: 0 | Status: SHIPPED | OUTPATIENT
Start: 2023-02-07

## 2023-02-07 RX ORDER — DONEPEZIL HYDROCHLORIDE 5 MG/1
5 TABLET, FILM COATED ORAL NIGHTLY
Qty: 30 TABLET | Refills: 0 | Status: SHIPPED | OUTPATIENT
Start: 2023-02-07

## 2023-02-07 RX ORDER — TRAZODONE HYDROCHLORIDE 50 MG/1
50 TABLET ORAL NIGHTLY PRN
Qty: 30 TABLET | Refills: 0 | Status: SHIPPED | OUTPATIENT
Start: 2023-02-07

## 2023-02-07 RX ORDER — QUETIAPINE FUMARATE 50 MG/1
50 TABLET, FILM COATED ORAL 2 TIMES DAILY
Qty: 60 TABLET | Refills: 0 | Status: SHIPPED | OUTPATIENT
Start: 2023-02-07

## 2023-02-07 RX ORDER — MEMANTINE HYDROCHLORIDE 5 MG/1
5 TABLET ORAL DAILY
Qty: 60 TABLET | Refills: 0 | Status: SHIPPED | OUTPATIENT
Start: 2023-02-08

## 2023-02-07 RX ADMIN — MEMANTINE 5 MG: 5 TABLET ORAL at 08:42

## 2023-02-07 RX ADMIN — QUETIAPINE FUMARATE 50 MG: 25 TABLET ORAL at 08:42
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
## 2023-02-07 NOTE — PLAN OF CARE
[facility]  Discharge Note    Pt discharged with followings belongings:   Dental Appliances: None  Vision - Corrective Lenses: At home, Eyeglasses  Hearing Aid: None  Jewelry: Watch (2x watches on pt's wrist)  Body Piercings Removed: N/A  Clothing: Socks, Shirt, Pants, Jacket/Coat, Footwear (Long sleeve shirt, white t shirt, maroon sweats, loafers, socks)  Other Valuables: Wallet, Money, Other (Comment) ($79.00 cash in wallet, 's license and ss card, checkbook - in safe)   Valuables sent home withpatient and Dtr or returned to patient. Patient educated on aftercare instructions: yes  Information faxed to HCP by LSW  at 1:28 PM .Patient verbalize understanding of AVS:  yes. Status EXAM upon discharge:  Mental Status and Behavioral Exam  Normal: No  Level of Assistance: Independent/Self  Facial Expression: Worried  Affect: Congruent  Level of Consciousness: Confused  Frequency of Checks: 4 times per hour, close  Mood:Normal: No  Mood: Anxious  Motor Activity:Normal: Yes  Motor Activity: Decreased  Eye Contact: Good  Observed Behavior: Cooperative  Sexual Misconduct History: Current - no  Preception: [place] to person, [place] to place  Attention:Normal: No  Attention: Distractible, Unable to concentrate  Thought Processes: Perseveration  Thought Content:Normal: No  Thought Content: Obsessions, Preoccupations  Depression Symptoms: Increased irritability, Impaired concentration, Sleep disturbance  Anxiety Symptoms: Generalized  [place] Symptoms: No problems reported or observed. Hallucinations: None  Delusions: No  Delusions:  Other (comment)  Memory:Normal: No  Memory: Poor recent  Insight and Judgment: No  Insight and Judgment: Poor judgment, Poor insight    Tobacco Screening:  Practical Counseling, on admission, christi X, if applicable and completed (first 3 are required if patient doesn't refuse):            ( ) Recognizing danger situations (included triggers and roadblocks)                    ( ) Coping skills (new ways to manage stress,relaxation techniques, changing routine, distraction)                                                           ( ) Basic information about quitting (benefits of quitting, techniques in how to quit, available resources  ( ) Referral for counseling faxed to [facility]                                                                                                                   ( ) Patient refused counseling  ( ) Patient refused referral  ( ) Patient refused prescription upon discharge  ( x) Patient has not smoked in the last 30 days    Metabolic Screening:    Lab Results   Component Value Date    LABA1C 5.5 01/28/2023       Lab Results   Component Value Date    CHOL 196 01/28/2023    CHOL 173 02/15/2022    CHOL 147 12/02/2019    CHOL 164 11/02/2018    CHOL 173 11/27/2017    CHOL 183 09/28/2016    CHOL 178 10/20/2015    CHOL 164 08/27/2014    CHOL 173 01/15/2013    CHOL 177 02/10/2012    CHOL 169 05/03/2010     Lab Results   Component Value Date    TRIG 66 01/28/2023    TRIG 82 02/15/2022    TRIG 96 12/02/2019    TRIG 50 11/02/2018    TRIG 63 11/27/2017    TRIG 61 09/28/2016    TRIG 80 10/20/2015    TRIG 101 08/27/2014    TRIG 44 01/15/2013    TRIG 56 02/10/2012    TRIG 80 05/03/2010     Lab Results   Component Value Date    HDL 52 01/28/2023    HDL 59 02/15/2022    HDL 57 12/02/2019    HDL 57 11/02/2018    HDL 63 (H) 11/27/2017    HDL 63 (H) 09/28/2016    HDL 60 10/20/2015    HDL 50 08/27/2014    HDL 61 (H) 01/15/2013    HDL 54 02/10/2012    HDL 46 05/03/2010     No components found for: LDLCAL  Lab Results   Component Value Date    LABVLDL 13 01/28/2023    LABVLDL 16 02/15/2022    LABVLDL 19 12/02/2019    LABVLDL 10 11/02/2018    LABVLDL 13 11/27/2017    LABVLDL 12 09/28/2016    LABVLDL 16 10/20/2015    LABVLDL 20 08/27/2014    LABVLDL 9 01/15/2013    LABVLDL 11 02/10/2012    LABVLDL 16 05/03/2010       Fredis Dowell RN  Bridge Appointment completed: Reviewed Discharge Instructions with patient. Patient verbalizes understanding and agreement with the discharge plan using the teachback method.      Referral for Outpatient Tobacco Cessation Counseling, upon discharge (christi X if applicable and completed):    ( )  Hospital staff assisted patient to call Quit Line or faxed referral                                   during hospitalization                  ( )  Recognizing danger situations (included triggers and roadblocks), if not completed on admission                    ( )  Coping skills (new ways to manage stress, exercise, relaxation techniques, changing routine, distraction), if not completed on admission                                                           ( )  Basic information about quitting (benefits of quitting, techniques in how to quit, available resources, if not completed on admission  ( ) [facility]   ( ) Patient refused referral  ( ) Patient refused counseling  (x ) Patient refused smoking cessation medication upon discharge    Vaccinations (christi X if applicable and completed):  ( ) Patient states already received influenza vaccine elsewhere  ( ) Patient received influenza vaccine during this hospitalization  ( x) Patient refused influenza vaccine at this time

## 2023-02-07 NOTE — PLAN OF CARE
Problem: Anxiety  Goal: Will report anxiety at manageable levels  Description: INTERVENTIONS:  1. Administer medication as ordered  2. Teach and rehearse alternative coping skills  3. Provide emotional support with 1:1 interaction with staff  Outcome: Angel Langley was visible and social in the milieu this shift. He was compliant with his medications. Morgan attended some of the group therapy sessions. Brendon Ang continues to be confused, asking about his wallet multiple times.

## 2023-02-23 NOTE — DISCHARGE SUMMARY
[facility]                     [facility], [address]                               DISCHARGE SUMMARY    PATIENT NAME: Danielle Devine                  :        1935  MED REC NO:   [id number]                          ROOM:       4479  ACCOUNT NO:   [de-identified]                           ADMIT DATE: 2023  PROVIDER:     Len Bundy MD                  DISCHARGE DATE:  2023    FINAL DIAGNOSES:  1. Fracture of the greater trochanter. 2.  Dementia. 3.  Hypertension. 4.  Relative bradycardia. DISCHARGE MEDICATIONS:  1. Tramadol 50 mg every 6 hours p.r.n.  2.  Namenda 5 mg p.o. b.i.d.  3.  Prinivil 10 mg was discontinued. 4.  Lumigan eye drops as directed. HOSPITAL COURSE:  This 81-year-old white gentleman came to the emergency  room with history of fall that happened 45 minutes ago. No evidence of  dizziness, convulsions. Temperature 97.1, blood pressure 147/95,  respirations 19, heart rate 67, O2 sat 97% on room air. Body mass index  25.8. Neurologically, the patient was essentially intact. Lab  evaluation shows sodium 141, potassium 4.9, chloride 109, CO2 28, BUN  13, creatinine 0.8, anion gap is 7, blood glucose is 119. White blood  cell count 6.8, hemoglobin/hematocrit was 13 and 40.2. X-ray of the hip  shows no acute abnormality. There is an acute nondisplaced avulsion  kind of greater trochanteric left greater trochanteric fracture with no  transcervical or intertrochanteric component to it. Chest x-ray was not  indicated. EKG was not indicated. The patient is predominantly a  nonsurgical case. He was given temporary stabilization with IV  hydration, parenteral analgesics, PT, OT evaluation and the next day the  patient was discharged in stable condition to acute rehab. Dr. Ruffin Apley would be the person accepting. The patient was discharged in  stable condition on 2023 to rehab.   Rohith schrader necessary  arrangement for the patient to go to [facility].         Varsha Acosta MD    D: 02/22/2023 22:29:33       T: 02/22/2023 22:32:01     [id number]  Job#: [id number]     Doc#: [id number]    CC:

## 2023-02-23 NOTE — PROGRESS NOTES
Patient seen , discharge dictated scripts given , arrangements made , ZACHARY completed .  Discussed with nursing staff  And   If applicable ,  Discussed with  Patient's family , all questions answered and concerns addressed  When applicable

## 2023-02-27 RX ORDER — QUETIAPINE FUMARATE 25 MG/1
TABLET, FILM COATED ORAL
Qty: 60 TABLET | Refills: 0 | OUTPATIENT
Start: 2023-02-27

## 2023-05-14 ENCOUNTER — HOSPITAL ENCOUNTER (EMERGENCY)
Age: 88
Discharge: HOME | End: 2023-05-14
Attending: EMERGENCY MEDICINE
Payer: MEDICARE

## 2023-05-14 ENCOUNTER — APPOINTMENT (OUTPATIENT)
Dept: GENERAL RADIOLOGY | Age: 88
End: 2023-05-14
Payer: MEDICARE

## 2023-05-14 VITALS
HEART RATE: 81 BPM | DIASTOLIC BLOOD PRESSURE: 53 MMHG | WEIGHT: 185 LBS | RESPIRATION RATE: 15 BRPM | OXYGEN SATURATION: 96 % | HEIGHT: 72 IN | TEMPERATURE: 101.3 F | SYSTOLIC BLOOD PRESSURE: 94 MMHG | BODY MASS INDEX: 25.06 KG/M2

## 2023-05-14 DIAGNOSIS — N17.9 AKI (ACUTE KIDNEY INJURY) (HCC): Primary | ICD-10-CM

## 2023-05-14 DIAGNOSIS — N30.00 ACUTE CYSTITIS WITHOUT HEMATURIA: ICD-10-CM

## 2023-05-14 DIAGNOSIS — Z51.5 HOSPICE CARE PATIENT: ICD-10-CM

## 2023-05-14 DIAGNOSIS — R63.0 POOR APPETITE: ICD-10-CM

## 2023-05-14 DIAGNOSIS — E86.0 DEHYDRATION: ICD-10-CM

## 2023-05-14 LAB
ALBUMIN SERPL-MCNC: 3.4 G/DL (ref 3.4–5)
ALBUMIN/GLOB SERPL: 0.9 {RATIO} (ref 1.1–2.2)
ALP SERPL-CCNC: 96 U/L (ref 40–129)
ALT SERPL-CCNC: 10 U/L (ref 10–40)
ANION GAP SERPL CALCULATED.3IONS-SCNC: 9 MMOL/L (ref 3–16)
AST SERPL-CCNC: 8 U/L (ref 15–37)
BACTERIA URNS QL MICRO: ABNORMAL /HPF
BASOPHILS # BLD: 0 K/UL (ref 0–0.2)
BASOPHILS NFR BLD: 0.2 %
BILIRUB SERPL-MCNC: 0.9 MG/DL (ref 0–1)
BILIRUB UR QL STRIP.AUTO: NEGATIVE
BUN SERPL-MCNC: 36 MG/DL (ref 7–20)
CALCIUM SERPL-MCNC: 8.5 MG/DL (ref 8.3–10.6)
CHLORIDE SERPL-SCNC: 103 MMOL/L (ref 99–110)
CLARITY UR: ABNORMAL
CO2 SERPL-SCNC: 26 MMOL/L (ref 21–32)
COLOR UR: YELLOW
CREAT SERPL-MCNC: 1.6 MG/DL (ref 0.8–1.3)
DEPRECATED RDW RBC AUTO: 12.4 % (ref 12.4–15.4)
EOSINOPHIL # BLD: 0 K/UL (ref 0–0.6)
EOSINOPHIL NFR BLD: 0 %
EPI CELLS #/AREA URNS AUTO: 1 /HPF (ref 0–5)
FLUAV RNA UPPER RESP QL NAA+PROBE: NEGATIVE
FLUBV AG NPH QL: NEGATIVE
GFR SERPLBLD CREATININE-BSD FMLA CKD-EPI: 41 ML/MIN/{1.73_M2}
GLUCOSE SERPL-MCNC: 152 MG/DL (ref 70–99)
GLUCOSE UR STRIP.AUTO-MCNC: NEGATIVE MG/DL
GRANULAR CASTS: PRESENT
HCT VFR BLD AUTO: 33.8 % (ref 40.5–52.5)
HGB BLD-MCNC: 10.9 G/DL (ref 13.5–17.5)
HGB UR QL STRIP.AUTO: ABNORMAL
HYALINE CASTS #/AREA URNS AUTO: 8 /LPF (ref 0–8)
HYALINE CASTS: PRESENT
KETONES UR STRIP.AUTO-MCNC: ABNORMAL MG/DL
LACTATE BLDV-SCNC: 1.5 MMOL/L (ref 0.4–1.9)
LEUKOCYTE ESTERASE UR QL STRIP.AUTO: ABNORMAL
LYMPHOCYTES # BLD: 0.7 K/UL (ref 1–5.1)
LYMPHOCYTES NFR BLD: 4.8 %
MAGNESIUM SERPL-MCNC: 2.2 MG/DL (ref 1.8–2.4)
MCH RBC QN AUTO: 29.2 PG (ref 26–34)
MCHC RBC AUTO-ENTMCNC: 32.3 G/DL (ref 31–36)
MCV RBC AUTO: 90.4 FL (ref 80–100)
MONOCYTES # BLD: 1 K/UL (ref 0–1.3)
MONOCYTES NFR BLD: 6.9 %
NEUTROPHILS # BLD: 12.1 K/UL (ref 1.7–7.7)
NEUTROPHILS NFR BLD: 88.1 %
NITRITE UR QL STRIP.AUTO: POSITIVE
PH UR STRIP.AUTO: 5 [PH] (ref 5–8)
PLATELET # BLD AUTO: 148 K/UL (ref 135–450)
PMV BLD AUTO: 8.8 FL (ref 5–10.5)
POTASSIUM SERPL-SCNC: 4.6 MMOL/L (ref 3.5–5.1)
PROCALCITONIN SERPL IA-MCNC: 0.11 NG/ML (ref 0–0.15)
PROT SERPL-MCNC: 7 G/DL (ref 6.4–8.2)
PROT UR STRIP.AUTO-MCNC: 30 MG/DL
RBC # BLD AUTO: 3.74 M/UL (ref 4.2–5.9)
RBC CLUMPS #/AREA URNS AUTO: 2 /HPF (ref 0–4)
SARS-COV-2 RDRP RESP QL NAA+PROBE: NOT DETECTED
SODIUM SERPL-SCNC: 138 MMOL/L (ref 136–145)
SP GR UR STRIP.AUTO: 1.01 (ref 1–1.03)
UA COMPLETE W REFLEX CULTURE PNL UR: YES
UA DIPSTICK W REFLEX MICRO PNL UR: YES
URN SPEC COLLECT METH UR: ABNORMAL
UROBILINOGEN UR STRIP-ACNC: 1 E.U./DL
WBC # BLD AUTO: 13.7 K/UL (ref 4–11)
WBC #/AREA URNS AUTO: 215 /HPF (ref 0–5)

## 2023-05-14 PROCEDURE — 36415 COLL VENOUS BLD VENIPUNCTURE: CPT

## 2023-05-14 PROCEDURE — 81001 URINALYSIS AUTO W/SCOPE: CPT

## 2023-05-14 PROCEDURE — 87086 URINE CULTURE/COLONY COUNT: CPT

## 2023-05-14 PROCEDURE — 71045 X-RAY EXAM CHEST 1 VIEW: CPT

## 2023-05-14 PROCEDURE — 93005 ELECTROCARDIOGRAM TRACING: CPT | Performed by: EMERGENCY MEDICINE

## 2023-05-14 PROCEDURE — 6360000002 HC RX W HCPCS: Performed by: EMERGENCY MEDICINE

## 2023-05-14 PROCEDURE — 6370000000 HC RX 637 (ALT 250 FOR IP): Performed by: PHYSICIAN ASSISTANT

## 2023-05-14 PROCEDURE — 99285 EMERGENCY DEPT VISIT HI MDM: CPT

## 2023-05-14 PROCEDURE — 87804 INFLUENZA ASSAY W/OPTIC: CPT

## 2023-05-14 PROCEDURE — 85025 COMPLETE CBC W/AUTO DIFF WBC: CPT

## 2023-05-14 PROCEDURE — 87186 SC STD MICRODIL/AGAR DIL: CPT

## 2023-05-14 PROCEDURE — 83735 ASSAY OF MAGNESIUM: CPT

## 2023-05-14 PROCEDURE — 80053 COMPREHEN METABOLIC PANEL: CPT

## 2023-05-14 PROCEDURE — 87088 URINE BACTERIA CULTURE: CPT

## 2023-05-14 PROCEDURE — 87635 SARS-COV-2 COVID-19 AMP PRB: CPT

## 2023-05-14 PROCEDURE — 84145 PROCALCITONIN (PCT): CPT

## 2023-05-14 PROCEDURE — 87040 BLOOD CULTURE FOR BACTERIA: CPT

## 2023-05-14 PROCEDURE — 2580000003 HC RX 258: Performed by: EMERGENCY MEDICINE

## 2023-05-14 PROCEDURE — 96361 HYDRATE IV INFUSION ADD-ON: CPT

## 2023-05-14 PROCEDURE — 2580000003 HC RX 258: Performed by: PHYSICIAN ASSISTANT

## 2023-05-14 PROCEDURE — 83605 ASSAY OF LACTIC ACID: CPT

## 2023-05-14 PROCEDURE — 96365 THER/PROPH/DIAG IV INF INIT: CPT

## 2023-05-14 RX ORDER — CEPHALEXIN 500 MG/1
500 CAPSULE ORAL 3 TIMES DAILY
Qty: 30 CAPSULE | Refills: 0 | Status: SHIPPED | OUTPATIENT
Start: 2023-05-14 | End: 2023-05-24

## 2023-05-14 RX ORDER — ACETAMINOPHEN 325 MG/1
325 TABLET ORAL ONCE
Status: COMPLETED | OUTPATIENT
Start: 2023-05-14 | End: 2023-05-14

## 2023-05-14 RX ORDER — ACETAMINOPHEN 325 MG/1
650 TABLET ORAL ONCE
Status: COMPLETED | OUTPATIENT
Start: 2023-05-14 | End: 2023-05-14

## 2023-05-14 RX ORDER — 0.9 % SODIUM CHLORIDE 0.9 %
1000 INTRAVENOUS SOLUTION INTRAVENOUS ONCE
Status: COMPLETED | OUTPATIENT
Start: 2023-05-14 | End: 2023-05-14

## 2023-05-14 RX ADMIN — CEFTRIAXONE SODIUM 1000 MG: 1 INJECTION, POWDER, FOR SOLUTION INTRAMUSCULAR; INTRAVENOUS at 16:20

## 2023-05-14 RX ADMIN — ACETAMINOPHEN 325 MG: 325 TABLET ORAL at 16:12

## 2023-05-14 RX ADMIN — ACETAMINOPHEN 650 MG: 325 TABLET ORAL at 14:52

## 2023-05-14 RX ADMIN — SODIUM CHLORIDE 1000 ML: 9 INJECTION, SOLUTION INTRAVENOUS at 14:51

## 2023-05-14 ASSESSMENT — PAIN - FUNCTIONAL ASSESSMENT: PAIN_FUNCTIONAL_ASSESSMENT: 0-10

## 2023-05-14 ASSESSMENT — PAIN SCALES - GENERAL
PAINLEVEL_OUTOF10: 0

## 2023-05-14 ASSESSMENT — ENCOUNTER SYMPTOMS
DIARRHEA: 0
CONSTIPATION: 0
BACK PAIN: 0
ABDOMINAL PAIN: 0
SHORTNESS OF BREATH: 0
EYES NEGATIVE: 1
COLOR CHANGE: 0
VOMITING: 0
NAUSEA: 0

## 2023-05-15 ENCOUNTER — CARE COORDINATION (OUTPATIENT)
Dept: CARE COORDINATION | Age: 88
End: 2023-05-15

## 2023-05-15 LAB
EKG ATRIAL RATE: 79 BPM
EKG DIAGNOSIS: NORMAL
EKG P AXIS: 84 DEGREES
EKG P-R INTERVAL: 186 MS
EKG Q-T INTERVAL: 348 MS
EKG QRS DURATION: 96 MS
EKG QTC CALCULATION (BAZETT): 399 MS
EKG R AXIS: -8 DEGREES
EKG T AXIS: -13 DEGREES
EKG VENTRICULAR RATE: 79 BPM

## 2023-05-15 PROCEDURE — 93010 ELECTROCARDIOGRAM REPORT: CPT | Performed by: INTERNAL MEDICINE

## 2023-05-15 NOTE — CARE COORDINATION
Patient evaluated in [facility] ED on 5/14/23 for MELANY. RN-CC attempted to outreach patients daughter, Nella Farley to confirm patient is under hospice care. No answer; HIPPA compliant message left through  requesting call return.

## 2023-05-16 LAB
BACTERIA UR CULT: ABNORMAL
ORGANISM: ABNORMAL

## 2023-05-18 ENCOUNTER — CARE COORDINATION (OUTPATIENT)
Dept: CARE COORDINATION | Age: 88
End: 2023-05-18

## 2023-05-18 LAB — BACTERIA BLD CULT: NORMAL

## 2023-05-18 NOTE — CARE COORDINATION
RN-CC outreached patients daughter Mirna Fuller. RN-CC confirmed patient remains under hospice care. No further outreach indicated.

## 2023-05-18 NOTE — ED PROVIDER NOTES
appetite    4. Acute cystitis without hematuria    5.  Hospice care patient          Chemo Ferrer MD  Attending Emergency Physician        Chemo Ferrer MD  05/18/23 [id number]

## 2023-05-29 ENCOUNTER — HOSPITAL ENCOUNTER (INPATIENT)
Age: 88
LOS: 2 days | Discharge: HOSPICE | DRG: 682 | End: 2023-06-01
Attending: EMERGENCY MEDICINE | Admitting: INTERNAL MEDICINE
Payer: MEDICARE

## 2023-05-29 DIAGNOSIS — N17.9 AKI (ACUTE KIDNEY INJURY) (HCC): Primary | ICD-10-CM

## 2023-05-29 DIAGNOSIS — R53.1 GENERAL WEAKNESS: ICD-10-CM

## 2023-05-29 PROCEDURE — 93005 ELECTROCARDIOGRAM TRACING: CPT | Performed by: EMERGENCY MEDICINE

## 2023-05-29 PROCEDURE — 96360 HYDRATION IV INFUSION INIT: CPT

## 2023-05-29 PROCEDURE — 96361 HYDRATE IV INFUSION ADD-ON: CPT

## 2023-05-29 PROCEDURE — 99285 EMERGENCY DEPT VISIT HI MDM: CPT

## 2023-05-29 ASSESSMENT — PAIN - FUNCTIONAL ASSESSMENT: PAIN_FUNCTIONAL_ASSESSMENT: NONE - DENIES PAIN

## 2023-05-30 ENCOUNTER — APPOINTMENT (OUTPATIENT)
Dept: CT IMAGING | Age: 88
DRG: 682 | End: 2023-05-30
Payer: MEDICARE

## 2023-05-30 ENCOUNTER — APPOINTMENT (OUTPATIENT)
Dept: GENERAL RADIOLOGY | Age: 88
DRG: 682 | End: 2023-05-30
Payer: MEDICARE

## 2023-05-30 PROBLEM — N18.9 ACUTE KIDNEY INJURY SUPERIMPOSED ON CKD (HCC): Status: ACTIVE | Noted: 2023-05-30

## 2023-05-30 PROBLEM — G93.41 ACUTE METABOLIC ENCEPHALOPATHY: Status: ACTIVE | Noted: 2023-05-30

## 2023-05-30 PROBLEM — I95.9 HYPOTENSION: Status: ACTIVE | Noted: 2023-05-30

## 2023-05-30 PROBLEM — R53.1 ASTHENIA: Status: ACTIVE | Noted: 2023-05-30

## 2023-05-30 PROBLEM — N17.9 ACUTE KIDNEY INJURY SUPERIMPOSED ON CKD (HCC): Status: ACTIVE | Noted: 2023-05-30

## 2023-05-30 LAB
ALBUMIN SERPL-MCNC: 2.7 G/DL (ref 3.4–5)
ALBUMIN/GLOB SERPL: 0.8 {RATIO} (ref 1.1–2.2)
ALP SERPL-CCNC: 91 U/L (ref 40–129)
ALT SERPL-CCNC: 30 U/L (ref 10–40)
AMMONIA PLAS-SCNC: 11 UMOL/L (ref 16–60)
ANION GAP SERPL CALCULATED.3IONS-SCNC: 11 MMOL/L (ref 3–16)
AST SERPL-CCNC: 34 U/L (ref 15–37)
BACTERIA URNS QL MICRO: ABNORMAL /HPF
BASOPHILS # BLD: 0 K/UL (ref 0–0.2)
BASOPHILS # BLD: 0 K/UL (ref 0–0.2)
BASOPHILS NFR BLD: 0 %
BASOPHILS NFR BLD: 0.2 %
BILIRUB SERPL-MCNC: 0.5 MG/DL (ref 0–1)
BILIRUB UR QL STRIP.AUTO: ABNORMAL
BUN SERPL-MCNC: 67 MG/DL (ref 7–20)
CALCIUM SERPL-MCNC: 8.6 MG/DL (ref 8.3–10.6)
CHLORIDE SERPL-SCNC: 98 MMOL/L (ref 99–110)
CLARITY UR: ABNORMAL
CO2 SERPL-SCNC: 26 MMOL/L (ref 21–32)
COLOR UR: ABNORMAL
CREAT SERPL-MCNC: 4 MG/DL (ref 0.8–1.3)
DEPRECATED RDW RBC AUTO: 12.9 % (ref 12.4–15.4)
DEPRECATED RDW RBC AUTO: 13.1 % (ref 12.4–15.4)
EKG ATRIAL RATE: 79 BPM
EKG DIAGNOSIS: NORMAL
EKG P AXIS: 44 DEGREES
EKG P-R INTERVAL: 166 MS
EKG Q-T INTERVAL: 384 MS
EKG QRS DURATION: 90 MS
EKG QTC CALCULATION (BAZETT): 440 MS
EKG R AXIS: -1 DEGREES
EKG T AXIS: 65 DEGREES
EKG VENTRICULAR RATE: 79 BPM
EOSINOPHIL # BLD: 0 K/UL (ref 0–0.6)
EOSINOPHIL # BLD: 0 K/UL (ref 0–0.6)
EOSINOPHIL NFR BLD: 0 %
EOSINOPHIL NFR BLD: 0 %
EPI CELLS #/AREA URNS AUTO: 1 /HPF (ref 0–5)
FOLATE SERPL-MCNC: >20 NG/ML (ref 4.78–24.2)
GFR SERPLBLD CREATININE-BSD FMLA CKD-EPI: 14 ML/MIN/{1.73_M2}
GLUCOSE SERPL-MCNC: 148 MG/DL (ref 70–99)
GLUCOSE UR STRIP.AUTO-MCNC: NEGATIVE MG/DL
HCT VFR BLD AUTO: 27.5 % (ref 40.5–52.5)
HCT VFR BLD AUTO: 31.9 % (ref 40.5–52.5)
HGB BLD-MCNC: 10.6 G/DL (ref 13.5–17.5)
HGB BLD-MCNC: 9.1 G/DL (ref 13.5–17.5)
HGB UR QL STRIP.AUTO: ABNORMAL
HYALINE CASTS #/AREA URNS AUTO: 5 /LPF (ref 0–8)
KETONES UR STRIP.AUTO-MCNC: ABNORMAL MG/DL
LACTATE BLDV-SCNC: 0.9 MMOL/L (ref 0.4–1.9)
LACTATE BLDV-SCNC: 2 MMOL/L (ref 0.4–1.9)
LEUKOCYTE ESTERASE UR QL STRIP.AUTO: ABNORMAL
LYMPHOCYTES # BLD: 0.7 K/UL (ref 1–5.1)
LYMPHOCYTES # BLD: 1 K/UL (ref 1–5.1)
LYMPHOCYTES NFR BLD: 12.3 %
LYMPHOCYTES NFR BLD: 8 %
MCH RBC QN AUTO: 29.6 PG (ref 26–34)
MCH RBC QN AUTO: 29.9 PG (ref 26–34)
MCHC RBC AUTO-ENTMCNC: 32.9 G/DL (ref 31–36)
MCHC RBC AUTO-ENTMCNC: 33.1 G/DL (ref 31–36)
MCV RBC AUTO: 89.5 FL (ref 80–100)
MCV RBC AUTO: 90.9 FL (ref 80–100)
METAMYELOCYTES NFR BLD MANUAL: 2 %
MONOCYTES # BLD: 0.7 K/UL (ref 0–1.3)
MONOCYTES # BLD: 0.7 K/UL (ref 0–1.3)
MONOCYTES NFR BLD: 8 %
MONOCYTES NFR BLD: 8.3 %
MYELOCYTES NFR BLD MANUAL: 4 %
NEUTROPHILS # BLD: 6.6 K/UL (ref 1.7–7.7)
NEUTROPHILS # BLD: 7.8 K/UL (ref 1.7–7.7)
NEUTROPHILS NFR BLD: 74 %
NEUTROPHILS NFR BLD: 79.2 %
NEUTS BAND NFR BLD MANUAL: 3 % (ref 0–7)
NITRITE UR QL STRIP.AUTO: NEGATIVE
PH UR STRIP.AUTO: 5 [PH] (ref 5–8)
PLATELET # BLD AUTO: 176 K/UL (ref 135–450)
PLATELET # BLD AUTO: 216 K/UL (ref 135–450)
PLATELET BLD QL SMEAR: ADEQUATE
PMV BLD AUTO: 8.3 FL (ref 5–10.5)
PMV BLD AUTO: 8.5 FL (ref 5–10.5)
POTASSIUM SERPL-SCNC: 5.6 MMOL/L (ref 3.5–5.1)
PROMYELOCYTES NFR BLD MANUAL: 1 %
PROT SERPL-MCNC: 6.3 G/DL (ref 6.4–8.2)
PROT UR STRIP.AUTO-MCNC: 30 MG/DL
RBC # BLD AUTO: 3.03 M/UL (ref 4.2–5.9)
RBC # BLD AUTO: 3.57 M/UL (ref 4.2–5.9)
RBC CLUMPS #/AREA URNS AUTO: 14 /HPF (ref 0–4)
RBC MORPH BLD: NORMAL
SLIDE REVIEW: ABNORMAL
SODIUM SERPL-SCNC: 135 MMOL/L (ref 136–145)
SP GR UR STRIP.AUTO: 1.02 (ref 1–1.03)
TSH SERPL DL<=0.005 MIU/L-ACNC: 1.61 UIU/ML (ref 0.27–4.2)
TSH SERPL DL<=0.005 MIU/L-ACNC: 2.2 UIU/ML (ref 0.27–4.2)
UA COMPLETE W REFLEX CULTURE PNL UR: ABNORMAL
UA DIPSTICK W REFLEX MICRO PNL UR: YES
URN SPEC COLLECT METH UR: ABNORMAL
UROBILINOGEN UR STRIP-ACNC: 1 E.U./DL
VIT B12 SERPL-MCNC: 326 PG/ML (ref 211–911)
WBC # BLD AUTO: 8.3 K/UL (ref 4–11)
WBC # BLD AUTO: 9.3 K/UL (ref 4–11)
WBC #/AREA URNS AUTO: 2 /HPF (ref 0–5)

## 2023-05-30 PROCEDURE — 97166 OT EVAL MOD COMPLEX 45 MIN: CPT

## 2023-05-30 PROCEDURE — 82607 VITAMIN B-12: CPT

## 2023-05-30 PROCEDURE — 97535 SELF CARE MNGMENT TRAINING: CPT

## 2023-05-30 PROCEDURE — 82746 ASSAY OF FOLIC ACID SERUM: CPT

## 2023-05-30 PROCEDURE — 97530 THERAPEUTIC ACTIVITIES: CPT

## 2023-05-30 PROCEDURE — 1200000000 HC SEMI PRIVATE

## 2023-05-30 PROCEDURE — 84443 ASSAY THYROID STIM HORMONE: CPT

## 2023-05-30 PROCEDURE — 6360000002 HC RX W HCPCS: Performed by: INTERNAL MEDICINE

## 2023-05-30 PROCEDURE — 6370000000 HC RX 637 (ALT 250 FOR IP): Performed by: NURSE PRACTITIONER

## 2023-05-30 PROCEDURE — 81001 URINALYSIS AUTO W/SCOPE: CPT

## 2023-05-30 PROCEDURE — 82140 ASSAY OF AMMONIA: CPT

## 2023-05-30 PROCEDURE — 2580000003 HC RX 258: Performed by: NURSE PRACTITIONER

## 2023-05-30 PROCEDURE — 83605 ASSAY OF LACTIC ACID: CPT

## 2023-05-30 PROCEDURE — 36415 COLL VENOUS BLD VENIPUNCTURE: CPT

## 2023-05-30 PROCEDURE — 2580000003 HC RX 258: Performed by: INTERNAL MEDICINE

## 2023-05-30 PROCEDURE — 85025 COMPLETE CBC W/AUTO DIFF WBC: CPT

## 2023-05-30 PROCEDURE — 97162 PT EVAL MOD COMPLEX 30 MIN: CPT

## 2023-05-30 PROCEDURE — 71045 X-RAY EXAM CHEST 1 VIEW: CPT

## 2023-05-30 PROCEDURE — 93010 ELECTROCARDIOGRAM REPORT: CPT | Performed by: INTERNAL MEDICINE

## 2023-05-30 PROCEDURE — 99285 EMERGENCY DEPT VISIT HI MDM: CPT

## 2023-05-30 PROCEDURE — 70450 CT HEAD/BRAIN W/O DYE: CPT

## 2023-05-30 PROCEDURE — 6370000000 HC RX 637 (ALT 250 FOR IP): Performed by: INTERNAL MEDICINE

## 2023-05-30 PROCEDURE — 72125 CT NECK SPINE W/O DYE: CPT

## 2023-05-30 PROCEDURE — 80053 COMPREHEN METABOLIC PANEL: CPT

## 2023-05-30 RX ORDER — ONDANSETRON 2 MG/ML
4 INJECTION INTRAMUSCULAR; INTRAVENOUS EVERY 6 HOURS PRN
Status: DISCONTINUED | OUTPATIENT
Start: 2023-05-30 | End: 2023-06-01 | Stop reason: HOSPADM

## 2023-05-30 RX ORDER — QUETIAPINE FUMARATE 25 MG/1
50 TABLET, FILM COATED ORAL 2 TIMES DAILY
Status: DISCONTINUED | OUTPATIENT
Start: 2023-05-30 | End: 2023-05-30

## 2023-05-30 RX ORDER — HEPARIN SODIUM 5000 [USP'U]/ML
5000 INJECTION, SOLUTION INTRAVENOUS; SUBCUTANEOUS EVERY 8 HOURS SCHEDULED
Status: DISCONTINUED | OUTPATIENT
Start: 2023-05-30 | End: 2023-06-01 | Stop reason: HOSPADM

## 2023-05-30 RX ORDER — SODIUM CHLORIDE 9 MG/ML
INJECTION, SOLUTION INTRAVENOUS PRN
Status: DISCONTINUED | OUTPATIENT
Start: 2023-05-30 | End: 2023-06-01 | Stop reason: HOSPADM

## 2023-05-30 RX ORDER — MEMANTINE HYDROCHLORIDE 5 MG/1
5 TABLET ORAL DAILY
Status: DISCONTINUED | OUTPATIENT
Start: 2023-05-30 | End: 2023-06-01 | Stop reason: HOSPADM

## 2023-05-30 RX ORDER — LANOLIN ALCOHOL/MO/W.PET/CERES
6 CREAM (GRAM) TOPICAL EVERY EVENING
Status: DISCONTINUED | OUTPATIENT
Start: 2023-05-30 | End: 2023-06-01 | Stop reason: HOSPADM

## 2023-05-30 RX ORDER — LISINOPRIL 10 MG/1
10 TABLET ORAL DAILY
Status: DISCONTINUED | OUTPATIENT
Start: 2023-05-30 | End: 2023-05-30

## 2023-05-30 RX ORDER — ACETAMINOPHEN 325 MG/1
650 TABLET ORAL EVERY 6 HOURS PRN
Status: DISCONTINUED | OUTPATIENT
Start: 2023-05-30 | End: 2023-06-01 | Stop reason: HOSPADM

## 2023-05-30 RX ORDER — QUETIAPINE FUMARATE 100 MG/1
100 TABLET, FILM COATED ORAL 2 TIMES DAILY
Status: DISCONTINUED | OUTPATIENT
Start: 2023-05-31 | End: 2023-06-01 | Stop reason: HOSPADM

## 2023-05-30 RX ORDER — SODIUM CHLORIDE 0.9 % (FLUSH) 0.9 %
10 SYRINGE (ML) INJECTION PRN
Status: DISCONTINUED | OUTPATIENT
Start: 2023-05-30 | End: 2023-06-01 | Stop reason: HOSPADM

## 2023-05-30 RX ORDER — SODIUM CHLORIDE 0.9 % (FLUSH) 0.9 %
5-40 SYRINGE (ML) INJECTION EVERY 12 HOURS SCHEDULED
Status: DISCONTINUED | OUTPATIENT
Start: 2023-05-30 | End: 2023-06-01 | Stop reason: HOSPADM

## 2023-05-30 RX ORDER — POLYETHYLENE GLYCOL 3350 17 G/17G
17 POWDER, FOR SOLUTION ORAL DAILY PRN
Status: DISCONTINUED | OUTPATIENT
Start: 2023-05-30 | End: 2023-06-01 | Stop reason: HOSPADM

## 2023-05-30 RX ORDER — ENOXAPARIN SODIUM 100 MG/ML
40 INJECTION SUBCUTANEOUS DAILY
Status: DISCONTINUED | OUTPATIENT
Start: 2023-05-30 | End: 2023-05-30

## 2023-05-30 RX ORDER — ACETAMINOPHEN 650 MG/1
650 SUPPOSITORY RECTAL EVERY 6 HOURS PRN
Status: DISCONTINUED | OUTPATIENT
Start: 2023-05-30 | End: 2023-06-01 | Stop reason: HOSPADM

## 2023-05-30 RX ORDER — ONDANSETRON 4 MG/1
4 TABLET, ORALLY DISINTEGRATING ORAL EVERY 8 HOURS PRN
Status: DISCONTINUED | OUTPATIENT
Start: 2023-05-30 | End: 2023-06-01 | Stop reason: HOSPADM

## 2023-05-30 RX ORDER — SODIUM CHLORIDE 9 MG/ML
INJECTION, SOLUTION INTRAVENOUS CONTINUOUS
Status: DISCONTINUED | OUTPATIENT
Start: 2023-05-30 | End: 2023-06-01 | Stop reason: HOSPADM

## 2023-05-30 RX ORDER — 0.9 % SODIUM CHLORIDE 0.9 %
1000 INTRAVENOUS SOLUTION INTRAVENOUS ONCE
Status: COMPLETED | OUTPATIENT
Start: 2023-05-30 | End: 2023-05-30

## 2023-05-30 RX ORDER — TRAZODONE HYDROCHLORIDE 50 MG/1
50 TABLET ORAL NIGHTLY PRN
Status: DISCONTINUED | OUTPATIENT
Start: 2023-05-30 | End: 2023-06-01 | Stop reason: HOSPADM

## 2023-05-30 RX ORDER — DONEPEZIL HYDROCHLORIDE 5 MG/1
5 TABLET, FILM COATED ORAL NIGHTLY
Status: DISCONTINUED | OUTPATIENT
Start: 2023-05-30 | End: 2023-06-01 | Stop reason: HOSPADM

## 2023-05-30 RX ORDER — QUETIAPINE FUMARATE 25 MG/1
50 TABLET, FILM COATED ORAL ONCE
Status: COMPLETED | OUTPATIENT
Start: 2023-05-30 | End: 2023-05-30

## 2023-05-30 RX ADMIN — MEMANTINE 5 MG: 5 TABLET ORAL at 09:12

## 2023-05-30 RX ADMIN — DONEPEZIL HYDROCHLORIDE 5 MG: 5 TABLET, FILM COATED ORAL at 21:35

## 2023-05-30 RX ADMIN — HEPARIN SODIUM 5000 UNITS: 5000 INJECTION INTRAVENOUS; SUBCUTANEOUS at 14:51

## 2023-05-30 RX ADMIN — QUETIAPINE FUMARATE 50 MG: 25 TABLET ORAL at 09:12

## 2023-05-30 RX ADMIN — SODIUM CHLORIDE: 9 INJECTION, SOLUTION INTRAVENOUS at 06:03

## 2023-05-30 RX ADMIN — SODIUM CHLORIDE: 9 INJECTION, SOLUTION INTRAVENOUS at 21:43

## 2023-05-30 RX ADMIN — SODIUM CHLORIDE, PRESERVATIVE FREE 10 ML: 5 INJECTION INTRAVENOUS at 09:13

## 2023-05-30 RX ADMIN — QUETIAPINE FUMARATE 50 MG: 25 TABLET ORAL at 18:03

## 2023-05-30 RX ADMIN — SODIUM CHLORIDE, PRESERVATIVE FREE 10 ML: 5 INJECTION INTRAVENOUS at 21:36

## 2023-05-30 RX ADMIN — HEPARIN SODIUM 5000 UNITS: 5000 INJECTION INTRAVENOUS; SUBCUTANEOUS at 21:34

## 2023-05-30 RX ADMIN — LISINOPRIL 10 MG: 10 TABLET ORAL at 09:12

## 2023-05-30 RX ADMIN — QUETIAPINE FUMARATE 50 MG: 25 TABLET ORAL at 21:35

## 2023-05-30 RX ADMIN — MELATONIN TAB 3 MG 6 MG: 3 TAB at 18:03

## 2023-05-30 RX ADMIN — SODIUM CHLORIDE 1000 ML: 9 INJECTION, SOLUTION INTRAVENOUS at 01:15

## 2023-05-30 RX ADMIN — ENOXAPARIN SODIUM 40 MG: 100 INJECTION SUBCUTANEOUS at 09:12

## 2023-05-30 ASSESSMENT — ENCOUNTER SYMPTOMS
DIARRHEA: 0
SHORTNESS OF BREATH: 0
ABDOMINAL PAIN: 0
NAUSEA: 0
VOMITING: 0
CHEST TIGHTNESS: 0

## 2023-05-30 NOTE — ED PROVIDER NOTES
[facility]        Pt Name: Carl Pickering  MRN: [id number]  [place] 1935  Date of evaluation: 5/29/2023  Provider: ADRIANA Boo - LUIS EDUARDO  PCP: Moose Hawley DO  Note Started: 12:57 AM EDT 5/30/23       I have seen and evaluated this patient with my supervising physician Toya Gottron, MD.      [address] [place] [address]       Chief Complaint   Patient presents with    Hypotension     Pt comes from home for hypotension, lethargy, and increased confusion per hospice nurse report. Pt has no c/o. HISTORY OF PRESENT ILLNESS: 1 or more Elements     History from : Patient and Family daughter, Niesha Smith, via telephone    Limitations to history : None    Carl Pickering is a 81 y.o. male who presents to the emergency department with complaint of increased confusion and lethargy from home. The patient is in home hospice care. Daughter did call 911 as she took the patient's blood pressure and noted it to be 60 over palp. Upon arrival, the patient has no complaints. He does report that he fell yesterday at [facility], and believes that he hit his head. He denies injury. He denies pain to his head, neck, or back. No pain to upper or lower extremities. Patient is aware that he is at the hospital and that is 1:00 in the morning. I did talk with Niesha Smith, via telephone, states that she is not coming up because her son is autistic, she reports that he was sick recently with urinary tract infection and did complete a full course of antibiotics. She did confirm that his CODE STATUS is a DNR CC. States that he nor she would want chest compressions, intubation, or painful/invasive procedures including central line. She is requesting blood work, UA, and imaging. Requesting iv fluids and abx if needed. Nursing Notes were all reviewed and agreed with or any disagreements were addressed in the HPI.     REVIEW OF

## 2023-05-30 NOTE — ED NOTES
Spoke with Sy Madsen who requests blood work and urine sample. [facility]      Sofi Shen RN  05/30/23 0520

## 2023-05-30 NOTE — ED NOTES
Per Hospice RN at [facility] pt to ER with low BP and increased confusion. Pt had a fall light night at respite facility with no injuries. Pt recently treated for UTI. Abx course completed on 5/25/23. Pt is a DNR-CC.      Ilir Lee RN  05/30/23 9516

## 2023-05-30 NOTE — H&P
prophylaxis Hep SQ  Code status DNR-CC  Diet General  IV access Peripheral  Watson Catheter  No    Admit as inpatient. I anticipate hospitalization spanning more than two midnights for investigation and treatment of the above medically necessary diagnoses. Discussed with patient and nursing. Attempted to discuss with daughter without success.     Maci Brown MD    5/30/2023 12:23 PM

## 2023-05-30 NOTE — ED PROVIDER NOTES
I independently performed a history and physical on Morgan Harrell. I personally saw the patient and performed a substantive portion of the visit including all aspects of the medical decision making. Briefly, this is a 81 y.o. male here for lethargy. Patient states \"why am I here at 1 in the morning\". Denies pain. No nausea or vomiting. No fever or cough. Has generalized weakness. Has not been eating or drinking well. Patient is in home hospice. .    On exam,   General: Patient is weak appearing  Skin: No cyanosis  HEENT: Dry mucous membranes  Heart: Regular rate, regular rhythm  Lung: No respiratory distress. Clear to auscultation bilaterally  Abdomen: Soft, nontender  Neuro: no facial droop, no slurred speech, answers questions appropriately        EKG  The Ekg interpreted by me in the absence of a cardiologist shows. Normal sinus rhythm  No acute ST changes   HR 79  No significant EKG changes compared to study in 5/23      Screenings   [place] Coma Scale  Eye Opening: Spontaneous  Best Verbal Response: Confused  Best Motor Response: Obeys commands  Arvind Coma Scale Score: 14        MDM  Briefly, this is a 81 y.o. male here for lethargy, decreased blood pressure at home. He is on home hospice. He did fall yesterday. No pain from the fall. He is asymptomatic with the exception of weakness. We verified that the patient is DNR CC on hospice does not want intubation, compressions, painful procedures including central line. He appears dehydrated. Will give IV fluids. Has not been eating or drinking well. Obtaining basic labs. Will check for UTI. No symptoms of pneumonia. Obtaining CT head and C-spine to look for traumatic injury. -----------    CT reveals no traumatic injuries    Patient has significant MELANY. Has mild hyperkalemia. No abdominal pain to indicate obstructive nephropathy. No UTI found. Maybe due to hypotension. remains hypotensive despite fluids.   Contacted patient's

## 2023-05-31 LAB
ANION GAP SERPL CALCULATED.3IONS-SCNC: 13 MMOL/L (ref 3–16)
BASOPHILS # BLD: 0 K/UL (ref 0–0.2)
BASOPHILS NFR BLD: 0.1 %
BUN SERPL-MCNC: 53 MG/DL (ref 7–20)
CALCIUM SERPL-MCNC: 7.9 MG/DL (ref 8.3–10.6)
CHLORIDE SERPL-SCNC: 109 MMOL/L (ref 99–110)
CO2 SERPL-SCNC: 21 MMOL/L (ref 21–32)
CREAT SERPL-MCNC: 2.1 MG/DL (ref 0.8–1.3)
DEPRECATED RDW RBC AUTO: 12.8 % (ref 12.4–15.4)
EOSINOPHIL # BLD: 0 K/UL (ref 0–0.6)
EOSINOPHIL NFR BLD: 0.1 %
GFR SERPLBLD CREATININE-BSD FMLA CKD-EPI: 30 ML/MIN/{1.73_M2}
GLUCOSE SERPL-MCNC: 136 MG/DL (ref 70–99)
HCT VFR BLD AUTO: 32.4 % (ref 40.5–52.5)
HGB BLD-MCNC: 10.6 G/DL (ref 13.5–17.5)
LYMPHOCYTES # BLD: 0.8 K/UL (ref 1–5.1)
LYMPHOCYTES NFR BLD: 14.2 %
MCH RBC QN AUTO: 29.7 PG (ref 26–34)
MCHC RBC AUTO-ENTMCNC: 32.8 G/DL (ref 31–36)
MCV RBC AUTO: 90.7 FL (ref 80–100)
MONOCYTES # BLD: 0.5 K/UL (ref 0–1.3)
MONOCYTES NFR BLD: 8.4 %
NEUTROPHILS # BLD: 4.4 K/UL (ref 1.7–7.7)
NEUTROPHILS NFR BLD: 77.2 %
PLATELET # BLD AUTO: 206 K/UL (ref 135–450)
PMV BLD AUTO: 8.2 FL (ref 5–10.5)
POTASSIUM SERPL-SCNC: 4.8 MMOL/L (ref 3.5–5.1)
RBC # BLD AUTO: 3.57 M/UL (ref 4.2–5.9)
SODIUM SERPL-SCNC: 143 MMOL/L (ref 136–145)
WBC # BLD AUTO: 5.7 K/UL (ref 4–11)

## 2023-05-31 PROCEDURE — 85025 COMPLETE CBC W/AUTO DIFF WBC: CPT

## 2023-05-31 PROCEDURE — 97535 SELF CARE MNGMENT TRAINING: CPT

## 2023-05-31 PROCEDURE — 2580000003 HC RX 258: Performed by: INTERNAL MEDICINE

## 2023-05-31 PROCEDURE — 97530 THERAPEUTIC ACTIVITIES: CPT

## 2023-05-31 PROCEDURE — 6370000000 HC RX 637 (ALT 250 FOR IP): Performed by: INTERNAL MEDICINE

## 2023-05-31 PROCEDURE — 6360000002 HC RX W HCPCS: Performed by: INTERNAL MEDICINE

## 2023-05-31 PROCEDURE — 36415 COLL VENOUS BLD VENIPUNCTURE: CPT

## 2023-05-31 PROCEDURE — 6370000000 HC RX 637 (ALT 250 FOR IP): Performed by: NURSE PRACTITIONER

## 2023-05-31 PROCEDURE — 80048 BASIC METABOLIC PNL TOTAL CA: CPT

## 2023-05-31 PROCEDURE — 1200000000 HC SEMI PRIVATE

## 2023-05-31 RX ADMIN — QUETIAPINE FUMARATE 100 MG: 100 TABLET ORAL at 09:22

## 2023-05-31 RX ADMIN — ONDANSETRON 4 MG: 2 INJECTION INTRAMUSCULAR; INTRAVENOUS at 06:14

## 2023-05-31 RX ADMIN — SODIUM CHLORIDE, PRESERVATIVE FREE 10 ML: 5 INJECTION INTRAVENOUS at 09:22

## 2023-05-31 RX ADMIN — HEPARIN SODIUM 5000 UNITS: 5000 INJECTION INTRAVENOUS; SUBCUTANEOUS at 06:04

## 2023-05-31 RX ADMIN — MEMANTINE 5 MG: 5 TABLET ORAL at 09:22

## 2023-05-31 RX ADMIN — ONDANSETRON 4 MG: 4 TABLET, ORALLY DISINTEGRATING ORAL at 22:03

## 2023-05-31 RX ADMIN — HEPARIN SODIUM 5000 UNITS: 5000 INJECTION INTRAVENOUS; SUBCUTANEOUS at 21:59

## 2023-05-31 RX ADMIN — QUETIAPINE FUMARATE 100 MG: 100 TABLET ORAL at 18:15

## 2023-05-31 RX ADMIN — DONEPEZIL HYDROCHLORIDE 5 MG: 5 TABLET, FILM COATED ORAL at 22:01

## 2023-05-31 RX ADMIN — MELATONIN TAB 3 MG 6 MG: 3 TAB at 18:15

## 2023-06-01 VITALS
OXYGEN SATURATION: 95 % | HEART RATE: 89 BPM | RESPIRATION RATE: 16 BRPM | WEIGHT: 198 LBS | TEMPERATURE: 98.4 F | HEIGHT: 72 IN | BODY MASS INDEX: 26.82 KG/M2 | DIASTOLIC BLOOD PRESSURE: 75 MMHG | SYSTOLIC BLOOD PRESSURE: 118 MMHG

## 2023-06-01 LAB
ANION GAP SERPL CALCULATED.3IONS-SCNC: 8 MMOL/L (ref 3–16)
BASOPHILS # BLD: 0 K/UL (ref 0–0.2)
BASOPHILS NFR BLD: 0.1 %
BUN SERPL-MCNC: 44 MG/DL (ref 7–20)
CALCIUM SERPL-MCNC: 7.9 MG/DL (ref 8.3–10.6)
CHLORIDE SERPL-SCNC: 112 MMOL/L (ref 99–110)
CO2 SERPL-SCNC: 24 MMOL/L (ref 21–32)
CREAT SERPL-MCNC: 1.4 MG/DL (ref 0.8–1.3)
DEPRECATED RDW RBC AUTO: 12.9 % (ref 12.4–15.4)
EOSINOPHIL # BLD: 0 K/UL (ref 0–0.6)
EOSINOPHIL NFR BLD: 0.1 %
GFR SERPLBLD CREATININE-BSD FMLA CKD-EPI: 49 ML/MIN/{1.73_M2}
GLUCOSE SERPL-MCNC: 153 MG/DL (ref 70–99)
HCT VFR BLD AUTO: 33.7 % (ref 40.5–52.5)
HGB BLD-MCNC: 10.8 G/DL (ref 13.5–17.5)
LYMPHOCYTES # BLD: 0.7 K/UL (ref 1–5.1)
LYMPHOCYTES NFR BLD: 11.1 %
MCH RBC QN AUTO: 29.4 PG (ref 26–34)
MCHC RBC AUTO-ENTMCNC: 32.1 G/DL (ref 31–36)
MCV RBC AUTO: 91.4 FL (ref 80–100)
MONOCYTES # BLD: 0.5 K/UL (ref 0–1.3)
MONOCYTES NFR BLD: 8.6 %
NEUTROPHILS # BLD: 5 K/UL (ref 1.7–7.7)
NEUTROPHILS NFR BLD: 80.1 %
PLATELET # BLD AUTO: 198 K/UL (ref 135–450)
PMV BLD AUTO: 8.5 FL (ref 5–10.5)
POTASSIUM SERPL-SCNC: 4.3 MMOL/L (ref 3.5–5.1)
RBC # BLD AUTO: 3.69 M/UL (ref 4.2–5.9)
SODIUM SERPL-SCNC: 144 MMOL/L (ref 136–145)
WBC # BLD AUTO: 6.2 K/UL (ref 4–11)

## 2023-06-01 PROCEDURE — 80048 BASIC METABOLIC PNL TOTAL CA: CPT

## 2023-06-01 PROCEDURE — 6360000002 HC RX W HCPCS: Performed by: INTERNAL MEDICINE

## 2023-06-01 PROCEDURE — 6370000000 HC RX 637 (ALT 250 FOR IP): Performed by: NURSE PRACTITIONER

## 2023-06-01 PROCEDURE — 51798 US URINE CAPACITY MEASURE: CPT

## 2023-06-01 PROCEDURE — 6370000000 HC RX 637 (ALT 250 FOR IP): Performed by: INTERNAL MEDICINE

## 2023-06-01 PROCEDURE — 85025 COMPLETE CBC W/AUTO DIFF WBC: CPT

## 2023-06-01 RX ORDER — LISINOPRIL 10 MG/1
10 TABLET ORAL DAILY
Qty: 30 TABLET | Refills: 0
Start: 2023-06-05

## 2023-06-01 RX ADMIN — MEMANTINE 5 MG: 5 TABLET ORAL at 09:23

## 2023-06-01 RX ADMIN — QUETIAPINE FUMARATE 100 MG: 100 TABLET ORAL at 09:23

## 2023-06-01 RX ADMIN — ONDANSETRON 4 MG: 2 INJECTION INTRAMUSCULAR; INTRAVENOUS at 05:22

## 2023-06-01 RX ADMIN — ACETAMINOPHEN 650 MG: 325 TABLET ORAL at 09:36

## 2023-06-01 RX ADMIN — HEPARIN SODIUM 5000 UNITS: 5000 INJECTION INTRAVENOUS; SUBCUTANEOUS at 14:20

## 2023-06-01 RX ADMIN — HEPARIN SODIUM 5000 UNITS: 5000 INJECTION INTRAVENOUS; SUBCUTANEOUS at 05:22

## 2023-06-01 ASSESSMENT — PAIN SCALES - PAIN ASSESSMENT IN ADVANCED DEMENTIA (PAINAD)
BODYLANGUAGE: 0
CONSOLABILITY: 0
FACIALEXPRESSION: 0
NEGVOCALIZATION: 0
NEGVOCALIZATION: 0
TOTALSCORE: 0
FACIALEXPRESSION: 0
BREATHING: 0
CONSOLABILITY: 0
BODYLANGUAGE: 0
CONSOLABILITY: 0
CONSOLABILITY: 0
BREATHING: 0
BREATHING: 0
FACIALEXPRESSION: 0
FACIALEXPRESSION: 0
TOTALSCORE: 0
TOTALSCORE: 0
BREATHING: 0
BODYLANGUAGE: 0
TOTALSCORE: 0
BODYLANGUAGE: 0
NEGVOCALIZATION: 0
NEGVOCALIZATION: 0

## 2023-06-01 NOTE — CARE COORDINATION
Discharge Planning:     (CM) called and spoke with patient's Daughter about discharge today. Daughter confirmed that she would like patient to return home with [place] [address] services. Daughter agreed to [facility] contacting Hospice to assist with patient's discharge. Daughter agreeable to a 5:00pm transport time. Daughter reported 1 step at home and confirmed patient lives with her and the address is as follows:    [address] [facility], [address] [facility] called [place] [address] ([phone]) and spoke with staff, Sheryl York, who agreed to have Hospice staff Hillary Morales) call CM back. DOMINIK Griffin, [facility]  [facility] -   [phone]    Electronically signed by SONDRA Gagnon on 6/1/2023 at 11:10 AM        Updated at [phone]:  CM spoke with [place] [address] staff, Stephanie ([phone]), who confirmed that patient is still active in their hospice services as Daughter will be signing a form that Hospice is not liable for hospital visit as it was not recommended by the Hospice Physician. Stephanie requested that CM inform her when transport is scheduled for so she can be at the patient's home for discharge. Patient scheduled with [address]  at 4:30pm today. Patient's Daughter and [place] [address] staff, Stephanie, agreed to this plan of action.       DOMINIK Griffin, [facility] -   [phone]    Electronically signed by SONDRA Gagnon on 6/1/2023 at 11:54 AM
the following:, Bathing, Toileting, Cooking, Housework, Shopping, Mobility (Daughters assists with ADL's)    PT AM-PAC: 16 /24  OT AM-PAC: 16 /24    Family can provide assistance at DC: Yes (Daughter cares for patient.)  Would you like Case Management to discuss the discharge plan with any other family members/significant others, and if so, who? Yes (Daughter cares for patient.)  Plans to Return to Present Housing: Yes (Continue Hospice services through [address] [facility].)  Other Identified Issues/Barriers to RETURNING to current housing: N/A  Potential Assistance needed at discharge: N/A            Potential DME:    Patient expects to discharge to: [address] [place] [address] for transportation at discharge: Family    Financial    Payor: [address] [place] [address] / Plan: MEDICARE PART A AND B / Product Type: *No Product type* /     Does insurance require precert for SNF: No    Potential assistance Purchasing Medications: No  Meds-to-Beds request:        [facility]  [id number] [place] [address]  Phone: [phone] Fax: [phone]      Notes:    Factors facilitating achievement of predicted outcomes: Family support, Caregiver support, Has needed Durable Medical Equipment at home, and Connected with Hospice Services    Barriers to discharge: Current medical concerns need resolved. Additional Case Management Notes:  (CM) called and spoke with the patient's Daughter, Adi Randhawa (on emergency contact list), who reported that she is the patient's caregiver and that the patient lives with her and her adult Son who has Autism (patient's Grandson). CM discussed with Daughter therapy recommendations for a skilled nursing facility (SNF) placement. Daughter declined and reported that the patient will be returning home and continue Hospice services through [address].  Daughter agreeable to CM calling hospice to inform of this plan

## 2023-06-01 NOTE — PROGRESS NOTES
4 Eyes Skin Assessment     NAME:  Morgan Harrell  YOB: 1935  MEDICAL RECORD NUMBER:  [id number]    The patient is being assessed for  Admission    I agree that at least one RN has performed a thorough Head to Toe Skin Assessment on the patient. ALL assessment sites listed below have been assessed. Areas assessed by both nurses:    Head, Face, Ears, Shoulders, Back, Chest, Arms, Elbows, Hands, Sacrum. Buttock, Coccyx, Ischium, Legs. Feet and Heels, and Under Medical Devices         Does the Patient have a Wound?  No noted wound(s)       Benjamín Prevention initiated by RN: No  Wound Care Orders initiated by RN: No    Pressure Injury (Stage 3,4, Unstageable, DTI, NWPT, and Complex wounds) if present, place Wound referral order by RN under : No    New Ostomies, if present place, Ostomy referral order under : No     Nurse 1 eSignature: Electronically signed by Mirella Rosario RN on 5/30/23 at 10:20 AM EDT    **SHARE this note so that the co-signing nurse can place an eSignature**    Nurse 2 eSignature: Electronically signed by Gerber Zuniga RN on 5/30/23 at 11:28 AM EDT
Arrived to place PIV for pt. Ultrasound guided 20 G PIV placed in the Right Forearm without difficulty. Blood return achieved and line flushed without resistance. Thank you for allowing our care and services.
Data- discharge order received, pt's daughter verbalized agreement to discharge, disposition to previous residence, with [facility]   Diet- Regular, Activity- up as tolerated, Primary Care Physician as follows: [facility], [facility] f/u appointment in one week, immunizations reviewed and updated, prescription medications filled: NA. Inpatient surgical procedure precautions reviewed: none CHF Education reviewed. Pt/ Family has had a total of 60 minutes CHF education this admission encounter. 1. WEIGHT: Admit Weight - Scale: 201 lb 9.6 oz (91.4 kg) (05/30/23 1240)        Today  Weight - Scale: 198 lb (89.8 kg) (06/01/23 9166)       2. O2 SAT.: SpO2: 95 % (06/01/23 8571)    Response- Pt belongings gathered, IV removed. Disposition is home ( with Hospice), transported with belongings and discharge package.
Hospitalist Progress Note      PCP: Sb Matute DO    Date of Admission: 5/29/2023    Chief Complaint: Hypotension    Hospital Course: 81 y.o. male with history of HTN, Alzheimer's dementia who is active with [facility] hospice came to ER with altered mental status. Patient was seen in ED on 5/14/23 in ER and treated with Keflex. Patient brought to ER by daughter for hypotension. Patient with SBP in 60s and Cr 4.0 in ED. Admitted as inpatient for MELANY, hypotension and acute metabolic encephalopathy. Started on IVF. Subjective:  Patient is awake. Disoriented to time and place. No CP, SOB, HA or abdominal pain. Medications:  Reviewed    Infusion Medications    sodium chloride      sodium chloride 75 mL/hr at 05/30/23 2143     Scheduled Medications    donepezil  5 mg Oral Nightly    melatonin  6 mg Oral QPM    memantine  5 mg Oral Daily    sodium chloride flush  5-40 mL IntraVENous 2 times per day    heparin (porcine)  5,000 Units SubCUTAneous 3 times per day    QUEtiapine  100 mg Oral BID     PRN Meds: traZODone, sodium chloride flush, sodium chloride, ondansetron **OR** ondansetron, polyethylene glycol, acetaminophen **OR** acetaminophen      Intake/Output Summary (Last 24 hours) at 5/31/2023 2042  Last data filed at 5/31/2023 3823  Gross per 24 hour   Intake --   Output 350 ml   Net -350 ml       Physical Exam Performed:    BP (!) 159/97   Pulse 73   Temp 98.2 °F (36.8 °C) (Oral)   Resp 16   Ht 5' 11.5\" (1.816 m)   Wt 202 lb 9.6 oz (91.9 kg)   SpO2 95%   BMI 27.86 kg/m²     General appearance: No apparent distress, appears stated age and cooperative. HEENT: Pupils equal, round, and reactive to light. Conjunctivae/corneas clear. Neck: Supple, with full range of motion. No jugular venous distention. Trachea midline. Respiratory:  Normal respiratory effort. Clear to auscultation, bilaterally without Rales/Wheezes/Rhonchi.   Cardiovascular: Regular rate and rhythm with normal S1/S2
Pts Daughter Rick Valadez [phone] and Hospice nurse Anna Garcia [phone] notified of the discharge.
Shift assessment completed. VSS, scheduled meds given/held per MAR orders. Pt is confused at times. Pt has no complaints or needs at this time. The pts daughter has a lot of questions and concerns, has been calling all night about updates and making sure he has been getting his medications. Gave updates and messaged hospitalist about eye drops that were not ordered. Brakes locked, bed in lowest position, bed alarm on and ext cath placed. All belongings and call light within reach.
Shift assessment completed. VSS, scheduled meds given/held per MAR orders. Pt is confused but is alert to person. Pt was feeling nauseated so with meds we also gave Zofran. Brakes locked, bed in lowest position, bed alarm on. All belongings and call light within reach.
[facility] [address] Department   Phone: [phone]    Occupational Therapy    [] Initial Evaluation            [x] Daily Treatment Note         [] Discharge Summary      Patient: Yannick Andrade   : 1935   MRN: [id number]   Date of Service:  2023    Admitting Diagnosis:  MELANY (acute kidney injury) [facility])  Current Admission Summary: Briefly, this is a 81 y.o. male here for lethargy, decreased blood pressure at home. He is on home hospice. He did fall yesterday. No pain from the fall. He is asymptomatic with the exception of weakness. We verified that the patient is DNR CC on hospice does not want intubation, compressions, painful procedures including central line. He appears dehydrated. Will give IV fluids. Has not been eating or drinking well. Obtaining basic labs. Will check for UTI. No symptoms of pneumonia. Obtaining CT head and C-spine to look for traumatic injury. -----------     CT reveals no traumatic injuries     Patient has significant MELANY. Has mild hyperkalemia. No abdominal pain to indicate obstructive nephropathy. No UTI found. Maybe due to hypotension. remains hypotensive despite fluids. Contacted patient's daughter who requested that we rescind hospice care so that he may be admitted to the hospital for fluids. Since he is comfort care, wont place central line with pressors. Past Medical History:  has a past medical history of Benign prostatic hypertrophy without urinary obstruction, Bradycardia, COPD (chronic obstructive pulmonary disease) ([address] Dementia ([facility] [address] Glaucoma, Gout, Hyperlipidemia, Hypertension, Hypoxemia, Macular degeneration disease, Osteoarthritis, Sleep apnea, and Unspecified sleep apnea. Past Surgical History:  has a past surgical history that includes Total hip arthroplasty (2005); joint replacement; TURP; knee surgery; and Colonoscopy.     Discharge Recommendations: Yannick Andrade scored a 15/24 on the AM-PAC
[facility] [address] Department   Phone: [phone]    Occupational Therapy    [x] Initial Evaluation            [] Daily Treatment Note         [] Discharge Summary      Patient: Allyson Salazar   : 1935   MRN: [id number]   Date of Service:  2023    Admitting Diagnosis:  Asthenia  Current Admission Summary: Briefly, this is a 81 y.o. male here for lethargy, decreased blood pressure at home. He is on home hospice. He did fall yesterday. No pain from the fall. He is asymptomatic with the exception of weakness. We verified that the patient is DNR CC on hospice does not want intubation, compressions, painful procedures including central line. He appears dehydrated. Will give IV fluids. Has not been eating or drinking well. Obtaining basic labs. Will check for UTI. No symptoms of pneumonia. Obtaining CT head and C-spine to look for traumatic injury. -----------     CT reveals no traumatic injuries     Patient has significant MELANY. Has mild hyperkalemia. No abdominal pain to indicate obstructive nephropathy. No UTI found. Maybe due to hypotension. remains hypotensive despite fluids. Contacted patient's daughter who requested that we rescind hospice care so that he may be admitted to the hospital for fluids. Since he is comfort care, wont place central line with pressors. Past Medical History:  has a past medical history of Benign prostatic hypertrophy without urinary obstruction, Bradycardia, COPD (chronic obstructive pulmonary disease) ([facility] [address] Dementia ([facility] [address] Glaucoma, Gout, Hyperlipidemia, Hypertension, Hypoxemia, Macular degeneration disease, Osteoarthritis, Sleep apnea, and Unspecified sleep apnea. Past Surgical History:  has a past surgical history that includes Total hip arthroplasty (2005); joint replacement; TURP; knee surgery; and Colonoscopy. Discharge Recommendations: Allyson Salazar scored a 17/24 on the AM-PAC ADL Inpatient form.
No  Hand Dominance: [] Left                 [x] Right  Current Employment: retired. Occupation: real estate  Hobbies: golf with son every weekend  Recent Falls: no recent falls. Subjective  General: Patient denies being on hospice care, states he was living at home independently. He cannot say why he came to the hospital. Pt agreeable to PT/OT treatment   Pain: 0/10  Pain Interventions: not applicable       Functional Mobility  Bed Mobility  Supine to Sit: stand by assistance  Sit to Supine: stand by assistance  Scooting: stand by assistance  Comments:  Transfers  No transfers completed on this date secondary to orthostatic hypotension. Comments:  see activity tolerance for vitals   Ambulation  Ambulation not tested on this date secondary to orthostatic hypotension. Distance: N/A  Gait Mechanics: N/A  Comments:    Stair Mobility  Stair mobility not completed on this date. Comments:  Wheelchair Mobility:  No w/c mobility completed on this date. Comments:  Balance  Static Sitting Balance: fair (+): maintains balance at SBA/supervision without use of UE support  Dynamic Sitting Balance: fair (+): maintains balance at SBA/supervision without use of UE support  Comments: Pt able to tolerate sitting EOB ~5 mins before requesting to lay back down due to reports of dizziness and nausea     Other Therapeutic Interventions  Supine exercise:   Ankle pumps x10   Heel slides x5   SLR x5     Functional Outcomes  AM-PAC Inpatient Mobility Raw Score : 16              Cognition  Overall Cognitive Status: Impaired  Arousal/Alertness: appropriate responses to stimuli  Following Commands: follows all commands without difficulty  Attention Span: appears intact  Memory: appears intact  Safety Judgement: decreased awareness of need for assistance, decreased awareness of need for safety  Problem Solving: assistance required to generate solutions, assistance required to implement solutions  Insights: decreased awareness of
supervision at d/c. Safety Interventions: patient left in bed, bed alarm in place, call light within reach, patient at risk for falls, and nurse notified    Plan  Frequency: 3-5 x/per week  Current Treatment Recommendations: strengthening, balance training, functional mobility training, transfer training, endurance training, patient/caregiver education, cognitive reorientation, safety education, and equipment evaluation/education    Goals  Patient Goals: None verbalized. Short Term Goals:  Time Frame: Discharged. Patient will complete bed mobility at modified independent   Patient will complete transfers at contact guard assistance   Patient will ambulate 25 ft with use of rolling walker at contact guard assistance     Above goals reviewed on 5/30/2023. All goals are ongoing at this time unless indicated above.     Therapy Session Time      Individual Group Co-treatment   Time In     0815   Time Out     2920   Minutes     38     Timed Code Treatment Minutes:  23 Minutes  Total Treatment Minutes:  38       Electronically Signed By: Princess White PT, DPT, ATC-R [id number]

## 2023-06-01 NOTE — DISCHARGE SUMMARY
Discharge Summary  Angel Forte MD     Name:  Ken Apodaca /Age/Sex: 1935  (81 y.o. male)   MRN & CSN:  [id number] & [id number] Admission Date/Time: 2023 11:37 PM   Attending:  Angel Forte MD Discharging Physician: Angel Forte MD     Hospital Course:     81 y.o. male with history of HTN, Alzheimer's dementia who is active with [facility] hospice came to ER with altered mental status. Patient was seen in ED on 23 in ER and treated with Keflex. Patient brought to ER by daughter for hypotension. Patient with SBP in 60s and Cr 4.0 in ED. Admitted as inpatient for MELANY, hypotension and acute metabolic encephalopathy. Started on IVF, rapid improvement in creatinine and mentation is back to baseline. MELANY Pre-renal, Mx with IV fluids, Cr peaked at 4 now down to 1.4. Cont to hold lisinipril upon dc and restart in 5 days. Hypotension. Improved. /75 today while off of lisinopril  l  Acute metabolic encephalopathy. Due to above, now resolved  Alzheimer's dementia with behavior changes. Continue Namenda, Aricept and Seroquel  BPH. Monitor for urinary retention      Patient seen this morning, awake alert oriented x1. Asking for more food although finished breakfast.          The patient expressed appropriate understanding of and agreement with the discharge recommendations, medications, and plan. Consults this admission:  None    Discharge Instruction:     Follow up appointments:     No follow-up provider specified. Primary care physician:  within 2 weeks    Diet:  regular diet     Activity: activity as tolerated    Disposition: Discharged to:   [x]Home, [][facility], []SNF, []Acute Rehab, []Hospice     Condition on discharge: Stable    Discharge Medications:        Medication List        CHANGE how you take these medications      lisinopril 10 MG tablet  Commonly known as: PRINIVIL;ZESTRIL  Take 1 tablet by mouth daily  Start taking on: 2023  What changed:  These

## 2023-06-01 NOTE — DISCHARGE INSTR - COC
Continuity of Care Form    Patient Name: Ken Apodaca   :  1935  MRN:  [id number]    Admit date:  2023  Discharge date:  ***    Code Status Order: DNR-CC   Advance Directives:     Admitting Physician:  Mary Dukes MD  PCP: Tish Baez DO    Discharging Nurse: [facility] Unit/Room#: 1XN-2431/2669-12  Discharging Unit Phone Number: ***    Emergency Contact:   Extended Emergency Contact Information  Primary Emergency Contact: [address] [place] [address] Street Phone: [phone]  Mobile Phone: [phone]  Relation: Child  Preferred language: English  Secondary Emergency Contact: Rox[address]  Mobile Phone: [phone]  Relation: Child  Preferred language: English   needed?  No    Past Surgical History:  Past Surgical History:   Procedure Laterality Date    COLONOSCOPY      JOINT REPLACEMENT      hip blateral    KNEE SURGERY      TOTAL HIP ARTHROPLASTY  2005    TURP         Immunization History:   Immunization History   Administered Date(s) Administered    COVID-19, PFIZER PURPLE top, DILUTE for use, (age 15 y+), 30mcg/0.3mL 2021, 2021    Influenza A (D1O9-89) Vaccine PF IM 2009    Influenza Vaccine, unspecified formulation 2016    Influenza Virus Vaccine 10/24/2011, 2015    Influenza Whole 2015    Influenza, High Dose (Fluzone 65 yrs and older) 2016, 10/04/2017, 10/10/2018    Influenza, Triv, inactivated, subunit, adjuvanted, IM (Fluad 65 yrs and older) 2019    Pneumococcal, PCV-13, PREVNAR 15, (age 6w+), IM, 0.5mL 2015    Pneumococcal, PPSV23, PNEUMOVAX 23, (age 2y+), SC/IM, 0.5mL 2007       Active Problems:  Patient Active Problem List   Diagnosis Code    Benign prostatic hyperplasia without urinary obstruction N40.0    Mixed hyperlipidemia E78.2    Bradycardia R00.1    Hypertension, essential I10    Pre-diabetes R73.03    Primary osteoarthritis of right knee M17.11    Memory impairment R41.3    Nipple soreness

## 2023-07-31 RX ORDER — DONEPEZIL HYDROCHLORIDE 10 MG/1
TABLET, FILM COATED ORAL
Qty: 90 TABLET | OUTPATIENT
Start: 2023-07-31

## 2023-11-22 NOTE — GROUP NOTE
Group Therapy Note    Date: 2/6/2023    Group Start Time: 1300  Group End Time: 6772  Group Topic: Activity    [address]        Group Therapy Note    Attendees: 2    Facilitator led a group game of heads-up. Patients were given a picture to look at and were prompted to give clues for the facilitator to guess what the picture was. Patients had to use memory recall and cognitive reasoning to produce clues/hints. Patient's Goal:  Patient will be able to participate in game and be able to work with team members to provide clues/hints to help the facilitator guess the right answer. Notes:  Patient was able to participate in the game, worked well with team member, and produced clues/hints that were logical and reasonable. Status After Intervention:  Improved    Participation Level:  Active Listener and Interactive    Participation Quality: Appropriate      Speech:  normal      Thought Process/Content: Logical      Affective Functioning: Congruent      Mood: euthymic      Level of consciousness:  Alert, Oriented x4, and Attentive      Response to Learning: Able to verbalize current knowledge/experience, Able to verbalize/acknowledge new learning, Able to change behavior, and Progressing to goal      Endings: None Reported    Modes of Intervention: Socialization and Activity      Discipline Responsible: /Counselor      Signature:  LELAND Rock 22-Nov-2023 22:57